# Patient Record
Sex: MALE | Race: BLACK OR AFRICAN AMERICAN | NOT HISPANIC OR LATINO | URBAN - METROPOLITAN AREA
[De-identification: names, ages, dates, MRNs, and addresses within clinical notes are randomized per-mention and may not be internally consistent; named-entity substitution may affect disease eponyms.]

---

## 2018-09-06 ENCOUNTER — INPATIENT (INPATIENT)
Facility: HOSPITAL | Age: 83
LOS: 4 days | Discharge: NURSING, REHAB OR LONG-TERM CARE | End: 2018-09-11
Attending: HOSPITALIST | Admitting: HOSPITALIST

## 2018-09-06 VITALS
RESPIRATION RATE: 198 BRPM | OXYGEN SATURATION: 100 % | HEIGHT: 69 IN | WEIGHT: 225.09 LBS | HEART RATE: 55 BPM | SYSTOLIC BLOOD PRESSURE: 122 MMHG | DIASTOLIC BLOOD PRESSURE: 69 MMHG | TEMPERATURE: 96 F

## 2018-09-06 DIAGNOSIS — Z98.890 OTHER SPECIFIED POSTPROCEDURAL STATES: Chronic | ICD-10-CM

## 2018-09-06 LAB
ALBUMIN SERPL ELPH-MCNC: 4.2 G/DL — SIGNIFICANT CHANGE UP (ref 3.5–5.2)
ALP SERPL-CCNC: 189 U/L — HIGH (ref 30–115)
ALT FLD-CCNC: 10 U/L — SIGNIFICANT CHANGE UP (ref 0–41)
ANION GAP SERPL CALC-SCNC: 17 MMOL/L — HIGH (ref 7–14)
APPEARANCE UR: CLEAR — SIGNIFICANT CHANGE UP
AST SERPL-CCNC: 42 U/L — HIGH (ref 0–41)
BACTERIA # UR AUTO: ABNORMAL
BASE EXCESS BLDV CALC-SCNC: 4 MMOL/L — HIGH (ref -2–2)
BASOPHILS # BLD AUTO: 0.02 K/UL — SIGNIFICANT CHANGE UP (ref 0–0.2)
BASOPHILS NFR BLD AUTO: 0.2 % — SIGNIFICANT CHANGE UP (ref 0–1)
BILIRUB SERPL-MCNC: 0.5 MG/DL — SIGNIFICANT CHANGE UP (ref 0.2–1.2)
BILIRUB UR-MCNC: NEGATIVE — SIGNIFICANT CHANGE UP
BUN SERPL-MCNC: 24 MG/DL — HIGH (ref 10–20)
CA-I SERPL-SCNC: 1.25 MMOL/L — SIGNIFICANT CHANGE UP (ref 1.12–1.3)
CALCIUM SERPL-MCNC: 9.4 MG/DL — SIGNIFICANT CHANGE UP (ref 8.5–10.1)
CHLORIDE SERPL-SCNC: 102 MMOL/L — SIGNIFICANT CHANGE UP (ref 98–110)
CO2 SERPL-SCNC: 29 MMOL/L — SIGNIFICANT CHANGE UP (ref 17–32)
COLOR SPEC: YELLOW — SIGNIFICANT CHANGE UP
COMMENT - URINE: SIGNIFICANT CHANGE UP
CREAT SERPL-MCNC: 1.9 MG/DL — HIGH (ref 0.7–1.5)
DIFF PNL FLD: NEGATIVE — SIGNIFICANT CHANGE UP
EOSINOPHIL # BLD AUTO: 0.07 K/UL — SIGNIFICANT CHANGE UP (ref 0–0.7)
EOSINOPHIL NFR BLD AUTO: 0.9 % — SIGNIFICANT CHANGE UP (ref 0–8)
EPI CELLS # UR: ABNORMAL /HPF
GAS PNL BLDV: 146 MMOL/L — HIGH (ref 136–145)
GAS PNL BLDV: SIGNIFICANT CHANGE UP
GLUCOSE SERPL-MCNC: 125 MG/DL — HIGH (ref 70–99)
GLUCOSE UR QL: NEGATIVE MG/DL — SIGNIFICANT CHANGE UP
GRAN CASTS # UR COMP ASSIST: ABNORMAL /LPF
HCO3 BLDV-SCNC: 31 MMOL/L — HIGH (ref 22–29)
HCT VFR BLD CALC: 33.7 % — LOW (ref 42–52)
HCT VFR BLDA CALC: 38.4 % — SIGNIFICANT CHANGE UP (ref 34–44)
HGB BLD CALC-MCNC: 12.5 G/DL — LOW (ref 14–18)
HGB BLD-MCNC: 11.1 G/DL — LOW (ref 14–18)
HOROWITZ INDEX BLDV+IHG-RTO: 21 — SIGNIFICANT CHANGE UP
HYALINE CASTS # UR AUTO: ABNORMAL /LPF
IMM GRANULOCYTES NFR BLD AUTO: 0.2 % — SIGNIFICANT CHANGE UP (ref 0.1–0.3)
KETONES UR-MCNC: NEGATIVE — SIGNIFICANT CHANGE UP
LACTATE BLDV-MCNC: 1.6 MMOL/L — SIGNIFICANT CHANGE UP (ref 0.5–1.6)
LACTATE SERPL-SCNC: 1.9 MMOL/L — SIGNIFICANT CHANGE UP (ref 0.5–2.2)
LEUKOCYTE ESTERASE UR-ACNC: NEGATIVE — SIGNIFICANT CHANGE UP
LYMPHOCYTES # BLD AUTO: 1.64 K/UL — SIGNIFICANT CHANGE UP (ref 1.2–3.4)
LYMPHOCYTES # BLD AUTO: 20.4 % — LOW (ref 20.5–51.1)
MAGNESIUM SERPL-MCNC: 2.6 MG/DL — HIGH (ref 1.8–2.4)
MCHC RBC-ENTMCNC: 30.4 PG — SIGNIFICANT CHANGE UP (ref 27–31)
MCHC RBC-ENTMCNC: 32.9 G/DL — SIGNIFICANT CHANGE UP (ref 32–37)
MCV RBC AUTO: 92.3 FL — SIGNIFICANT CHANGE UP (ref 80–94)
MONOCYTES # BLD AUTO: 0.76 K/UL — HIGH (ref 0.1–0.6)
MONOCYTES NFR BLD AUTO: 9.5 % — HIGH (ref 1.7–9.3)
NEUTROPHILS # BLD AUTO: 5.51 K/UL — SIGNIFICANT CHANGE UP (ref 1.4–6.5)
NEUTROPHILS NFR BLD AUTO: 68.8 % — SIGNIFICANT CHANGE UP (ref 42.2–75.2)
NITRITE UR-MCNC: NEGATIVE — SIGNIFICANT CHANGE UP
NRBC # BLD: 0 /100 WBCS — SIGNIFICANT CHANGE UP (ref 0–0)
PCO2 BLDV: 54 MMHG — HIGH (ref 41–51)
PH BLDV: 7.36 — SIGNIFICANT CHANGE UP (ref 7.26–7.43)
PH UR: 5.5 — SIGNIFICANT CHANGE UP (ref 5–8)
PLATELET # BLD AUTO: 261 K/UL — SIGNIFICANT CHANGE UP (ref 130–400)
PO2 BLDV: 27 MMHG — SIGNIFICANT CHANGE UP (ref 20–40)
POTASSIUM BLDV-SCNC: 3.7 MMOL/L — SIGNIFICANT CHANGE UP (ref 3.3–5.6)
POTASSIUM SERPL-MCNC: 6.4 MMOL/L — CRITICAL HIGH (ref 3.5–5)
POTASSIUM SERPL-SCNC: 6.4 MMOL/L — CRITICAL HIGH (ref 3.5–5)
PROT SERPL-MCNC: 7.8 G/DL — SIGNIFICANT CHANGE UP (ref 6–8)
PROT UR-MCNC: 100 MG/DL
RBC # BLD: 3.65 M/UL — LOW (ref 4.7–6.1)
RBC # FLD: 11.9 % — SIGNIFICANT CHANGE UP (ref 11.5–14.5)
RBC CASTS # UR COMP ASSIST: SIGNIFICANT CHANGE UP /HPF
SAO2 % BLDV: 42 % — SIGNIFICANT CHANGE UP
SODIUM SERPL-SCNC: 148 MMOL/L — HIGH (ref 135–146)
SP GR SPEC: 1.02 — SIGNIFICANT CHANGE UP (ref 1.01–1.03)
TROPONIN T SERPL-MCNC: <0.01 NG/ML — SIGNIFICANT CHANGE UP
UROBILINOGEN FLD QL: 0.2 MG/DL — SIGNIFICANT CHANGE UP (ref 0.2–0.2)
WBC # BLD: 8.02 K/UL — SIGNIFICANT CHANGE UP (ref 4.8–10.8)
WBC # FLD AUTO: 8.02 K/UL — SIGNIFICANT CHANGE UP (ref 4.8–10.8)
WBC UR QL: SIGNIFICANT CHANGE UP /HPF

## 2018-09-06 NOTE — ED ADULT TRIAGE NOTE - CHIEF COMPLAINT QUOTE
bambi from [facility] [place] for lethargy and excessive drooling.  fs [phone] in the field

## 2018-09-06 NOTE — ED ADULT NURSE NOTE - CHIEF COMPLAINT QUOTE
[place] [facility] [place].  fs [phone] in the field

## 2018-09-06 NOTE — ED ADULT NURSE NOTE - PMH
DM (diabetes mellitus)    HTN (hypertension) DM (diabetes mellitus)    HTN (hypertension)    Kidney disease Dementia    DM (diabetes mellitus)    HTN (hypertension)    Kidney disease

## 2018-09-06 NOTE — ED ADULT NURSE NOTE - NSIMPLEMENTINTERV_GEN_ALL_ED
Implemented All Fall with Harm Risk Interventions:  [place] to call system. Call bell, personal items and telephone within reach. Instruct patient to call for assistance. Room bathroom lighting operational. Non-slip footwear when patient is off stretcher. Physically safe environment: no spills, clutter or unnecessary equipment. Stretcher in lowest position, wheels locked, appropriate side rails in place. Provide visual cue, wrist band, yellow gown, etc. Monitor gait and stability. Monitor for mental status changes and reorient to person, place, and time. Review medications for side effects contributing to fall risk. Reinforce activity limits and safety measures with patient and family. Provide visual clues: red socks.

## 2018-09-06 NOTE — ED ADULT NURSE REASSESSMENT NOTE - NS ED NURSE REASSESS COMMENT FT1
IV infiltrated, PA Demarco aware, pt is with difficult IV access, PA used Ultrasound to insert IV line.

## 2018-09-07 DIAGNOSIS — R41.82 ALTERED MENTAL STATUS, UNSPECIFIED: ICD-10-CM

## 2018-09-07 DIAGNOSIS — I10 ESSENTIAL (PRIMARY) HYPERTENSION: ICD-10-CM

## 2018-09-07 DIAGNOSIS — F03.90 UNSPECIFIED DEMENTIA WITHOUT BEHAVIORAL DISTURBANCE: ICD-10-CM

## 2018-09-07 LAB
ANION GAP SERPL CALC-SCNC: 12 MMOL/L — SIGNIFICANT CHANGE UP (ref 7–14)
BUN SERPL-MCNC: 21 MG/DL — HIGH (ref 10–20)
CALCIUM SERPL-MCNC: 9.4 MG/DL — SIGNIFICANT CHANGE UP (ref 8.5–10.1)
CHLORIDE SERPL-SCNC: 105 MMOL/L — SIGNIFICANT CHANGE UP (ref 98–110)
CO2 SERPL-SCNC: 29 MMOL/L — SIGNIFICANT CHANGE UP (ref 17–32)
CREAT SERPL-MCNC: 1.9 MG/DL — HIGH (ref 0.7–1.5)
GLUCOSE BLDC GLUCOMTR-MCNC: 105 MG/DL — HIGH (ref 70–99)
GLUCOSE BLDC GLUCOMTR-MCNC: 141 MG/DL — HIGH (ref 70–99)
GLUCOSE BLDC GLUCOMTR-MCNC: 151 MG/DL — HIGH (ref 70–99)
GLUCOSE BLDC GLUCOMTR-MCNC: 98 MG/DL — SIGNIFICANT CHANGE UP (ref 70–99)
GLUCOSE SERPL-MCNC: 117 MG/DL — HIGH (ref 70–99)
HCT VFR BLD CALC: 34.6 % — LOW (ref 42–52)
HGB BLD-MCNC: 10.9 G/DL — LOW (ref 14–18)
MCHC RBC-ENTMCNC: 29 PG — SIGNIFICANT CHANGE UP (ref 27–31)
MCHC RBC-ENTMCNC: 31.5 G/DL — LOW (ref 32–37)
MCV RBC AUTO: 92 FL — SIGNIFICANT CHANGE UP (ref 80–94)
NRBC # BLD: 0 /100 WBCS — SIGNIFICANT CHANGE UP (ref 0–0)
PLATELET # BLD AUTO: 101 K/UL — LOW (ref 130–400)
POTASSIUM SERPL-MCNC: 4 MMOL/L — SIGNIFICANT CHANGE UP (ref 3.5–5)
POTASSIUM SERPL-SCNC: 4 MMOL/L — SIGNIFICANT CHANGE UP (ref 3.5–5)
RBC # BLD: 3.76 M/UL — LOW (ref 4.7–6.1)
RBC # FLD: 11.9 % — SIGNIFICANT CHANGE UP (ref 11.5–14.5)
SODIUM SERPL-SCNC: 146 MMOL/L — SIGNIFICANT CHANGE UP (ref 135–146)
WBC # BLD: 7.45 K/UL — SIGNIFICANT CHANGE UP (ref 4.8–10.8)
WBC # FLD AUTO: 7.45 K/UL — SIGNIFICANT CHANGE UP (ref 4.8–10.8)

## 2018-09-07 RX ORDER — FUROSEMIDE 40 MG
40 TABLET ORAL DAILY
Qty: 0 | Refills: 0 | Status: DISCONTINUED | OUTPATIENT
Start: 2018-09-07 | End: 2018-09-07

## 2018-09-07 RX ORDER — ASPIRIN/CALCIUM CARB/MAGNESIUM 324 MG
81 TABLET ORAL DAILY
Qty: 0 | Refills: 0 | Status: DISCONTINUED | OUTPATIENT
Start: 2018-09-07 | End: 2018-09-11

## 2018-09-07 RX ORDER — FERROUS SULFATE 325(65) MG
0 TABLET ORAL
Qty: 0 | Refills: 0 | COMMUNITY

## 2018-09-07 RX ORDER — TAMSULOSIN HYDROCHLORIDE 0.4 MG/1
0.4 CAPSULE ORAL AT BEDTIME
Qty: 0 | Refills: 0 | Status: DISCONTINUED | OUTPATIENT
Start: 2018-09-07 | End: 2018-09-11

## 2018-09-07 RX ORDER — SODIUM CHLORIDE 9 MG/ML
1000 INJECTION INTRAMUSCULAR; INTRAVENOUS; SUBCUTANEOUS
Qty: 0 | Refills: 0 | Status: DISCONTINUED | OUTPATIENT
Start: 2018-09-07 | End: 2018-09-07

## 2018-09-07 RX ORDER — INFLUENZA VIRUS VACCINE 15; 15; 15; 15 UG/.5ML; UG/.5ML; UG/.5ML; UG/.5ML
0.5 SUSPENSION INTRAMUSCULAR ONCE
Qty: 0 | Refills: 0 | Status: DISCONTINUED | OUTPATIENT
Start: 2018-09-07 | End: 2018-09-07

## 2018-09-07 RX ORDER — SODIUM CHLORIDE 9 MG/ML
1000 INJECTION INTRAMUSCULAR; INTRAVENOUS; SUBCUTANEOUS
Qty: 0 | Refills: 0 | Status: DISCONTINUED | OUTPATIENT
Start: 2018-09-07 | End: 2018-09-08

## 2018-09-07 RX ORDER — AMLODIPINE BESYLATE 2.5 MG/1
5 TABLET ORAL DAILY
Qty: 0 | Refills: 0 | Status: DISCONTINUED | OUTPATIENT
Start: 2018-09-07 | End: 2018-09-11

## 2018-09-07 RX ORDER — DONEPEZIL HYDROCHLORIDE 10 MG/1
1 TABLET, FILM COATED ORAL
Qty: 0 | Refills: 0 | COMMUNITY

## 2018-09-07 RX ORDER — MEMANTINE HYDROCHLORIDE 10 MG/1
10 TABLET ORAL
Qty: 0 | Refills: 0 | Status: DISCONTINUED | OUTPATIENT
Start: 2018-09-07 | End: 2018-09-11

## 2018-09-07 RX ORDER — LOSARTAN POTASSIUM 100 MG/1
50 TABLET, FILM COATED ORAL DAILY
Qty: 0 | Refills: 0 | Status: DISCONTINUED | OUTPATIENT
Start: 2018-09-07 | End: 2018-09-07

## 2018-09-07 RX ORDER — DONEPEZIL HYDROCHLORIDE 10 MG/1
10 TABLET, FILM COATED ORAL AT BEDTIME
Qty: 0 | Refills: 0 | Status: DISCONTINUED | OUTPATIENT
Start: 2018-09-07 | End: 2018-09-11

## 2018-09-07 RX ORDER — TAMSULOSIN HYDROCHLORIDE 0.4 MG/1
1 CAPSULE ORAL
Qty: 0 | Refills: 0 | COMMUNITY

## 2018-09-07 RX ORDER — MEMANTINE HYDROCHLORIDE 10 MG/1
1 TABLET ORAL
Qty: 0 | Refills: 0 | COMMUNITY

## 2018-09-07 RX ORDER — FERROUS SULFATE 325(65) MG
325 TABLET ORAL DAILY
Qty: 0 | Refills: 0 | Status: DISCONTINUED | OUTPATIENT
Start: 2018-09-07 | End: 2018-09-11

## 2018-09-07 RX ORDER — ASPIRIN/CALCIUM CARB/MAGNESIUM 324 MG
1 TABLET ORAL
Qty: 0 | Refills: 0 | COMMUNITY

## 2018-09-07 RX ADMIN — MEMANTINE HYDROCHLORIDE 10 MILLIGRAM(S): 10 TABLET ORAL at 18:00

## 2018-09-07 RX ADMIN — SODIUM CHLORIDE 75 MILLILITER(S): 9 INJECTION INTRAMUSCULAR; INTRAVENOUS; SUBCUTANEOUS at 17:25

## 2018-09-07 RX ADMIN — Medication 325 MILLIGRAM(S): at 11:08

## 2018-09-07 RX ADMIN — LOSARTAN POTASSIUM 50 MILLIGRAM(S): 100 TABLET, FILM COATED ORAL at 06:22

## 2018-09-07 RX ADMIN — Medication 81 MILLIGRAM(S): at 11:07

## 2018-09-07 RX ADMIN — Medication 40 MILLIGRAM(S): at 06:23

## 2018-09-07 RX ADMIN — MEMANTINE HYDROCHLORIDE 10 MILLIGRAM(S): 10 TABLET ORAL at 06:22

## 2018-09-07 RX ADMIN — DONEPEZIL HYDROCHLORIDE 10 MILLIGRAM(S): 10 TABLET, FILM COATED ORAL at 22:43

## 2018-09-07 NOTE — ED PROVIDER NOTE - OBJECTIVE STATEMENT
84 yo M with PMHx of dementia, DM, and HTN presents to the ED BIB family for evaluation of altered mental status. According to family pt is not as responsive as usual and has had increased drooling from his mouth. Usually pt is able to communicate well and walk to bathroom alone but since this morning they have been unable to communicate with him and he has not been having trouble walking. Family took pt to [facility] [place] who advised he go to ED for evaluation. Pt unable to provide hx 2/2 dementia/change in mental status?

## 2018-09-07 NOTE — ED PROVIDER NOTE - ATTENDING CONTRIBUTION TO CARE
84 yo M PMHx noted [place] via [place] [facility] [place]/o increased sleepiness, weakness over the last few days.  Granddaughter states that this has happened to him once in the past.  PMD advised to d/c one of his medications yesterday.  On exam pt in NAD sleepy, follows commands, OP clear, + drooling, Lungs CAT B/L no wrr, abd soft nt nd, decreased strength LE b/l, good

## 2018-09-07 NOTE — SWALLOW BEDSIDE ASSESSMENT ADULT - SWALLOW EVAL: RECOMMENDED FEEDING/EATING TECHNIQUES
position upright (90 degrees)/allow for swallow between intakes/maintain upright posture during/after eating for 30 mins/oral hygiene/alternate food with liquid

## 2018-09-07 NOTE — SWALLOW BEDSIDE ASSESSMENT ADULT - SWALLOW EVAL: DIAGNOSIS
cognitive skills impact PO intake per family patient is unable to tolerate chewables and tolerated puree at home.

## 2018-09-07 NOTE — ED PROVIDER NOTE - PHYSICAL EXAMINATION
VITAL SIGNS: I have reviewed nursing notes and confirm.  CONSTITUTIONAL: Elderly male laying on stretcher in NAD.   SKIN: Skin exam is warm and dry, no acute rash.  HEAD: Normocephalic; atraumatic.  EYES: Conjunctiva and sclera clear.  ENT: No nasal discharge; airway clear.   NECK: Supple; non tender.  CARD: S1, S2 normal; no murmurs, gallops, or rubs. Regular rate and rhythm.  RESP: No wheezes, rales or rhonchi. Speaking in full sentences.   ABD: Normal bowel sounds; soft; non-distended; non-tender; No rebound or guarding.   EXT: No clubbing, cyanosis or edema.  NEURO: Alert, oriented to self. Follows simple commands. Strength 5/5 B/L UE. Strength 4/5 B/L LE. Unable to walk. Sensation intact.

## 2018-09-07 NOTE — H&P ADULT - NSHPLABSRESULTS_GEN_ALL_CORE
11.1   8.02  )-----------( 261      ( 06 Sep 2018 22:40 )             33.7         146  |  105  |  21<H>  ----------------------------<  117<H>  4.0   |  29  |  1.9<H>    Ca    9.4      07 Sep 2018 00:45  Mg     2.6         TPro  7.8  /  Alb  4.2  /  TBili  0.5  /  DBili  x   /  AST  42<H>  /  ALT  10  /  AlkPhos  189<H>            Urinalysis Basic - ( 06 Sep 2018 22:39 )    Color: Yellow / Appearance: Clear / S.020 / pH: x  Gluc: x / Ketone: Negative  / Bili: Negative / Urobili: 0.2 mg/dL   Blood: x / Protein: 100 mg/dL / Nitrite: Negative   Leuk Esterase: Negative / RBC: 1-2 /HPF / WBC 1-2 /HPF   Sq Epi: x / Non Sq Epi: Few /HPF / Bacteria: Moderate        Lactate Trend   @ 18:03 Lactate:1.9     CARDIAC MARKERS ( 06 Sep 2018 18:03 )  x     / <0.01 ng/mL / x     / x     / x          CAPILLARY BLOOD GLUCOSE      POCT Blood Glucose.: 143 mg/dL (06 Sep 2018 17:50)

## 2018-09-07 NOTE — CONSULT NOTE ADULT - SUBJECTIVE AND OBJECTIVE BOX
Neurology Consultation note    Name   84 yo M with PMHx of dementia, DM, and HTN presents to the ED BIB family for evaluation of altered mental status. According to family pt is not as responsive as usual and has had increased drooling from his mouth. Usually pt is able to communicate well and walk to bathroom alone but since this morning they have been unable to communicate with him and he has not been having trouble walking.   NO LOC. PATIENT SEEMED TO BE STARING AT HIS WIFE BUT NOT VERBALIZING.    NOT RESPONSIVE AS USUAL. NO CONVULSIVE ACTIVITY.  THIS HAS BEEN GOING ON SINCE THE WEEKEND AS PER DAUGHTER  NO FEVER OR OTHER CONSTITUTIONAL SIGNS/SYMPTOMS    PATIENT HAD RECENT VISIT TO CARDIO AT [facility]. WAS TOLD HE HAD A COMPROMISED VALVE BUT FAMILY WAS NOT GIVEN AN Y MORE HX    SOCIAL HX: PATIENT WAS A FORMER BOXER  FAMILY HX: NON CONTRIBUTORY        Vital Signs Last 24 Hrs  T(C): 35.6 (07 Sep 2018 06:22), Max: 35.6 (07 Sep 2018 01:06)  T(F): 96 (07 Sep 2018 06:22), Max: 96 (07 Sep 2018 01:06)  HR: 52 (07 Sep 2018 10:16) (52 - 74)  BP: 121/66 (07 Sep 2018 10:16) (120/66 - 187/79)  BP(mean): --  RR: 16 (07 Sep 2018 06:22) (16 - 198)  SpO2: 100% (07 Sep 2018 01:06) (100% - 100%)    Neurological Exam:   LETHARGIC, ABLE TO OPEN EYES, FOLLOWS ALL SIMPLE COMMANDS  DECREASE SPEECH OUTPUT  MOVES ALL 4 EXTREMITIES EQUALLY  NON GROSS FOCALITY ON CR NN EXAM.  BUT PATIENT BASELINE BLIND OD. OD EXODEVIATED AT BASELINE  DEPRESSED DTRS X 4    Medications  amLODIPine   Tablet 5 milliGRAM(s) Oral daily  aspirin enteric coated 81 milliGRAM(s) Oral daily  donepezil 10 milliGRAM(s) Oral at bedtime  ferrous    sulfate 325 milliGRAM(s) Oral daily  memantine 10 milliGRAM(s) Oral two times a day  sodium chloride 0.9%. 1000 milliLiter(s) IV Continuous <Continuous>  tamsulosin 0.4 milliGRAM(s) Oral at bedtime      Lab  09-07    146  |  105  |  21<H>  ----------------------------<  117<H>  4.0   |  29  |  1.9<H>    Ca    9.4      07 Sep 2018 00:45  Mg     2.6     09-06    TPro  7.8  /  Alb  4.2  /  TBili  0.5  /  DBili  x   /  AST  42<H>  /  ALT  10  /  AlkPhos  189<H>  09-06                          10.9   7.45  )-----------( 101      ( 07 Sep 2018 08:42 )             34.6     LIVER FUNCTIONS - ( 06 Sep 2018 18:03 )  Alb: 4.2 g/dL / Pro: 7.8 g/dL / ALK PHOS: 189 U/L / ALT: 10 U/L / AST: 42 U/L / GGT: x             2.6        Radiology  CT Head No Cont:   EXAM:  CT BRAIN            PROCEDURE DATE:  09/06/2018            INTERPRETATION:  Clinical History / Reason for exam: Altered mental   status.    Technique: Multiple contiguous axial CT images of the head were obtained    from the base of the skull to the vertex without administration of   intravenous contrast. Coronal and sagittal reformats were obtained.    Comparison: None available.    Findings:     The ventricles, basal cisterns and sulcal pattern are prominent   consistent with parenchymal volume loss.    There are patchy and confluent areas of hypoattenuation in the   periventricular and subcortical white matter compatible with chronic   microvascular ischemic changes.    Grey-white differentiation is preserved.    There is no acutemass effect, midline shift or intracranial hemorrhage.      The calvarium is intact.    There is near complete opacification of the bilateral mastoid sinuses.   The paranasal sinuses are unremarkable.    Impression:     No CT evidence for acute intracranial pathology.    Moderate chronic microvascular ischemic changes.    Near complete opacification of the bilateral mastoid sinuses. Rule out   mastoiditis.        Assessment:  84 YO MAN WITH AFOREMENTIONED HX P/W AMS SINCE THE WEEKEND. WIFE REPORTS STARING SPELLS BUT NO LOC  PATIENT WITH LOWER SZ THRESHOLD GIVEN HX OF DEMENTIA  MAY ALSO BE PROGRESSION OF DEMENTIA BUT LESS LIKELY GIVEN ACUITY    Plan:  WILL START WITH REEG  NO AEDS FOR NOW  CONT W/U FOR MEDICAL CAUSES OF DELIRIUM/AMS  CHECK B12, FOLATE, THYROID PANEL AND AMMONIA   CONT ARICEPT AND NAMENDA  PLEASE CALL WITH ANY QUESTIONS

## 2018-09-07 NOTE — SWALLOW BEDSIDE ASSESSMENT ADULT - ASR SWALLOW ASPIRATION MONITOR
pneumonia/gurgly voice/upper respiratory infection/change of breathing pattern/cough/oral hygiene/position upright (90Y)/fever/throat clearing

## 2018-09-07 NOTE — H&P ADULT - ASSESSMENT
Patient is a 83y old  Male who presents with a chief complaint of                                                                                                                                                                                                                                                                                      HEALTH ISSUES - PROBLEM Dx:altered mental statushcvd/dementiadm

## 2018-09-07 NOTE — H&P ADULT - NSHPREVIEWOFSYSTEMS_GEN_ALL_CORE
REVIEW OF SYSTEMS:      CONSTITUTIONAL:     fever. significant wt loss  --n/a  EYES/ENT:       NECK:       RESPIRATORY:        copd n/a  CARDIOVASCULAR:        htn ++  GASTROINTESTINAL:     Dm   ++  GENITOURINARY:        NEUROLOGICAL:         Dementia ++  SKIN:

## 2018-09-08 DIAGNOSIS — N17.9 ACUTE KIDNEY FAILURE, UNSPECIFIED: ICD-10-CM

## 2018-09-08 DIAGNOSIS — E11.9 TYPE 2 DIABETES MELLITUS WITHOUT COMPLICATIONS: ICD-10-CM

## 2018-09-08 DIAGNOSIS — D69.6 THROMBOCYTOPENIA, UNSPECIFIED: ICD-10-CM

## 2018-09-08 LAB
ALBUMIN SERPL ELPH-MCNC: 3.3 G/DL — LOW (ref 3.5–5.2)
ALP SERPL-CCNC: 167 U/L — HIGH (ref 30–115)
ALT FLD-CCNC: 6 U/L — SIGNIFICANT CHANGE UP (ref 0–41)
AMMONIA BLD-MCNC: 19 UMOL/L — SIGNIFICANT CHANGE UP (ref 11–55)
ANION GAP SERPL CALC-SCNC: 9 MMOL/L — SIGNIFICANT CHANGE UP (ref 7–14)
AST SERPL-CCNC: 17 U/L — SIGNIFICANT CHANGE UP (ref 0–41)
BILIRUB SERPL-MCNC: 0.4 MG/DL — SIGNIFICANT CHANGE UP (ref 0.2–1.2)
BUN SERPL-MCNC: 15 MG/DL — SIGNIFICANT CHANGE UP (ref 10–20)
CALCIUM SERPL-MCNC: 8.9 MG/DL — SIGNIFICANT CHANGE UP (ref 8.5–10.1)
CHLORIDE SERPL-SCNC: 107 MMOL/L — SIGNIFICANT CHANGE UP (ref 98–110)
CO2 SERPL-SCNC: 29 MMOL/L — SIGNIFICANT CHANGE UP (ref 17–32)
CREAT SERPL-MCNC: 1.2 MG/DL — SIGNIFICANT CHANGE UP (ref 0.7–1.5)
CULTURE RESULTS: SIGNIFICANT CHANGE UP
GLUCOSE BLDC GLUCOMTR-MCNC: 101 MG/DL — HIGH (ref 70–99)
GLUCOSE BLDC GLUCOMTR-MCNC: 121 MG/DL — HIGH (ref 70–99)
GLUCOSE BLDC GLUCOMTR-MCNC: 128 MG/DL — HIGH (ref 70–99)
GLUCOSE BLDC GLUCOMTR-MCNC: 161 MG/DL — HIGH (ref 70–99)
GLUCOSE SERPL-MCNC: 138 MG/DL — HIGH (ref 70–99)
POTASSIUM SERPL-MCNC: 4.1 MMOL/L — SIGNIFICANT CHANGE UP (ref 3.5–5)
POTASSIUM SERPL-SCNC: 4.1 MMOL/L — SIGNIFICANT CHANGE UP (ref 3.5–5)
PROT SERPL-MCNC: 6.1 G/DL — SIGNIFICANT CHANGE UP (ref 6–8)
SODIUM SERPL-SCNC: 145 MMOL/L — SIGNIFICANT CHANGE UP (ref 135–146)
SPECIMEN SOURCE: SIGNIFICANT CHANGE UP

## 2018-09-08 RX ORDER — TEMAZEPAM 15 MG/1
15 CAPSULE ORAL AT BEDTIME
Qty: 0 | Refills: 0 | Status: DISCONTINUED | OUTPATIENT
Start: 2018-09-08 | End: 2018-09-11

## 2018-09-08 RX ORDER — TEMAZEPAM 15 MG/1
15 CAPSULE ORAL ONCE
Qty: 0 | Refills: 0 | Status: DISCONTINUED | OUTPATIENT
Start: 2018-09-08 | End: 2018-09-08

## 2018-09-08 RX ORDER — SODIUM CHLORIDE 9 MG/ML
1000 INJECTION INTRAMUSCULAR; INTRAVENOUS; SUBCUTANEOUS
Qty: 0 | Refills: 0 | Status: DISCONTINUED | OUTPATIENT
Start: 2018-09-08 | End: 2018-09-09

## 2018-09-08 RX ADMIN — DONEPEZIL HYDROCHLORIDE 10 MILLIGRAM(S): 10 TABLET, FILM COATED ORAL at 21:43

## 2018-09-08 RX ADMIN — MEMANTINE HYDROCHLORIDE 10 MILLIGRAM(S): 10 TABLET ORAL at 17:33

## 2018-09-08 RX ADMIN — Medication 81 MILLIGRAM(S): at 11:20

## 2018-09-08 RX ADMIN — SODIUM CHLORIDE 60 MILLILITER(S): 9 INJECTION INTRAMUSCULAR; INTRAVENOUS; SUBCUTANEOUS at 17:32

## 2018-09-08 RX ADMIN — TEMAZEPAM 15 MILLIGRAM(S): 15 CAPSULE ORAL at 23:07

## 2018-09-08 RX ADMIN — SODIUM CHLORIDE 75 MILLILITER(S): 9 INJECTION INTRAMUSCULAR; INTRAVENOUS; SUBCUTANEOUS at 09:58

## 2018-09-08 RX ADMIN — MEMANTINE HYDROCHLORIDE 10 MILLIGRAM(S): 10 TABLET ORAL at 05:32

## 2018-09-08 RX ADMIN — AMLODIPINE BESYLATE 5 MILLIGRAM(S): 2.5 TABLET ORAL at 05:32

## 2018-09-08 RX ADMIN — Medication 325 MILLIGRAM(S): at 11:20

## 2018-09-08 RX ADMIN — TAMSULOSIN HYDROCHLORIDE 0.4 MILLIGRAM(S): 0.4 CAPSULE ORAL at 21:43

## 2018-09-08 NOTE — PROGRESS NOTE ADULT - PROBLEM SELECTOR PLAN 1
CT head negative   No evidence of infectious encephalopathy  EEG normal   Suspect this due to worsening dementia   f/u b12, folate, syphilis screen, TSH

## 2018-09-08 NOTE — PROGRESS NOTE ADULT - ASSESSMENT
84 yo M with PMHx of dementia, DM, and HTN presents to the ED BIB family for evaluation of altered mental status. According to family pt is not as responsive as usual and has had increased drooling from his mouth. Usually pt is able to communicate well and walk to bathroom alone but since the morning of admission they have been unable to communicate with him and he has not been having trouble walking. [place] [facility] [place] go to ED for evaluation. Pt unable to provide hx 2/2 dementia.     Today, patient is alert and oriented x1. He is communicating in complete sentences.

## 2018-09-08 NOTE — PROGRESS NOTE ADULT - SUBJECTIVE AND OBJECTIVE BOX
Pt seen and examined at bedside. Calm, pleasant, AOx1. Denies any complaints.     VITAL SIGNS (Last 24 hrs):  T(C): 35.6 (09-08-18 @ 13:52), Max: 35.8 (09-07-18 @ 21:43)  HR: 55 (09-08-18 @ 13:52) (50 - 56)  BP: 137/63 (09-08-18 @ 13:52) (126/61 - 137/63)  RR: 16 (09-08-18 @ 13:52) (16 - 18)  SpO2: --  Wt(kg): --  Daily     Daily     I&O's Summary    07 Sep 2018 07:01  -  08 Sep 2018 07:00  --------------------------------------------------------  IN: 1000 mL / OUT: 0 mL / NET: 1000 mL    08 Sep 2018 07:01  -  08 Sep 2018 16:01  --------------------------------------------------------  IN: 1000 mL / OUT: 0 mL / NET: 1000 mL      PHYSICAL EXAM:  GENERAL: NAD, elderly   HEAD:  Atraumatic, Normocephalic  EYES: EOMI, PERRLA, conjunctiva and sclera clear  NECK: Supple, No JVD  CHEST/LUNG: Clear to auscultation bilaterally; No wheeze  HEART: Regular rate and rhythm; No murmurs, rubs, or gallops  ABDOMEN: Soft, Nontender, Nondistended; Bowel sounds present  EXTREMITIES:  2+ Peripheral Pulses, No clubbing, cyanosis, or edema  PSYCH: AAOx1  NEUROLOGY: uncooperative   SKIN: No rashes or lesions    Labs Reviewed  Spoke to patient in regards to abnormal labs.    CBC Full  -  ( 07 Sep 2018 08:42 )  WBC Count : 7.45 K/uL  Hemoglobin : 10.9 g/dL  Hematocrit : 34.6 %  Platelet Count - Automated : 101 K/uL  Mean Cell Volume : 92.0 fL  Mean Cell Hemoglobin : 29.0 pg  Mean Cell Hemoglobin Concentration : 31.5 g/dL  Auto Neutrophil # : x  Auto Lymphocyte # : x  Auto Monocyte # : x  Auto Eosinophil # : x  Auto Basophil # : x  Auto Neutrophil % : x  Auto Lymphocyte % : x  Auto Monocyte % : x  Auto Eosinophil % : x  Auto Basophil % : x    BMP:    09-08 @ 11:24    Blood Urea Nitrogen - 15  Calcium - 8.9  Carbond Dioxide - 29  Chloride - 107  Creatinine - 1.2  Glucose - 138  Potassium - 4.1  Sodium - 145      Hemoglobin A1c -     Urine Culture:  09-06 @ 22:42 Urine culture: --    Culture Results:   Normal Urogenital tianna present  Method Type: --  Organism: --  Organism Identification: --  Specimen Source: .Urine Clean Catch (Midstream)         MEDICATIONS  (STANDING):  amLODIPine   Tablet 5 milliGRAM(s) Oral daily  aspirin enteric coated 81 milliGRAM(s) Oral daily  donepezil 10 milliGRAM(s) Oral at bedtime  ferrous    sulfate 325 milliGRAM(s) Oral daily  memantine 10 milliGRAM(s) Oral two times a day  sodium chloride 0.9%. 1000 milliLiter(s) (75 mL/Hr) IV Continuous <Continuous>  tamsulosin 0.4 milliGRAM(s) Oral at bedtime    MEDICATIONS  (PRN):

## 2018-09-09 LAB
ANION GAP SERPL CALC-SCNC: 16 MMOL/L — HIGH (ref 7–14)
APPEARANCE UR: CLEAR — SIGNIFICANT CHANGE UP
BACTERIA # UR AUTO: ABNORMAL
BASOPHILS # BLD AUTO: 0.03 K/UL — SIGNIFICANT CHANGE UP (ref 0–0.2)
BASOPHILS NFR BLD AUTO: 0.4 % — SIGNIFICANT CHANGE UP (ref 0–1)
BILIRUB UR-MCNC: NEGATIVE — SIGNIFICANT CHANGE UP
BUN SERPL-MCNC: 10 MG/DL — SIGNIFICANT CHANGE UP (ref 10–20)
CALCIUM SERPL-MCNC: 9.7 MG/DL — SIGNIFICANT CHANGE UP (ref 8.5–10.1)
CHLORIDE SERPL-SCNC: 106 MMOL/L — SIGNIFICANT CHANGE UP (ref 98–110)
CO2 SERPL-SCNC: 25 MMOL/L — SIGNIFICANT CHANGE UP (ref 17–32)
COLOR SPEC: YELLOW — SIGNIFICANT CHANGE UP
CREAT SERPL-MCNC: 1.2 MG/DL — SIGNIFICANT CHANGE UP (ref 0.7–1.5)
DIFF PNL FLD: ABNORMAL
EOSINOPHIL # BLD AUTO: 0.14 K/UL — SIGNIFICANT CHANGE UP (ref 0–0.7)
EOSINOPHIL NFR BLD AUTO: 1.9 % — SIGNIFICANT CHANGE UP (ref 0–8)
FOLATE SERPL-MCNC: 17.6 NG/ML — SIGNIFICANT CHANGE UP
GLUCOSE BLDC GLUCOMTR-MCNC: 132 MG/DL — HIGH (ref 70–99)
GLUCOSE BLDC GLUCOMTR-MCNC: 147 MG/DL — HIGH (ref 70–99)
GLUCOSE BLDC GLUCOMTR-MCNC: 97 MG/DL — SIGNIFICANT CHANGE UP (ref 70–99)
GLUCOSE SERPL-MCNC: 103 MG/DL — HIGH (ref 70–99)
GLUCOSE UR QL: NEGATIVE MG/DL — SIGNIFICANT CHANGE UP
HCT VFR BLD CALC: 37.5 % — LOW (ref 42–52)
HGB BLD-MCNC: 11.7 G/DL — LOW (ref 14–18)
IMM GRANULOCYTES NFR BLD AUTO: 0.3 % — SIGNIFICANT CHANGE UP (ref 0.1–0.3)
KETONES UR-MCNC: NEGATIVE — SIGNIFICANT CHANGE UP
LEUKOCYTE ESTERASE UR-ACNC: NEGATIVE — SIGNIFICANT CHANGE UP
LYMPHOCYTES # BLD AUTO: 2.09 K/UL — SIGNIFICANT CHANGE UP (ref 1.2–3.4)
LYMPHOCYTES # BLD AUTO: 29 % — SIGNIFICANT CHANGE UP (ref 20.5–51.1)
MCHC RBC-ENTMCNC: 28.2 PG — SIGNIFICANT CHANGE UP (ref 27–31)
MCHC RBC-ENTMCNC: 31.2 G/DL — LOW (ref 32–37)
MCV RBC AUTO: 90.4 FL — SIGNIFICANT CHANGE UP (ref 80–94)
MONOCYTES # BLD AUTO: 0.51 K/UL — SIGNIFICANT CHANGE UP (ref 0.1–0.6)
MONOCYTES NFR BLD AUTO: 7.1 % — SIGNIFICANT CHANGE UP (ref 1.7–9.3)
NEUTROPHILS # BLD AUTO: 4.41 K/UL — SIGNIFICANT CHANGE UP (ref 1.4–6.5)
NEUTROPHILS NFR BLD AUTO: 61.3 % — SIGNIFICANT CHANGE UP (ref 42.2–75.2)
NITRITE UR-MCNC: NEGATIVE — SIGNIFICANT CHANGE UP
NRBC # BLD: 0 /100 WBCS — SIGNIFICANT CHANGE UP (ref 0–0)
PH UR: 7 — SIGNIFICANT CHANGE UP (ref 5–8)
PLATELET # BLD AUTO: 261 K/UL — SIGNIFICANT CHANGE UP (ref 130–400)
POTASSIUM SERPL-MCNC: 4.1 MMOL/L — SIGNIFICANT CHANGE UP (ref 3.5–5)
POTASSIUM SERPL-SCNC: 4.1 MMOL/L — SIGNIFICANT CHANGE UP (ref 3.5–5)
PROT UR-MCNC: 30 MG/DL
RBC # BLD: 4.15 M/UL — LOW (ref 4.7–6.1)
RBC # FLD: 11.6 % — SIGNIFICANT CHANGE UP (ref 11.5–14.5)
RBC CASTS # UR COMP ASSIST: SIGNIFICANT CHANGE UP /HPF
SODIUM SERPL-SCNC: 147 MMOL/L — HIGH (ref 135–146)
SP GR SPEC: 1.01 — SIGNIFICANT CHANGE UP (ref 1.01–1.03)
T PALLIDUM AB TITR SER: NEGATIVE — SIGNIFICANT CHANGE UP
TSH SERPL-MCNC: 1.21 UIU/ML — SIGNIFICANT CHANGE UP (ref 0.27–4.2)
UROBILINOGEN FLD QL: 1 MG/DL (ref 0.2–0.2)
VIT B12 SERPL-MCNC: 864 PG/ML — SIGNIFICANT CHANGE UP (ref 232–1245)
WBC # BLD: 7.2 K/UL — SIGNIFICANT CHANGE UP (ref 4.8–10.8)
WBC # FLD AUTO: 7.2 K/UL — SIGNIFICANT CHANGE UP (ref 4.8–10.8)
WBC UR QL: SIGNIFICANT CHANGE UP /HPF

## 2018-09-09 RX ORDER — SODIUM CHLORIDE 9 MG/ML
1000 INJECTION, SOLUTION INTRAVENOUS
Qty: 0 | Refills: 0 | Status: DISCONTINUED | OUTPATIENT
Start: 2018-09-09 | End: 2018-09-11

## 2018-09-09 RX ORDER — SODIUM CHLORIDE 9 MG/ML
1000 INJECTION INTRAMUSCULAR; INTRAVENOUS; SUBCUTANEOUS
Qty: 0 | Refills: 0 | Status: DISCONTINUED | OUTPATIENT
Start: 2018-09-09 | End: 2018-09-09

## 2018-09-09 RX ADMIN — SODIUM CHLORIDE 80 MILLILITER(S): 9 INJECTION, SOLUTION INTRAVENOUS at 14:46

## 2018-09-09 RX ADMIN — TAMSULOSIN HYDROCHLORIDE 0.4 MILLIGRAM(S): 0.4 CAPSULE ORAL at 21:23

## 2018-09-09 RX ADMIN — MEMANTINE HYDROCHLORIDE 10 MILLIGRAM(S): 10 TABLET ORAL at 17:06

## 2018-09-09 RX ADMIN — DONEPEZIL HYDROCHLORIDE 10 MILLIGRAM(S): 10 TABLET, FILM COATED ORAL at 21:23

## 2018-09-09 RX ADMIN — MEMANTINE HYDROCHLORIDE 10 MILLIGRAM(S): 10 TABLET ORAL at 05:15

## 2018-09-09 RX ADMIN — AMLODIPINE BESYLATE 5 MILLIGRAM(S): 2.5 TABLET ORAL at 05:15

## 2018-09-09 RX ADMIN — SODIUM CHLORIDE 60 MILLILITER(S): 9 INJECTION INTRAMUSCULAR; INTRAVENOUS; SUBCUTANEOUS at 05:15

## 2018-09-09 RX ADMIN — Medication 81 MILLIGRAM(S): at 14:18

## 2018-09-09 RX ADMIN — Medication 325 MILLIGRAM(S): at 14:18

## 2018-09-09 NOTE — PROGRESS NOTE ADULT - PROBLEM SELECTOR PLAN 1
CT head negative   No evidence of infectious encephalopathy  EEG normal   Suspect this due to NISHANT   b12, folate, syphilis screen, TSH negative

## 2018-09-09 NOTE — PROGRESS NOTE ADULT - SUBJECTIVE AND OBJECTIVE BOX
Pt seen and examined at bedside. Calm, pleasant, AOx1. Denies any complaints.     VITAL SIGNS (Last 24 hrs):  T(C): 36.6 (09-09-18 @ 05:16), Max: 36.6 (09-09-18 @ 05:16)  HR: 62 (09-09-18 @ 05:16) (51 - 62)  BP: 132/80 (09-09-18 @ 05:16) (123/75 - 132/80)  RR: 16 (09-09-18 @ 05:16) (16 - 16)  SpO2: --  Wt(kg): --  Daily     Daily     I&O's Summary    08 Sep 2018 07:01  -  09 Sep 2018 07:00  --------------------------------------------------------  IN: 1360 mL / OUT: 0 mL / NET: 1360 mL        PHYSICAL EXAM:  GENERAL: NAD, elderly   HEAD:  Atraumatic, Normocephalic  EYES: EOMI, PERRLA, conjunctiva and sclera clear  NECK: Supple, No JVD  CHEST/LUNG: Clear to auscultation bilaterally; No wheeze  HEART: Regular rate and rhythm; No murmurs, rubs, or gallops  ABDOMEN: Soft, Nontender, Nondistended; Bowel sounds present  EXTREMITIES:  2+ Peripheral Pulses, No clubbing, cyanosis, or edema  PSYCH: AAOx1  NEUROLOGY: uncooperative   SKIN: No rashes or lesions    Labs Reviewed       CBC Full  -  ( 09 Sep 2018 06:49 )  WBC Count : 7.20 K/uL  Hemoglobin : 11.7 g/dL  Hematocrit : 37.5 %  Platelet Count - Automated : 261 K/uL  Mean Cell Volume : 90.4 fL  Mean Cell Hemoglobin : 28.2 pg  Mean Cell Hemoglobin Concentration : 31.2 g/dL  Auto Neutrophil # : 4.41 K/uL  Auto Lymphocyte # : 2.09 K/uL  Auto Monocyte # : 0.51 K/uL  Auto Eosinophil # : 0.14 K/uL  Auto Basophil # : 0.03 K/uL  Auto Neutrophil % : 61.3 %  Auto Lymphocyte % : 29.0 %  Auto Monocyte % : 7.1 %  Auto Eosinophil % : 1.9 %  Auto Basophil % : 0.4 %    BMP:    09-09 @ 06:49    Blood Urea Nitrogen - 10  Calcium - 9.7  Carbond Dioxide - 25  Chloride - 106  Creatinine - 1.2  Glucose - 103  Potassium - 4.1  Sodium - 147      Hemoglobin A1c -     Urine Culture:  09-06 @ 22:42 Urine culture: --    Culture Results:   Normal Urogenital tianna present  Method Type: --  Organism: --  Organism Identification: --  Specimen Source: .Urine Clean Catch (Midstream)         MEDICATIONS  (STANDING):  amLODIPine   Tablet 5 milliGRAM(s) Oral daily  aspirin enteric coated 81 milliGRAM(s) Oral daily  dextrose 5% + sodium chloride 0.45%. 1000 milliLiter(s) (80 mL/Hr) IV Continuous <Continuous>  donepezil 10 milliGRAM(s) Oral at bedtime  ferrous    sulfate 325 milliGRAM(s) Oral daily  memantine 10 milliGRAM(s) Oral two times a day  tamsulosin 0.4 milliGRAM(s) Oral at bedtime    MEDICATIONS  (PRN):  temazepam 15 milliGRAM(s) Oral at bedtime PRN Insomnia

## 2018-09-09 NOTE — PROGRESS NOTE ADULT - ASSESSMENT
82 yo M with PMHx of dementia, DM, and HTN presents to the ED BIB family for evaluation of altered mental status. According to family pt is not as responsive as usual and has had increased drooling from his mouth. Usually pt is able to communicate well and walk to bathroom alone but since the morning of admission they have been unable to communicate with him and he has not been having trouble walking. Family took pt to [facility] [place] who advised he go to ED for evaluation. Pt unable to provide hx 2/2 dementia.

## 2018-09-10 LAB
ANION GAP SERPL CALC-SCNC: 11 MMOL/L — SIGNIFICANT CHANGE UP (ref 7–14)
BUN SERPL-MCNC: 9 MG/DL — LOW (ref 10–20)
CALCIUM SERPL-MCNC: 9.3 MG/DL — SIGNIFICANT CHANGE UP (ref 8.5–10.1)
CHLORIDE SERPL-SCNC: 108 MMOL/L — SIGNIFICANT CHANGE UP (ref 98–110)
CO2 SERPL-SCNC: 25 MMOL/L — SIGNIFICANT CHANGE UP (ref 17–32)
CREAT SERPL-MCNC: 1.2 MG/DL — SIGNIFICANT CHANGE UP (ref 0.7–1.5)
GLUCOSE BLDC GLUCOMTR-MCNC: 134 MG/DL — HIGH (ref 70–99)
GLUCOSE BLDC GLUCOMTR-MCNC: 134 MG/DL — HIGH (ref 70–99)
GLUCOSE BLDC GLUCOMTR-MCNC: 149 MG/DL — HIGH (ref 70–99)
GLUCOSE BLDC GLUCOMTR-MCNC: 168 MG/DL — HIGH (ref 70–99)
GLUCOSE SERPL-MCNC: 138 MG/DL — HIGH (ref 70–99)
POTASSIUM SERPL-MCNC: 3.9 MMOL/L — SIGNIFICANT CHANGE UP (ref 3.5–5)
POTASSIUM SERPL-SCNC: 3.9 MMOL/L — SIGNIFICANT CHANGE UP (ref 3.5–5)
SODIUM SERPL-SCNC: 144 MMOL/L — SIGNIFICANT CHANGE UP (ref 135–146)

## 2018-09-10 RX ADMIN — Medication 81 MILLIGRAM(S): at 12:08

## 2018-09-10 RX ADMIN — SODIUM CHLORIDE 80 MILLILITER(S): 9 INJECTION, SOLUTION INTRAVENOUS at 10:36

## 2018-09-10 RX ADMIN — TAMSULOSIN HYDROCHLORIDE 0.4 MILLIGRAM(S): 0.4 CAPSULE ORAL at 21:36

## 2018-09-10 RX ADMIN — DONEPEZIL HYDROCHLORIDE 10 MILLIGRAM(S): 10 TABLET, FILM COATED ORAL at 21:36

## 2018-09-10 RX ADMIN — MEMANTINE HYDROCHLORIDE 10 MILLIGRAM(S): 10 TABLET ORAL at 05:34

## 2018-09-10 RX ADMIN — Medication 325 MILLIGRAM(S): at 12:08

## 2018-09-10 RX ADMIN — MEMANTINE HYDROCHLORIDE 10 MILLIGRAM(S): 10 TABLET ORAL at 17:31

## 2018-09-10 RX ADMIN — AMLODIPINE BESYLATE 5 MILLIGRAM(S): 2.5 TABLET ORAL at 05:34

## 2018-09-10 NOTE — PROGRESS NOTE ADULT - ASSESSMENT
82 yo M with PMHx of dementia, DM, and HTN presents to the ED BIB family for evaluation of altered mental status. According to family pt is not as responsive as usual and has had increased drooling from his mouth. Usually pt is able to communicate well and walk to bathroom alone but since the morning of admission they have been unable to communicate with him and he has not been having trouble walking. [place] [facility] [place] go [place] ED for evaluation. Pt unable to provide hx 2/2 dementia.

## 2018-09-10 NOTE — PHYSICAL THERAPY INITIAL EVALUATION ADULT - TRANSFER SAFETY CONCERNS NOTED: SIT/STAND, REHAB EVAL
He is taking lisinopril 40 mg daily, metoprolol XL 25 mg daily and amlodipine 5 mg daily. He denies side effects from taking lisinopril, amlodipine and metoprolol. His blood pressure is stable with current regimens.   losing balance/decreased sequencing ability/decreased weight-shifting ability

## 2018-09-10 NOTE — PHYSICAL THERAPY INITIAL EVALUATION ADULT - GAIT DEVIATIONS NOTED, PT EVAL
decreased weight-shifting ability/decreased step length/decreased slade/stooped posture, dec heel strike/pushoff

## 2018-09-10 NOTE — PHYSICAL THERAPY INITIAL EVALUATION ADULT - GENERAL OBSERVATIONS, REHAB EVAL
10:03- 10:28 Chart reviewed. Pt encountered semireclined in bed, may be seen by Physical Therapist as confirmed with Nurse. Patient denied pain and would like to get up now; +IV RUE

## 2018-09-10 NOTE — PHYSICAL THERAPY INITIAL EVALUATION ADULT - IMPAIRMENTS CONTRIBUTING TO GAIT DEVIATIONS, PT EVAL
narrow base of support/decreased endurance/impaired postural control/impaired balance/decreased strength

## 2018-09-10 NOTE — PROGRESS NOTE ADULT - SUBJECTIVE AND OBJECTIVE BOX
Pt seen and examined at bedside. No events overnight. Pending placement.     VITAL SIGNS (Last 24 hrs):  T(C): 35.6 (09-10-18 @ 15:03), Max: 35.7 (09-09-18 @ 22:09)  HR: 57 (09-10-18 @ 15:03) (57 - 74)  BP: 131/57 (09-10-18 @ 15:03) (117/69 - 138/63)  RR: 16 (09-10-18 @ 15:03) (16 - 16)  SpO2: --  Wt(kg): --  Daily     Daily     I&O's Summary    09 Sep 2018 07:01  -  10 Sep 2018 07:00  --------------------------------------------------------  IN: 240 mL / OUT: 0 mL / NET: 240 mL    10 Sep 2018 07:01  -  10 Sep 2018 16:05  --------------------------------------------------------  IN: 1000 mL / OUT: 0 mL / NET: 1000 mL        PHYSICAL EXAM:  GENERAL: NAD, well-developed  HEAD:  Atraumatic, Normocephalic  EYES: EOMI, PERRLA, conjunctiva and sclera clear  NECK: Supple, No JVD  CHEST/LUNG: Clear to auscultation bilaterally; No wheeze  HEART: Regular rate and rhythm; No murmurs, rubs, or gallops  ABDOMEN: Soft, Nontender, Nondistended; Bowel sounds present  EXTREMITIES:  2+ Peripheral Pulses, No clubbing, cyanosis, or edema  PSYCH: AAOx1  NEUROLOGY: non-focal  SKIN: No rashes or lesions    Labs Reviewed  Spoke to patient in regards to abnormal labs.    CBC Full  -  ( 09 Sep 2018 06:49 )  WBC Count : 7.20 K/uL  Hemoglobin : 11.7 g/dL  Hematocrit : 37.5 %  Platelet Count - Automated : 261 K/uL  Mean Cell Volume : 90.4 fL  Mean Cell Hemoglobin : 28.2 pg  Mean Cell Hemoglobin Concentration : 31.2 g/dL  Auto Neutrophil # : 4.41 K/uL  Auto Lymphocyte # : 2.09 K/uL  Auto Monocyte # : 0.51 K/uL  Auto Eosinophil # : 0.14 K/uL  Auto Basophil # : 0.03 K/uL  Auto Neutrophil % : 61.3 %  Auto Lymphocyte % : 29.0 %  Auto Monocyte % : 7.1 %  Auto Eosinophil % : 1.9 %  Auto Basophil % : 0.4 %    BMP:    09-10 @ 07:55    Blood Urea Nitrogen - 9  Calcium - 9.3  Carbond Dioxide - 25  Chloride - 108  Creatinine - 1.2  Glucose - 138  Potassium - 3.9  Sodium - 144      Hemoglobin A1c -     Urine Culture:  09-06 @ 22:42 Urine culture: --    Culture Results:   Normal Urogenital tianan present  Method Type: --  Organism: --  Organism Identification: --  Specimen Source: .Urine Clean Catch (Midstream)      MEDICATIONS  (STANDING):  amLODIPine   Tablet 5 milliGRAM(s) Oral daily  aspirin enteric coated 81 milliGRAM(s) Oral daily  dextrose 5% + sodium chloride 0.45%. 1000 milliLiter(s) (80 mL/Hr) IV Continuous <Continuous>  donepezil 10 milliGRAM(s) Oral at bedtime  ferrous    sulfate 325 milliGRAM(s) Oral daily  memantine 10 milliGRAM(s) Oral two times a day  tamsulosin 0.4 milliGRAM(s) Oral at bedtime    MEDICATIONS  (PRN):  temazepam 15 milliGRAM(s) Oral at bedtime PRN Insomnia

## 2018-09-10 NOTE — PHYSICAL THERAPY INITIAL EVALUATION ADULT - IMPAIRED TRANSFERS: SIT/STAND, REHAB EVAL
narrow base of support/impaired postural control/decreased strength/impaired balance/decreased endurance

## 2018-09-10 NOTE — PROGRESS NOTE ADULT - REASON FOR ADMISSION
unresponsiveness, unable to ambulate since past 24 hrs
un responsiveness,unable to ambulate   since past 24 hrs
un responsiveness,unable to ambulate   since past 24 hrs

## 2018-09-11 ENCOUNTER — TRANSCRIPTION ENCOUNTER (OUTPATIENT)
Age: 83
End: 2018-09-11

## 2018-09-11 VITALS
TEMPERATURE: 98 F | HEART RATE: 93 BPM | SYSTOLIC BLOOD PRESSURE: 125 MMHG | DIASTOLIC BLOOD PRESSURE: 56 MMHG | RESPIRATION RATE: 16 BRPM

## 2018-09-11 LAB
GLUCOSE BLDC GLUCOMTR-MCNC: 140 MG/DL — HIGH (ref 70–99)
GLUCOSE BLDC GLUCOMTR-MCNC: 141 MG/DL — HIGH (ref 70–99)
GLUCOSE BLDC GLUCOMTR-MCNC: 144 MG/DL — HIGH (ref 70–99)

## 2018-09-11 RX ORDER — LOSARTAN POTASSIUM 100 MG/1
1 TABLET, FILM COATED ORAL
Qty: 0 | Refills: 0 | COMMUNITY

## 2018-09-11 RX ORDER — GLIMEPIRIDE 1 MG
1 TABLET ORAL
Qty: 0 | Refills: 0 | COMMUNITY

## 2018-09-11 RX ORDER — AMLODIPINE BESYLATE 2.5 MG/1
1 TABLET ORAL
Qty: 0 | Refills: 0 | COMMUNITY
Start: 2018-09-11

## 2018-09-11 RX ORDER — FUROSEMIDE 40 MG
1 TABLET ORAL
Qty: 0 | Refills: 0 | COMMUNITY

## 2018-09-11 RX ADMIN — MEMANTINE HYDROCHLORIDE 10 MILLIGRAM(S): 10 TABLET ORAL at 05:17

## 2018-09-11 RX ADMIN — SODIUM CHLORIDE 80 MILLILITER(S): 9 INJECTION, SOLUTION INTRAVENOUS at 05:17

## 2018-09-11 RX ADMIN — Medication 325 MILLIGRAM(S): at 11:15

## 2018-09-11 RX ADMIN — Medication 81 MILLIGRAM(S): at 11:15

## 2018-09-11 RX ADMIN — AMLODIPINE BESYLATE 5 MILLIGRAM(S): 2.5 TABLET ORAL at 05:17

## 2018-09-11 NOTE — DISCHARGE NOTE ADULT - PATIENT PORTAL LINK FT
You can access the [facility] Patient Portal, offered by [facility], by registering with the following website: [email or web address]

## 2018-09-11 NOTE — DISCHARGE NOTE ADULT - SECONDARY DIAGNOSIS.
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

## 2018-09-11 NOTE — DISCHARGE NOTE ADULT - MEDICATION SUMMARY - MEDICATIONS TO STOP TAKING
I will STOP taking the medications listed below when I get home from the hospital:    glimepiride 1 mg oral tablet  -- 1 tab(s) by mouth once a day    Lasix 40 mg oral tablet  -- 1 tab(s) by mouth once a day    losartan 50 mg oral tablet  -- 1 tab(s) by mouth once a day    Augmentin 875 mg-125 mg oral tablet  -- 1 tab(s) by mouth every 12 hours

## 2018-09-11 NOTE — DISCHARGE NOTE ADULT - PLAN OF CARE
adequate hydration hold lasix and losartan  continue amlodipine  assist with feeding to prevent dehydration discontinue glipizide   can use metformin if needed monitor feeds, assist with feeding

## 2018-09-11 NOTE — DISCHARGE NOTE ADULT - CARE PLAN
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

## 2018-09-11 NOTE — DISCHARGE NOTE ADULT - MEDICATION SUMMARY - MEDICATIONS TO TAKE
I will START or STAY ON the medications listed below when I get home from the hospital:    Aspir 81 oral delayed release tablet  -- 1 tab(s) by mouth once a day  -- Indication: For cva prophylaxis     Flomax 0.4 mg oral capsule  -- 1 cap(s) by mouth once a day  -- Indication: For BPH    pravastatin 40 mg oral tablet  -- 1 tab(s) by mouth once a day  -- Indication: For HLD    amLODIPine 5 mg oral tablet  -- 1 tab(s) by mouth once a day  -- Indication: For HTN (hypertension)    donepezil 10 mg oral tablet  -- 1 tab(s) by mouth once a day (at bedtime)  -- Indication: For Dementia    ferrous sulfate 324 mg (65 mg elemental iron) oral tablet  -- orally once a day  -- Indication: For Anemia    memantine 10 mg oral tablet  -- 1 tab(s) by mouth 2 times a day  -- Indication: For Dementia

## 2018-09-11 NOTE — DISCHARGE NOTE ADULT - HOSPITAL COURSE
Elderly patient with hx of dementia and hypertension presented with hypoactive delirium. Patient had acute kidney injury likely due to dehydration and medications. Patient was on furosemide and losartan at home for hypertension which could have contributed to the kidney dysfunction. He has advanced dementia with dysphagia and requires monitoring and assistance with feeding. He also requires a pureed diet with thin liquids. To prevent recurrence of dehydration it is of paramount importance to feed him with assistance. Please monitor closely for aspiration.

## 2018-09-14 ENCOUNTER — INPATIENT (INPATIENT)
Facility: HOSPITAL | Age: 83
LOS: 6 days | Discharge: NURSING, REHAB OR LONG-TERM CARE | End: 2018-09-21
Attending: INTERNAL MEDICINE | Admitting: INTERNAL MEDICINE
Payer: MEDICARE

## 2018-09-14 VITALS
HEART RATE: 106 BPM | RESPIRATION RATE: 18 BRPM | TEMPERATURE: 101 F | OXYGEN SATURATION: 93 % | SYSTOLIC BLOOD PRESSURE: 102 MMHG | DIASTOLIC BLOOD PRESSURE: 53 MMHG | WEIGHT: 210.1 LBS

## 2018-09-14 DIAGNOSIS — Z98.890 OTHER SPECIFIED POSTPROCEDURAL STATES: Chronic | ICD-10-CM

## 2018-09-14 LAB
BASOPHILS # BLD AUTO: 0.02 K/UL — SIGNIFICANT CHANGE UP (ref 0–0.2)
BASOPHILS NFR BLD AUTO: 0.2 % — SIGNIFICANT CHANGE UP (ref 0–1)
EOSINOPHIL # BLD AUTO: 0.01 K/UL — SIGNIFICANT CHANGE UP (ref 0–0.7)
EOSINOPHIL NFR BLD AUTO: 0.1 % — SIGNIFICANT CHANGE UP (ref 0–8)
HCT VFR BLD CALC: 31.6 % — LOW (ref 42–52)
HGB BLD-MCNC: 10.5 G/DL — LOW (ref 14–18)
IMM GRANULOCYTES NFR BLD AUTO: 0.3 % — SIGNIFICANT CHANGE UP (ref 0.1–0.3)
LYMPHOCYTES # BLD AUTO: 0.72 K/UL — LOW (ref 1.2–3.4)
LYMPHOCYTES # BLD AUTO: 6.3 % — LOW (ref 20.5–51.1)
MCHC RBC-ENTMCNC: 28.6 PG — SIGNIFICANT CHANGE UP (ref 27–31)
MCHC RBC-ENTMCNC: 33.2 G/DL — SIGNIFICANT CHANGE UP (ref 32–37)
MCV RBC AUTO: 86.1 FL — SIGNIFICANT CHANGE UP (ref 80–94)
MONOCYTES # BLD AUTO: 0.93 K/UL — HIGH (ref 0.1–0.6)
MONOCYTES NFR BLD AUTO: 8.2 % — SIGNIFICANT CHANGE UP (ref 1.7–9.3)
NEUTROPHILS # BLD AUTO: 9.68 K/UL — HIGH (ref 1.4–6.5)
NEUTROPHILS NFR BLD AUTO: 84.9 % — HIGH (ref 42.2–75.2)
NRBC # BLD: 0 /100 WBCS — SIGNIFICANT CHANGE UP (ref 0–0)
PLATELET # BLD AUTO: 210 K/UL — SIGNIFICANT CHANGE UP (ref 130–400)
RBC # BLD: 3.67 M/UL — LOW (ref 4.7–6.1)
RBC # FLD: 11.9 % — SIGNIFICANT CHANGE UP (ref 11.5–14.5)
WBC # BLD: 11.39 K/UL — HIGH (ref 4.8–10.8)
WBC # FLD AUTO: 11.39 K/UL — HIGH (ref 4.8–10.8)

## 2018-09-14 RX ORDER — SODIUM CHLORIDE 9 MG/ML
2900 INJECTION, SOLUTION INTRAVENOUS ONCE
Qty: 0 | Refills: 0 | Status: COMPLETED | OUTPATIENT
Start: 2018-09-14 | End: 2018-09-14

## 2018-09-14 RX ADMIN — SODIUM CHLORIDE 2900 MILLILITER(S): 9 INJECTION, SOLUTION INTRAVENOUS at 23:34

## 2018-09-14 NOTE — ED PROVIDER NOTE - PHYSICAL EXAMINATION
CONSTITUTIONAL: Well-developed; well-nourished; in no acute distress.   SKIN: warm, dry  HEAD: Normocephalic; atraumatic.  CARD: S1, S2 normal; no murmurs, gallops, or rubs. Regular rate and rhythm.   RESP: No wheezes, rales or rhonchi.  ABD: Mild abdominal distention, no masses  EXT: Normal ROM.  No clubbing, cyanosis or edema.   NEURO: Dementia, able to vocate needs,  PSYCH: Cooperative, appropriate.

## 2018-09-14 NOTE — ED PROVIDER NOTE - NS ED ROS FT
General: + fever, +chills  Eyes:  No visual changes, eye pain or discharge.  ENMT:  No hearing changes, pain, no sore throat or runny nose, no difficulty swallowing  Cardiac:  No chest pain, SOB or edema. No chest pain with exertion.  Respiratory:  No cough or respiratory distress. No hemoptysis. No history of asthma or RAD.  GI:  No nausea, vomiting, diarrhea or abdominal pain.  : +urinary retention, +dysuria  MS:  No myalgia, muscle weakness, joint pain or back pain.  Neuro:  No headache or weakness.  No LOC.  Skin:  No skin rash.   Endocrine: No history of thyroid disease or diabetes.

## 2018-09-14 NOTE — ED ADULT TRIAGE NOTE - CHIEF COMPLAINT QUOTE
anuria hx of uti pt BIBA from [facility] w/ symptoms of anuria. pt has hx of uti, sepsis criteria met. as per EMS pt disoriented at baseline

## 2018-09-14 NOTE — ED PROVIDER NOTE - MEDICAL DECISION MAKING DETAILS
83 male here for urinary retention found to be in renal failure will get labs IV, IVF, ABX and admission after de leon placement for bladder decompression in ED.

## 2018-09-14 NOTE — ED PROVIDER NOTE - OBJECTIVE STATEMENT
83 y.o. M with PMH of BPH, CAD, HTN, Dementia, HLP BIBEMS from [facility] for urinary symptoms. He stated he woke up this morning with pain on urination. Family states that he had trouble urinating for a few months. Today he is unable to urinate, Nursing home sent him in for fever.

## 2018-09-14 NOTE — ED PROVIDER NOTE - ATTENDING CONTRIBUTION TO CARE
I personally evaluated the patient. I reviewed the Resident’s or Physician Assistant’s note (as assigned above), and agree with the findings and plan except as documented in my note.     83 male here for evaluation of urinary issues. Limited historian due to existing cognitive decline. Daughter here to assist HPI. States he urinated yesterday.     PE: male in no distress. ABD: soft, suprapubic tenderness to palpation no rebound. SKIN: warm dry. CV: pulses intact. CHEST: CTA bilateral. NEURO: cognitively impaired no focal deficits    Impression: renal failure    Plan: IV labs imaging supportive care IVF ABX reevaluation and admission.

## 2018-09-15 LAB
ALBUMIN SERPL ELPH-MCNC: 3.4 G/DL — LOW (ref 3.5–5.2)
ALP SERPL-CCNC: 171 U/L — HIGH (ref 30–115)
ALT FLD-CCNC: 11 U/L — SIGNIFICANT CHANGE UP (ref 0–41)
ANION GAP SERPL CALC-SCNC: 18 MMOL/L — HIGH (ref 7–14)
ANION GAP SERPL CALC-SCNC: 24 MMOL/L — HIGH (ref 7–14)
APPEARANCE UR: ABNORMAL
APTT BLD: 34.2 SEC — SIGNIFICANT CHANGE UP (ref 27–39.2)
AST SERPL-CCNC: 25 U/L — SIGNIFICANT CHANGE UP (ref 0–41)
BACTERIA # UR AUTO: SIGNIFICANT CHANGE UP /HPF
BASOPHILS # BLD AUTO: 0.01 K/UL — SIGNIFICANT CHANGE UP (ref 0–0.2)
BASOPHILS NFR BLD AUTO: 0.1 % — SIGNIFICANT CHANGE UP (ref 0–1)
BILIRUB SERPL-MCNC: 0.3 MG/DL — SIGNIFICANT CHANGE UP (ref 0.2–1.2)
BILIRUB UR-MCNC: NEGATIVE — SIGNIFICANT CHANGE UP
BUN SERPL-MCNC: 46 MG/DL — HIGH (ref 10–20)
BUN SERPL-MCNC: 46 MG/DL — HIGH (ref 10–20)
CALCIUM SERPL-MCNC: 8.4 MG/DL — LOW (ref 8.5–10.1)
CALCIUM SERPL-MCNC: 8.4 MG/DL — LOW (ref 8.5–10.1)
CHLORIDE SERPL-SCNC: 95 MMOL/L — LOW (ref 98–110)
CHLORIDE SERPL-SCNC: 99 MMOL/L — SIGNIFICANT CHANGE UP (ref 98–110)
CO2 SERPL-SCNC: 15 MMOL/L — LOW (ref 17–32)
CO2 SERPL-SCNC: 19 MMOL/L — SIGNIFICANT CHANGE UP (ref 17–32)
COLOR SPEC: SIGNIFICANT CHANGE UP
CREAT SERPL-MCNC: 5.3 MG/DL — CRITICAL HIGH (ref 0.7–1.5)
CREAT SERPL-MCNC: 6.5 MG/DL — CRITICAL HIGH (ref 0.7–1.5)
DIFF PNL FLD: ABNORMAL
EOSINOPHIL # BLD AUTO: 0.02 K/UL — SIGNIFICANT CHANGE UP (ref 0–0.7)
EOSINOPHIL NFR BLD AUTO: 0.2 % — SIGNIFICANT CHANGE UP (ref 0–8)
EPI CELLS # UR: ABNORMAL /HPF
GLUCOSE SERPL-MCNC: 163 MG/DL — HIGH (ref 70–99)
GLUCOSE SERPL-MCNC: 252 MG/DL — HIGH (ref 70–99)
GLUCOSE UR QL: NEGATIVE MG/DL — SIGNIFICANT CHANGE UP
HCT VFR BLD CALC: 28.4 % — LOW (ref 42–52)
HGB BLD-MCNC: 9.3 G/DL — LOW (ref 14–18)
IMM GRANULOCYTES NFR BLD AUTO: 0.3 % — SIGNIFICANT CHANGE UP (ref 0.1–0.3)
INR BLD: 1.19 RATIO — SIGNIFICANT CHANGE UP (ref 0.65–1.3)
KETONES UR-MCNC: NEGATIVE — SIGNIFICANT CHANGE UP
LACTATE SERPL-SCNC: 1.8 MMOL/L — SIGNIFICANT CHANGE UP (ref 0.5–2.2)
LEUKOCYTE ESTERASE UR-ACNC: NEGATIVE — SIGNIFICANT CHANGE UP
LYMPHOCYTES # BLD AUTO: 1.38 K/UL — SIGNIFICANT CHANGE UP (ref 1.2–3.4)
LYMPHOCYTES # BLD AUTO: 11.4 % — LOW (ref 20.5–51.1)
MCHC RBC-ENTMCNC: 28.3 PG — SIGNIFICANT CHANGE UP (ref 27–31)
MCHC RBC-ENTMCNC: 32.7 G/DL — SIGNIFICANT CHANGE UP (ref 32–37)
MCV RBC AUTO: 86.3 FL — SIGNIFICANT CHANGE UP (ref 80–94)
MONOCYTES # BLD AUTO: 1.16 K/UL — HIGH (ref 0.1–0.6)
MONOCYTES NFR BLD AUTO: 9.6 % — HIGH (ref 1.7–9.3)
NEUTROPHILS # BLD AUTO: 9.53 K/UL — HIGH (ref 1.4–6.5)
NEUTROPHILS NFR BLD AUTO: 78.4 % — HIGH (ref 42.2–75.2)
NITRITE UR-MCNC: NEGATIVE — SIGNIFICANT CHANGE UP
NRBC # BLD: 0 /100 WBCS — SIGNIFICANT CHANGE UP (ref 0–0)
PH UR: 5.5 — SIGNIFICANT CHANGE UP (ref 5–8)
PLATELET # BLD AUTO: 226 K/UL — SIGNIFICANT CHANGE UP (ref 130–400)
POTASSIUM SERPL-MCNC: 4.8 MMOL/L — SIGNIFICANT CHANGE UP (ref 3.5–5)
POTASSIUM SERPL-MCNC: 5.4 MMOL/L — HIGH (ref 3.5–5)
POTASSIUM SERPL-SCNC: 4.8 MMOL/L — SIGNIFICANT CHANGE UP (ref 3.5–5)
POTASSIUM SERPL-SCNC: 5.4 MMOL/L — HIGH (ref 3.5–5)
PROT SERPL-MCNC: 7.2 G/DL — SIGNIFICANT CHANGE UP (ref 6–8)
PROT UR-MCNC: ABNORMAL MG/DL
PROTHROM AB SERPL-ACNC: 12.8 SEC — SIGNIFICANT CHANGE UP (ref 9.95–12.87)
RBC # BLD: 3.29 M/UL — LOW (ref 4.7–6.1)
RBC # FLD: 11.9 % — SIGNIFICANT CHANGE UP (ref 11.5–14.5)
RBC CASTS # UR COMP ASSIST: >50 /HPF
SODIUM SERPL-SCNC: 134 MMOL/L — LOW (ref 135–146)
SODIUM SERPL-SCNC: 136 MMOL/L — SIGNIFICANT CHANGE UP (ref 135–146)
SP GR SPEC: 1.01 — SIGNIFICANT CHANGE UP (ref 1.01–1.03)
UROBILINOGEN FLD QL: 0.2 MG/DL — SIGNIFICANT CHANGE UP (ref 0.2–0.2)
WBC # BLD: 12.14 K/UL — HIGH (ref 4.8–10.8)
WBC # FLD AUTO: 12.14 K/UL — HIGH (ref 4.8–10.8)

## 2018-09-15 PROCEDURE — 99222 1ST HOSP IP/OBS MODERATE 55: CPT

## 2018-09-15 RX ORDER — VANCOMYCIN HCL 1 G
750 VIAL (EA) INTRAVENOUS EVERY 24 HOURS
Qty: 0 | Refills: 0 | Status: DISCONTINUED | OUTPATIENT
Start: 2018-09-15 | End: 2018-09-15

## 2018-09-15 RX ORDER — INFLUENZA VIRUS VACCINE 15; 15; 15; 15 UG/.5ML; UG/.5ML; UG/.5ML; UG/.5ML
0.5 SUSPENSION INTRAMUSCULAR ONCE
Qty: 0 | Refills: 0 | Status: COMPLETED | OUTPATIENT
Start: 2018-09-15 | End: 2018-09-18

## 2018-09-15 RX ORDER — TAMSULOSIN HYDROCHLORIDE 0.4 MG/1
0.4 CAPSULE ORAL AT BEDTIME
Qty: 0 | Refills: 0 | Status: DISCONTINUED | OUTPATIENT
Start: 2018-09-15 | End: 2018-09-21

## 2018-09-15 RX ORDER — MEMANTINE HYDROCHLORIDE 10 MG/1
10 TABLET ORAL
Qty: 0 | Refills: 0 | Status: DISCONTINUED | OUTPATIENT
Start: 2018-09-15 | End: 2018-09-21

## 2018-09-15 RX ORDER — SODIUM CHLORIDE 9 MG/ML
1000 INJECTION INTRAMUSCULAR; INTRAVENOUS; SUBCUTANEOUS
Qty: 0 | Refills: 0 | Status: DISCONTINUED | OUTPATIENT
Start: 2018-09-15 | End: 2018-09-17

## 2018-09-15 RX ORDER — MEROPENEM 1 G/30ML
500 INJECTION INTRAVENOUS EVERY 24 HOURS
Qty: 0 | Refills: 0 | Status: DISCONTINUED | OUTPATIENT
Start: 2018-09-15 | End: 2018-09-18

## 2018-09-15 RX ORDER — FERROUS SULFATE 325(65) MG
325 TABLET ORAL DAILY
Qty: 0 | Refills: 0 | Status: DISCONTINUED | OUTPATIENT
Start: 2018-09-15 | End: 2018-09-18

## 2018-09-15 RX ORDER — ASPIRIN/CALCIUM CARB/MAGNESIUM 324 MG
81 TABLET ORAL DAILY
Qty: 0 | Refills: 0 | Status: DISCONTINUED | OUTPATIENT
Start: 2018-09-15 | End: 2018-09-21

## 2018-09-15 RX ORDER — ACETAMINOPHEN 500 MG
650 TABLET ORAL EVERY 6 HOURS
Qty: 0 | Refills: 0 | Status: DISCONTINUED | OUTPATIENT
Start: 2018-09-15 | End: 2018-09-21

## 2018-09-15 RX ORDER — DONEPEZIL HYDROCHLORIDE 10 MG/1
10 TABLET, FILM COATED ORAL AT BEDTIME
Qty: 0 | Refills: 0 | Status: DISCONTINUED | OUTPATIENT
Start: 2018-09-15 | End: 2018-09-21

## 2018-09-15 RX ORDER — HEPARIN SODIUM 5000 [USP'U]/ML
5000 INJECTION INTRAVENOUS; SUBCUTANEOUS EVERY 8 HOURS
Qty: 0 | Refills: 0 | Status: DISCONTINUED | OUTPATIENT
Start: 2018-09-15 | End: 2018-09-21

## 2018-09-15 RX ORDER — CEFTRIAXONE 500 MG/1
1 INJECTION, POWDER, FOR SOLUTION INTRAMUSCULAR; INTRAVENOUS ONCE
Qty: 0 | Refills: 0 | Status: COMPLETED | OUTPATIENT
Start: 2018-09-15 | End: 2018-09-15

## 2018-09-15 RX ORDER — AMLODIPINE BESYLATE 2.5 MG/1
5 TABLET ORAL DAILY
Qty: 0 | Refills: 0 | Status: DISCONTINUED | OUTPATIENT
Start: 2018-09-15 | End: 2018-09-21

## 2018-09-15 RX ORDER — ATORVASTATIN CALCIUM 80 MG/1
40 TABLET, FILM COATED ORAL AT BEDTIME
Qty: 0 | Refills: 0 | Status: DISCONTINUED | OUTPATIENT
Start: 2018-09-15 | End: 2018-09-21

## 2018-09-15 RX ADMIN — HEPARIN SODIUM 5000 UNIT(S): 5000 INJECTION INTRAVENOUS; SUBCUTANEOUS at 21:09

## 2018-09-15 RX ADMIN — MEMANTINE HYDROCHLORIDE 10 MILLIGRAM(S): 10 TABLET ORAL at 05:07

## 2018-09-15 RX ADMIN — CEFTRIAXONE 100 GRAM(S): 500 INJECTION, POWDER, FOR SOLUTION INTRAMUSCULAR; INTRAVENOUS at 00:36

## 2018-09-15 RX ADMIN — Medication 650 MILLIGRAM(S): at 03:58

## 2018-09-15 RX ADMIN — MEMANTINE HYDROCHLORIDE 10 MILLIGRAM(S): 10 TABLET ORAL at 17:14

## 2018-09-15 RX ADMIN — HEPARIN SODIUM 5000 UNIT(S): 5000 INJECTION INTRAVENOUS; SUBCUTANEOUS at 12:58

## 2018-09-15 RX ADMIN — Medication 650 MILLIGRAM(S): at 04:50

## 2018-09-15 RX ADMIN — MEROPENEM 100 MILLIGRAM(S): 1 INJECTION INTRAVENOUS at 05:06

## 2018-09-15 RX ADMIN — TAMSULOSIN HYDROCHLORIDE 0.4 MILLIGRAM(S): 0.4 CAPSULE ORAL at 21:09

## 2018-09-15 RX ADMIN — ATORVASTATIN CALCIUM 40 MILLIGRAM(S): 80 TABLET, FILM COATED ORAL at 21:08

## 2018-09-15 RX ADMIN — AMLODIPINE BESYLATE 5 MILLIGRAM(S): 2.5 TABLET ORAL at 05:07

## 2018-09-15 RX ADMIN — Medication 250 MILLIGRAM(S): at 05:33

## 2018-09-15 RX ADMIN — Medication 81 MILLIGRAM(S): at 11:06

## 2018-09-15 RX ADMIN — HEPARIN SODIUM 5000 UNIT(S): 5000 INJECTION INTRAVENOUS; SUBCUTANEOUS at 05:07

## 2018-09-15 RX ADMIN — SODIUM CHLORIDE 75 MILLILITER(S): 9 INJECTION INTRAMUSCULAR; INTRAVENOUS; SUBCUTANEOUS at 10:07

## 2018-09-15 RX ADMIN — Medication 325 MILLIGRAM(S): at 11:06

## 2018-09-15 RX ADMIN — DONEPEZIL HYDROCHLORIDE 10 MILLIGRAM(S): 10 TABLET, FILM COATED ORAL at 21:08

## 2018-09-15 NOTE — H&P ADULT - NSHPLABSRESULTS_GEN_ALL_CORE
10.5   11.39 )-----------( 210      ( 14 Sep 2018 23:30 )             31.6           134<L>  |  95<L>  |  46<H>  ----------------------------<  252<H>  5.4<H>   |  15<L>  |  6.5<HH>    Ca    8.4<L>      14 Sep 2018 23:30    TPro  7.2  /  Alb  3.4<L>  /  TBili  0.3  /  DBili  x   /  AST  25  /  ALT  11  /  AlkPhos  171<H>                Urinalysis Basic - ( 15 Sep 2018 00:03 )    Color: Dark Yellow / Appearance: Cloudy / S.015 / pH: x  Gluc: x / Ketone: Negative  / Bili: Negative / Urobili: 0.2 mg/dL   Blood: x / Protein: Trace mg/dL / Nitrite: Negative   Leuk Esterase: Negative / RBC: >50 /HPF / WBC x   Sq Epi: x / Non Sq Epi: Occasional /HPF / Bacteria: occ /HPF        PT/INR - ( 14 Sep 2018 23:30 )   PT: 12.80 sec;   INR: 1.19 ratio         PTT - ( 14 Sep 2018 23:30 )  PTT:34.2 sec    Lactate Trend   @ 23:30 Lactate:1.8             CAPILLARY BLOOD GLUCOSE            Culture Results:   Normal Urogenital tianna present ( @ 22:42)                CTH: no acute changes

## 2018-09-15 NOTE — H&P ADULT - ASSESSMENT
# NISHANT on CKD:  - anuria 2/2 obstruction relieved by coude cath insertion  - trend CMP for K and Creat  - Keep I=O, no hydration needed except if O>i  - f/u with urology for BPH and obstructive uropathy.  - f/u with renal for NISHANT on CKD    # Dementia :  - patient is calm , baseline has negative delirium with mild advanced dementia  - environmental control and meds as needed    # DM 2:  - FS and insulin if FS >180 	    # HTN:  - controlled, off ACEI and lasix    # DVT PPX HSQ  Ambulate OOBTC  Diet Renal DASH TLC  DNR/DNI (MOLST in chart)  CHG 4% bath qd and PRN # NISHANT on CKD:  - anuria 2/2 obstruction relieved by coude cath insertion  - trend CMP for K and Creat  - Keep I=O, no hydration needed except if O>i  - f/u with urology for BPH and obstructive uropathy.  - f/u with renal for NISHANT on CKD  - consider changing donepezil to another class of meds to avoid adverse events    # Dementia :  - patient is calm , baseline has negative delirium with mild advanced dementia  - environmental control and meds as needed    # DM 2:  - FS and insulin if FS >180 	    # HTN:  - controlled on CCB, off ACEI and lasix    # DVT PPX HSQ  Ambulate OOBTC  Diet Renal DASH TLC  DNR/DNI (MOLST in chart)  CHG 4% bath qd and PRN

## 2018-09-15 NOTE — ED ADULT NURSE NOTE - CHIEF COMPLAINT QUOTE
pt BIBA from [facility] w/ symptoms of anuria. pt has hx of uti, sepsis criteria met. as per EMS pt disoriented at baseline

## 2018-09-15 NOTE — ED ADULT NURSE NOTE - NSIMPLEMENTINTERV_GEN_ALL_ED
Implemented All Fall with Harm Risk Interventions:  [place] to call system. Call bell, personal items and telephone within reach. Instruct patient to call for assistance. Room bathroom lighting operational. Non-slip footwear when patient is off stretcher. Physically safe environment: no spills, clutter or unnecessary equipment. Stretcher in lowest position, wheels locked, appropriate side rails in place. Provide visual cue, wrist band, yellow gown, etc. Monitor gait and stability. Monitor for mental status changes and reorient to person, place, and time. Review medications for side effects contributing to fall risk. Reinforce activity limits and safety measures with patient and family. Provide visual clues: red socks.

## 2018-09-15 NOTE — H&P ADULT - HISTORY OF PRESENT ILLNESS
anemia, dementia, DLD, HTN, BPH, DM2 presented for fever and lower abdominal pain for 1 day duration.  Patient is a resident at [facility], was discharged on 9/11 from anemia, dementia, DLD, HTN, BPH, DM2 presented for fever and lower abdominal pain for 1 day duration.  Patient is a resident at [facility], was discharged on 9/11 from our hospital after being treated for NISHANT 2/2 dehydration and medications. He was sent in for fever Tmax 100.5, with suprapubic pain and dysuria. Patient is a poor historian and hx was provided partly by NH chart. anemia, dementia, DLD, HTN, CKD, BPH, DM2 presented for fever and lower abdominal pain for 1 day duration.  Patient is a resident at [facility], was discharged on 9/11 from our hospital after being treated for NISHANT 2/2 dehydration and medications. He was sent in for fever Tmax 100.5, with suprapubic pain and dysuria. Patient is a poor historian and hx was provided partly by NH chart. Stat labs and a CXR were obtained in NH which showed a rise in Cr from 1.4 on 9/11 to 5.5 and he had no urine output, and CXR showed L lung infiltrate vs effusion so was BIBEMS for evaluation. In ED coude catheter was inserted and he had 1700 cc output of maroon urine. Patient felt instant relief of abdominal pain. He had no hypotension, syncope, back pain, chest pain, palpitations.    Tried to contact Daughter many times could not get through.

## 2018-09-15 NOTE — CONSULT NOTE ADULT - SUBJECTIVE AND OBJECTIVE BOX
Patient is a 83y old  Male who presents with a chief complaint of Fever and oliguria (15 Sep 2018 10:03)      HPI:  anemia, dementia, DLD, HTN, CKD, BPH, DM2 presented for fever and lower abdominal pain for 1 day duration.  Patient is a resident at [facility], was discharged on  from our hospital after being treated for NISHANT 2/2 dehydration and medications. He was sent in for fever Tmax 100.5, with suprapubic pain and dysuria. Patient is a poor historian and hx was provided partly by NH chart. Stat labs and a CXR were obtained in NH which showed a rise in Cr from 1.4 on  to 5.5 and he had no urine output, and CXR showed L lung infiltrate vs effusion so was BIBEMS for evaluation. In ED coude catheter was inserted and he had 1700 cc output of maroon urine. Patient felt instant relief of abdominal pain. He had no hypotension, syncope, back pain, chest pain, palpitations.    Coude cathter was placed by medicine last night and drained 1700cc of urine. Pt with elevated Cr to 6.5, has hx of CKD II with baseline Cr 1.9. Urine draining well now slightly blood tinged, likely due to decompressive hematuria and should clear up soon.       PAST MEDICAL & SURGICAL HISTORY:  Dementia  Kidney disease  DM (diabetes mellitus)  HTN (hypertension)  H/O knee surgery: right knee          MEDICATIONS  (STANDING):  amLODIPine   Tablet 5 milliGRAM(s) Oral daily  aspirin enteric coated 81 milliGRAM(s) Oral daily  atorvastatin 40 milliGRAM(s) Oral at bedtime  donepezil 10 milliGRAM(s) Oral at bedtime  ferrous    sulfate 325 milliGRAM(s) Oral daily  heparin  Injectable 5000 Unit(s) SubCutaneous every 8 hours  influenza   Vaccine 0.5 milliLiter(s) IntraMuscular once  memantine 10 milliGRAM(s) Oral two times a day  meropenem  IVPB 500 milliGRAM(s) IV Intermittent every 24 hours  sodium chloride 0.9%. 1000 milliLiter(s) (75 mL/Hr) IV Continuous <Continuous>  tamsulosin 0.4 milliGRAM(s) Oral at bedtime    MEDICATIONS  (PRN):  acetaminophen  Suppository .. 650 milliGRAM(s) Rectal every 6 hours PRN Temp greater or equal to 38.5C (101.3F)      Allergies    No Known Allergies    Intolerances        SOCIAL HISTORY: No illicit drug use    FAMILY HISTORY:  No pertinent family history in first degree relatives      Vital Signs Last 24 Hrs  T(C): 36.4 (15 Sep 2018 06:57), Max: 38.5 (15 Sep 2018 04:00)  T(F): 97.6 (15 Sep 2018 06:57), Max: 101.3 (15 Sep 2018 04:00)  HR: 101 (15 Sep 2018 06:57) (101 - 114)  BP: 116/71 (15 Sep 2018 03:42) (102/53 - 116/71)  BP(mean): --  RR: 20 (15 Sep 2018 03:42) (18 - 20)  SpO2: 96% (15 Sep 2018 03:42) (93% - 96%)    PHYSICAL EXAM:    Constitutional: NAD, well-developed, awake/alert  HEENT: EOMI  Neck: no pain  Back: No CVA tenderness b/l  Respiratory: No accessory respiratory muscle use  Abd: Soft, NT/ND, bladder non palpable, no suprapubic tenderness  : + de leon cath draining clear slightly blood tinged urine.      I&O's Summary    14 Sep 2018 07:01  -  15 Sep 2018 07:00  --------------------------------------------------------  IN: 450 mL / OUT: 2700 mL / NET: -2250 mL    15 Sep 2018 07:  -  15 Sep 2018 11:10  --------------------------------------------------------  IN: 100 mL / OUT: 0 mL / NET: 100 mL        LABS:                        10.5   11.39 )-----------( 210      ( 14 Sep 2018 23:30 )             31.6     09-14    134<L>  |  95<L>  |  46<H>  ----------------------------<  252<H>  5.4<H>   |  15<L>  |  6.5<HH>    Ca    8.4<L>      14 Sep 2018 23:30    TPro  7.2  /  Alb  3.4<L>  /  TBili  0.3  /  DBili  x   /  AST  25  /  ALT  11  /  AlkPhos  171<H>  09-14    PT/INR - ( 14 Sep 2018 23:30 )   PT: 12.80 sec;   INR: 1.19 ratio         PTT - ( 14 Sep 2018 23:30 )  PTT:34.2 sec  Urinalysis Basic - ( 15 Sep 2018 00:03 )    Color: Dark Yellow / Appearance: Cloudy / S.015 / pH: x  Gluc: x / Ketone: Negative  / Bili: Negative / Urobili: 0.2 mg/dL   Blood: x / Protein: Trace mg/dL / Nitrite: Negative   Leuk Esterase: Negative / RBC: >50 /HPF / WBC x   Sq Epi: x / Non Sq Epi: Occasional /HPF / Bacteria: occ /HPF      Urine Culture:         RADIOLOGY & ADDITIONAL STUDIES:

## 2018-09-15 NOTE — H&P ADULT - NSHPREVIEWOFSYSTEMS_GEN_ALL_CORE
REVIEW OF SYSTEMS    CONSTITUTIONAL: No weakness, fevers or chills  EYES/ENT: No visual changes;  No vertigo or throat pain   NECK: No pain or stiffness. No cervical midline tenderness.  RESPIRATORY: No difficulty breathing, cough, wheezing.  CARDIOVASCULAR: No chest pain or palpitations  GASTROINTESTINAL: No abdominal or epigastric pain. No nausea, vomiting, or hematemesis. No diarrhea or constipation. No melena, BRBPR, or hematochezia.  GENITOURINARY: No dysuria, frequency or hematuria.  NEUROLOGICAL: No headache. No numbness or weakness.

## 2018-09-15 NOTE — CONSULT NOTE ADULT - SUBJECTIVE AND OBJECTIVE BOX
AUBRE[facility]  INITIAL CONSULT NOTE  --------------------------------------------------------------------------------  HPI:        PAST HISTORY  --------------------------------------------------------------------------------  PAST MEDICAL & SURGICAL HISTORY:  Dementia  Kidney disease  DM (diabetes mellitus)  HTN (hypertension)  H/O knee surgery: right knee    FAMILY HISTORY:  No pertinent family history in first degree relatives    PAST SOCIAL HISTORY:    ALLERGIES & MEDICATIONS  --------------------------------------------------------------------------------  Allergies    No Known Allergies        Standing Inpatient Medications  amLODIPine   Tablet 5 milliGRAM(s) Oral daily  aspirin enteric coated 81 milliGRAM(s) Oral daily  atorvastatin 40 milliGRAM(s) Oral at bedtime  donepezil 10 milliGRAM(s) Oral at bedtime  ferrous    sulfate 325 milliGRAM(s) Oral daily  heparin  Injectable 5000 Unit(s) SubCutaneous every 8 hours  influenza   Vaccine 0.5 milliLiter(s) IntraMuscular once  memantine 10 milliGRAM(s) Oral two times a day  meropenem  IVPB 500 milliGRAM(s) IV Intermittent every 24 hours  sodium chloride 0.9%. 1000 milliLiter(s) IV Continuous <Continuous>  tamsulosin 0.4 milliGRAM(s) Oral at bedtime        VITALS/PHYSICAL EXAM  --------------------------------------------------------------------------------  T(C): 36.4 (09-15-18 @ 06:57), Max: 38.5 (09-15-18 @ 04:00)  HR: 101 (09-15-18 @ 06:57) (101 - 114)  BP: 116/71 (09-15-18 @ 03:42) (102/53 - 116/71)  RR: 20 (09-15-18 @ 03:42) (18 - 20)  SpO2: 96% (09-15-18 @ 03:42) (93% - 96%)  Wt(kg): --  Height (cm): 177.8 (09-15-18 @ 04:00)  Weight (kg): 96.2 (09-15-18 @ 04:00)  BMI (kg/m2): 30.4 (09-15-18 @ 04:00)  BSA (m2): 2.14 (09-15-18 @ 04:00)      09-14-18 @ 07:01  -  09-15-18 @ 07:00  --------------------------------------------------------  IN: 450 mL / OUT: 2700 mL / NET: -2250 mL    09-15-18 @ 07:01  -  09-15-18 @ 10:04  --------------------------------------------------------  IN: 100 mL / OUT: 0 mL / NET: 100 mL      Physical Exam:  	Gen: NAD, lethargic   	Pulm:  decrease BS B/L  	CV:  S1S2; no rub  	Abd: +distended  	: moises   	LE:  no edema  	    LABS/STUDIES  --------------------------------------------------------------------------------              10.5   11.39 >-----------<  210      [09-14-18 @ 23:30]              31.6     134  |  95  |  46  ----------------------------<  252      [09-14-18 @ 23:30]  5.4   |  15  |  6.5        Ca     8.4     [09-14-18 @ 23:30]    TPro  7.2  /  Alb  3.4  /  TBili  0.3  /  DBili  x   /  AST  25  /  ALT  11  /  AlkPhos  171  [09-14-18 @ 23:30]    PT/INR: PT 12.80, INR 1.19       [09-14-18 @ 23:30]  PTT: 34.2       [09-14-18 @ 23:30]      Creatinine Trend:  SCr 6.5 [09-14 @ 23:30]  SCr 1.2 [09-10 @ 07:55]  SCr 1.2 [09-09 @ 06:49]  SCr 1.2 [09-08 @ 11:24]  SCr 1.9 [09-07 @ 00:45]    Urinalysis - [09-15-18 @ 00:03]      Color Dark Yellow / Appearance Cloudy / SG 1.015 / pH 5.5      Gluc Negative / Ketone Negative  / Bili Negative / Urobili 0.2       Blood Large / Protein Trace / Leuk Est Negative / Nitrite Negative      RBC >50 / WBC  / Hyaline  / Gran  / Sq Epi  / Non Sq Epi Occasional / Bacteria occ      TSH 1.21      [09-08-18 @ 11:24]      Syphilis Screen (Treponema Pallidum Ab) Negative      [09-08-18 @ 11:24] 83 y old male with pmh as below, NH presenting to the hospital with   fever and lower abdominal pain for 1 day duration.   In ED coude catheter was inserted and he had 1700 cc output of maroon urine.   history from the chart  patient is a poor historian          PAST HISTORY  --------------------------------------------------------------------------------  PAST MEDICAL & SURGICAL HISTORY:  Dementia  Kidney disease  DM (diabetes mellitus)  HTN (hypertension)  H/O knee surgery: right knee    FAMILY HISTORY:  No pertinent family history in first degree relatives    PAST SOCIAL HISTORY:    ALLERGIES & MEDICATIONS  --------------------------------------------------------------------------------  Allergies    No Known Allergies        Standing Inpatient Medications  amLODIPine   Tablet 5 milliGRAM(s) Oral daily  aspirin enteric coated 81 milliGRAM(s) Oral daily  atorvastatin 40 milliGRAM(s) Oral at bedtime  donepezil 10 milliGRAM(s) Oral at bedtime  ferrous    sulfate 325 milliGRAM(s) Oral daily  heparin  Injectable 5000 Unit(s) SubCutaneous every 8 hours  influenza   Vaccine 0.5 milliLiter(s) IntraMuscular once  memantine 10 milliGRAM(s) Oral two times a day  meropenem  IVPB 500 milliGRAM(s) IV Intermittent every 24 hours  sodium chloride 0.9%. 1000 milliLiter(s) IV Continuous <Continuous>  tamsulosin 0.4 milliGRAM(s) Oral at bedtime        VITALS/PHYSICAL EXAM  --------------------------------------------------------------------------------  T(C): 36.4 (09-15-18 @ 06:57), Max: 38.5 (09-15-18 @ 04:00)  HR: 101 (09-15-18 @ 06:57) (101 - 114)  BP: 116/71 (09-15-18 @ 03:42) (102/53 - 116/71)  RR: 20 (09-15-18 @ 03:42) (18 - 20)  SpO2: 96% (09-15-18 @ 03:42) (93% - 96%)  Wt(kg): --  Height (cm): 177.8 (09-15-18 @ 04:00)  Weight (kg): 96.2 (09-15-18 @ 04:00)  BMI (kg/m2): 30.4 (09-15-18 @ 04:00)  BSA (m2): 2.14 (09-15-18 @ 04:00)      09-14-18 @ 07:01  -  09-15-18 @ 07:00  --------------------------------------------------------  IN: 450 mL / OUT: 2700 mL / NET: -2250 mL    09-15-18 @ 07:01  -  09-15-18 @ 10:04  --------------------------------------------------------  IN: 100 mL / OUT: 0 mL / NET: 100 mL      Physical Exam:  	Gen: NAD, lethargic   	Pulm:  decrease BS B/L  	CV:  S1S2; no rub  	Abd: +distended  	: moises   	LE:  no edema  	    LABS/STUDIES  --------------------------------------------------------------------------------              10.5   11.39 >-----------<  210      [09-14-18 @ 23:30]              31.6     134  |  95  |  46  ----------------------------<  252      [09-14-18 @ 23:30]  5.4   |  15  |  6.5        Ca     8.4     [09-14-18 @ 23:30]    TPro  7.2  /  Alb  3.4  /  TBili  0.3  /  DBili  x   /  AST  25  /  ALT  11  /  AlkPhos  171  [09-14-18 @ 23:30]    PT/INR: PT 12.80, INR 1.19       [09-14-18 @ 23:30]  PTT: 34.2       [09-14-18 @ 23:30]      Creatinine Trend:  SCr 6.5 [09-14 @ 23:30]  SCr 1.2 [09-10 @ 07:55]  SCr 1.2 [09-09 @ 06:49]  SCr 1.2 [09-08 @ 11:24]  SCr 1.9 [09-07 @ 00:45]    Urinalysis - [09-15-18 @ 00:03]      Color Dark Yellow / Appearance Cloudy / SG 1.015 / pH 5.5      Gluc Negative / Ketone Negative  / Bili Negative / Urobili 0.2       Blood Large / Protein Trace / Leuk Est Negative / Nitrite Negative      RBC >50 / WBC  / Hyaline  / Gran  / Sq Epi  / Non Sq Epi Occasional / Bacteria occ      TSH 1.21      [09-08-18 @ 11:24]      Syphilis Screen (Treponema Pallidum Ab) Negative      [09-08-18 @ 11:24]

## 2018-09-15 NOTE — CONSULT NOTE ADULT - SUBJECTIVE AND OBJECTIVE BOX
YAZAN WALKER 83yMalePatient is a 83y old  Male who presents with a chief complaint of Fever and oliguria (15 Sep 2018 08:33)      Patient has history of:  No Known Allergies      Adult/Nursing Home residence    RENAL FAILURE;UTI (URINARY TRACT INFECTION)  ^POSSIBLE UTI  Yes  No pertinent family history in first degree relatives  MEWS Score  Dementia  Kidney disease  DM (diabetes mellitus)  HTN (hypertension)  Renal failure  H/O knee surgery  POSSIBLE UTI  3  UTI (urinary tract infection)        Patient treated with:  meropenem  IVPB 500 milliGRAM(s) IV Intermittent every 24 hours        PHYSICAL EXAM  T(F): 97.6 (09-15-18 @ 06:57), Max: 101.3 (09-15-18 @ 04:00)  HR: 101 (09-15-18 @ 06:57) (101 - 114)  BP: 116/71 (09-15-18 @ 03:42) (102/53 - 116/71)  RR: 20 (09-15-18 @ 03:42) (18 - 20)  SpO2: 96% (09-15-18 @ 03:42) (93% - 96%)  Daily Height in cm: 177.8 (15 Sep 2018 04:00)    Daily   HEENT: normal, no nuchal rigidity  Cor: RSR Nl S1 S2  Lungs: clear  Decreased breath sounds at bases    Abdomen: Nontender, Nl BS,     Ext: No clubbing,cyanosis or edema    LAB & RADIOLOGIC RESULTS:                        10.5   11.39 )-----------( 210      ( 14 Sep 2018 23:30 )             31.6             134<L>  |  95<L>  |  46<H>  ----------------------------<  252<H>  5.4<H>   |  15<L>  |  6.5<HH>      TPro  7.2  /  Alb  3.4<L>  /  TBili  0.3  /  DBili  x   /  AST  25  /  ALT  11  /  AlkPhos  171<H>        Sodium, Serum: 134 mmol/L (18 @ 23:30)    Hyponatremia                  Creatinine, Serum: 6.5 mg/dL (18 @ 23:30)  eGFR if Non African American: 7 mL/min/1.73M2 (18 @ 23:30)  eGFR if : 8 mL/min/1.73M2 (18 @ 23:30)    Acute Kidney Injury        Urinalysis Basic - ( 15 Sep 2018 00:03 )    Color: Dark Yellow / Appearance: Cloudy / S.015 / pH: x  Gluc: x / Ketone: Negative  / Bili: Negative / Urobili: 0.2 mg/dL   Blood: x / Protein: Trace mg/dL / Nitrite: Negative   Leuk Esterase: Negative / RBC: >50 /HPF / WBC x   Sq Epi: x / Non Sq Epi: Occasional /HPF / Bacteria: occ /HPF      PT/INR - ( 14 Sep 2018 23:30 )   PT: 12.80 sec;   INR: 1.19 ratio         PTT - ( 14 Sep 2018 23:30 )  PTT:34.2 sec

## 2018-09-15 NOTE — PROGRESS NOTE ADULT - SUBJECTIVE AND OBJECTIVE BOX
YAZAN WALKER  83y  Male      Patient is a 83y old  Male who presents with a chief complaint of increased temperature (15 Sep 2018 04:27)    obliguria,NISHANT on ckd#3    REVIEW OF SYSTEMS:  CONSTITUTIONAL: No fever, weight loss, or fatigue  EYES: No eye pain, visual disturbances, or discharge  ENMT:  No difficulty hearing, tinnitus, vertigo; No sinus or throat pain  NECK: No pain or stiffness  BREASTS: No pain, masses, or nipple discharge  RESPIRATORY: No cough, wheezing, chills or hemoptysis; No shortness of breath  CARDIOVASCULAR: No chest pain, palpitations, dizziness, or leg swelling  GASTROINTESTINAL: No abdominal or epigastric pain. No nausea, vomiting, or hematemesis; No diarrhea or constipation. No melena or hematochezia.  GENITOURINARY: No dysuria, frequency, hematuria, or incontinence  NEUROLOGICAL: No headaches, memory loss, loss of strength, numbness, or tremors  SKIN: No itching, burning, rashes, or lesions   LYMPH NODES: No enlarged glands  ENDOCRINE: No heat or cold intolerance; No hair loss  MUSCULOSKELETAL: No joint pain or swelling; No muscle, back, or extremity pain  PSYCHIATRIC: No depression, anxiety, mood swings, or difficulty sleeping  HEME/LYMPH: No easy bruising, or bleeding gums  ALLERY AND IMMUNOLOGIC: No hives or eczema  FAMILY HISTORY:  No pertinent family history in first degree relatives    T(C): 36.4 (09-15-18 @ 06:57), Max: 38.5 (09-15-18 @ 04:00)  HR: 101 (09-15-18 @ 06:57) (101 - 114)  BP: 116/71 (09-15-18 @ 03:42) (102/53 - 116/71)  RR: 20 (09-15-18 @ 03:42) (18 - 20)  SpO2: 96% (09-15-18 @ 03:42) (93% - 96%)  Wt(kg): --Vital Signs Last 24 Hrs  T(C): 36.4 (15 Sep 2018 06:57), Max: 38.5 (15 Sep 2018 04:00)  T(F): 97.6 (15 Sep 2018 06:57), Max: 101.3 (15 Sep 2018 04:00)  HR: 101 (15 Sep 2018 06:57) (101 - 114)  BP: 116/71 (15 Sep 2018 03:42) (102/53 - 116/71)  BP(mean): --  RR: 20 (15 Sep 2018 03:42) (18 - 20)  SpO2: 96% (15 Sep 2018 03:42) (93% - 96%)  No Known Allergies  urine out put 2700ml overnight    PHYSICAL EXAM:  GENERAL: NAD,   HEAD:  Atraumatic, Normocephalic  EYES: EOMI, PERRLA, conjunctiva and sclera clear  ENMT: No tonsillar erythema, exudates, or enlargement;   NECK: Supple, No JVD, Normal thyroid  NERVOUS SYSTEM:  Alert & Oriented X3,   CHEST/LUNG: Clear to percussion bilaterally; No rales, rhonchi, wheezing, or rubs  HEART: Regular rate and rhythm; No murmurs, rubs, or gallops  ABDOMEN: Soft, Nontender, Nondistended; Bowel sounds present  EXTREMITIES:  2+ Peripheral Pulses, No clubbing, cyanosis, or edema  LYMPH: No lymphadenopathy noted  SKIN: No rashes or lesions      LABS:  09-14    134<L>  |  95<L>  |  46<H>  ----------------------------<  252<H>  5.4<H>   |  15<L>  |  6.5<HH>    Ca    8.4<L>      14 Sep 2018 23:30    TPro  7.2  /  Alb  3.4<L>  /  TBili  0.3  /  DBili  x   /  AST  25  /  ALT  11  /  AlkPhos  171<H>  09-14                          10.5   11.39 )-----------( 210      ( 14 Sep 2018 23:30 )             31.6         RADIOLOGY & ADDITIONAL TESTS:    MEDICATION:  acetaminophen  Suppository .. 650 milliGRAM(s) Rectal every 6 hours PRN  amLODIPine   Tablet 5 milliGRAM(s) Oral daily  aspirin enteric coated 81 milliGRAM(s) Oral daily  atorvastatin 40 milliGRAM(s) Oral at bedtime  donepezil 10 milliGRAM(s) Oral at bedtime  ferrous    sulfate 325 milliGRAM(s) Oral daily  heparin  Injectable 5000 Unit(s) SubCutaneous every 8 hours  influenza   Vaccine 0.5 milliLiter(s) IntraMuscular once  memantine 10 milliGRAM(s) Oral two times a day  meropenem  IVPB 500 milliGRAM(s) IV Intermittent every 24 hours  tamsulosin 0.4 milliGRAM(s) Oral at bedtime      HEALTH ISSUES - PROBLEM Dx:    NISHANT on ckd#3,base line 1.8, s/p iv fluids overnight 2700 ml urine out put,renal consult,renal sonogram ,on de leon  Fever ,sepsis probably UTI,on meropenam,s/p vancomycin, id consult  HTN on amlodipine  Hyperkelemia repeat lab  dementia on doepezil,namenda

## 2018-09-15 NOTE — H&P ADULT - NSHPPHYSICALEXAM_GEN_ALL_CORE
PHYSICAL EXAM:    T(C): 38.2 (09-14-18 @ 23:38), Max: 38.3 (09-14-18 @ 23:11)  HR: 106 (09-14-18 @ 23:11) (106 - 106)  BP: 102/53 (09-14-18 @ 23:11) (102/53 - 102/53)  RR: 18 (09-14-18 @ 23:11) (18 - 18)  SpO2: 93% (09-14-18 @ 23:11) (93% - 93%)    Constitutional: NAD, sleeping comfortably in bed  HEENT: Neck supple, No oral lesions, no throat redness or swelling  Respiratory: Breath sounds  CTA b/l, no accessory muscle use  Cardiovascular: regular rate and rhythm, normal S1 S2, no murmurs  Gastrointestinal: soft non-tender no hepatosplenomegaly, normal BS  Genitourinary: no lesions, no discharge  Extremities: positive peripheral pulses no extremity edema  Neurological: AAO2 no focal motor deficit  Skin: no lesions or wounds  Musculoskeletal: no joint swelling or tenderness PHYSICAL EXAM:    T(C): 38.2 (09-14-18 @ 23:38), Max: 38.3 (09-14-18 @ 23:11)  HR: 106 (09-14-18 @ 23:11) (106 - 106)  BP: 102/53 (09-14-18 @ 23:11) (102/53 - 102/53)  RR: 18 (09-14-18 @ 23:11) (18 - 18)  SpO2: 93% (09-14-18 @ 23:11) (93% - 93%)    Constitutional: NAD, sleeping comfortably in bed  HEENT: Neck supple, No oral lesions, no throat redness or swelling  Respiratory: Breath sounds  CTA b/l, no accessory muscle use  Cardiovascular: regular rate and rhythm, normal S1 S2, no murmurs  Gastrointestinal: soft non-tender no hepatosplenomegaly, normal BS,   Genitourinary: no lesions, no discharge, + de leon cath in meatus no discharge around it, dark maroon urine in bag  Extremities: positive peripheral pulses no extremity edema  Neurological: AAO1 no focal motor deficit  Skin: no lesions or wounds  Musculoskeletal: no joint swelling or tenderness

## 2018-09-16 PROBLEM — I10 ESSENTIAL (PRIMARY) HYPERTENSION: Chronic | Status: ACTIVE | Noted: 2018-09-06

## 2018-09-16 PROBLEM — E11.9 TYPE 2 DIABETES MELLITUS WITHOUT COMPLICATIONS: Chronic | Status: ACTIVE | Noted: 2018-09-06

## 2018-09-16 PROBLEM — F03.90 UNSPECIFIED DEMENTIA WITHOUT BEHAVIORAL DISTURBANCE: Chronic | Status: ACTIVE | Noted: 2018-09-06

## 2018-09-16 PROBLEM — N28.9 DISORDER OF KIDNEY AND URETER, UNSPECIFIED: Chronic | Status: ACTIVE | Noted: 2018-09-06

## 2018-09-16 LAB
ANION GAP SERPL CALC-SCNC: 16 MMOL/L — HIGH (ref 7–14)
BASOPHILS # BLD AUTO: 0.02 K/UL — SIGNIFICANT CHANGE UP (ref 0–0.2)
BASOPHILS NFR BLD AUTO: 0.2 % — SIGNIFICANT CHANGE UP (ref 0–1)
BUN SERPL-MCNC: 45 MG/DL — HIGH (ref 10–20)
CALCIUM SERPL-MCNC: 8.5 MG/DL — SIGNIFICANT CHANGE UP (ref 8.5–10.1)
CHLORIDE SERPL-SCNC: 103 MMOL/L — SIGNIFICANT CHANGE UP (ref 98–110)
CO2 SERPL-SCNC: 21 MMOL/L — SIGNIFICANT CHANGE UP (ref 17–32)
CREAT SERPL-MCNC: 3.9 MG/DL — HIGH (ref 0.7–1.5)
CULTURE RESULTS: NO GROWTH — SIGNIFICANT CHANGE UP
EOSINOPHIL # BLD AUTO: 0.08 K/UL — SIGNIFICANT CHANGE UP (ref 0–0.7)
EOSINOPHIL NFR BLD AUTO: 0.9 % — SIGNIFICANT CHANGE UP (ref 0–8)
GLUCOSE SERPL-MCNC: 133 MG/DL — HIGH (ref 70–99)
HCT VFR BLD CALC: 28.6 % — LOW (ref 42–52)
HGB BLD-MCNC: 9.3 G/DL — LOW (ref 14–18)
IMM GRANULOCYTES NFR BLD AUTO: 0.2 % — SIGNIFICANT CHANGE UP (ref 0.1–0.3)
LYMPHOCYTES # BLD AUTO: 1.38 K/UL — SIGNIFICANT CHANGE UP (ref 1.2–3.4)
LYMPHOCYTES # BLD AUTO: 15.4 % — LOW (ref 20.5–51.1)
MCHC RBC-ENTMCNC: 28.2 PG — SIGNIFICANT CHANGE UP (ref 27–31)
MCHC RBC-ENTMCNC: 32.5 G/DL — SIGNIFICANT CHANGE UP (ref 32–37)
MCV RBC AUTO: 86.7 FL — SIGNIFICANT CHANGE UP (ref 80–94)
MONOCYTES # BLD AUTO: 0.88 K/UL — HIGH (ref 0.1–0.6)
MONOCYTES NFR BLD AUTO: 9.8 % — HIGH (ref 1.7–9.3)
NEUTROPHILS # BLD AUTO: 6.6 K/UL — HIGH (ref 1.4–6.5)
NEUTROPHILS NFR BLD AUTO: 73.5 % — SIGNIFICANT CHANGE UP (ref 42.2–75.2)
NRBC # BLD: 0 /100 WBCS — SIGNIFICANT CHANGE UP (ref 0–0)
PLATELET # BLD AUTO: 242 K/UL — SIGNIFICANT CHANGE UP (ref 130–400)
POTASSIUM SERPL-MCNC: 5.3 MMOL/L — HIGH (ref 3.5–5)
POTASSIUM SERPL-SCNC: 5.3 MMOL/L — HIGH (ref 3.5–5)
RBC # BLD: 3.3 M/UL — LOW (ref 4.7–6.1)
RBC # FLD: 11.9 % — SIGNIFICANT CHANGE UP (ref 11.5–14.5)
SODIUM SERPL-SCNC: 140 MMOL/L — SIGNIFICANT CHANGE UP (ref 135–146)
SPECIMEN SOURCE: SIGNIFICANT CHANGE UP
WBC # BLD: 8.98 K/UL — SIGNIFICANT CHANGE UP (ref 4.8–10.8)
WBC # FLD AUTO: 8.98 K/UL — SIGNIFICANT CHANGE UP (ref 4.8–10.8)

## 2018-09-16 RX ADMIN — MEROPENEM 100 MILLIGRAM(S): 1 INJECTION INTRAVENOUS at 05:28

## 2018-09-16 RX ADMIN — SODIUM CHLORIDE 75 MILLILITER(S): 9 INJECTION INTRAMUSCULAR; INTRAVENOUS; SUBCUTANEOUS at 05:29

## 2018-09-16 RX ADMIN — AMLODIPINE BESYLATE 5 MILLIGRAM(S): 2.5 TABLET ORAL at 05:28

## 2018-09-16 RX ADMIN — Medication 325 MILLIGRAM(S): at 11:15

## 2018-09-16 RX ADMIN — MEMANTINE HYDROCHLORIDE 10 MILLIGRAM(S): 10 TABLET ORAL at 05:28

## 2018-09-16 RX ADMIN — MEMANTINE HYDROCHLORIDE 10 MILLIGRAM(S): 10 TABLET ORAL at 17:00

## 2018-09-16 RX ADMIN — HEPARIN SODIUM 5000 UNIT(S): 5000 INJECTION INTRAVENOUS; SUBCUTANEOUS at 13:01

## 2018-09-16 RX ADMIN — HEPARIN SODIUM 5000 UNIT(S): 5000 INJECTION INTRAVENOUS; SUBCUTANEOUS at 21:17

## 2018-09-16 RX ADMIN — DONEPEZIL HYDROCHLORIDE 10 MILLIGRAM(S): 10 TABLET, FILM COATED ORAL at 21:16

## 2018-09-16 RX ADMIN — TAMSULOSIN HYDROCHLORIDE 0.4 MILLIGRAM(S): 0.4 CAPSULE ORAL at 21:16

## 2018-09-16 RX ADMIN — Medication 81 MILLIGRAM(S): at 11:15

## 2018-09-16 RX ADMIN — ATORVASTATIN CALCIUM 40 MILLIGRAM(S): 80 TABLET, FILM COATED ORAL at 21:16

## 2018-09-16 RX ADMIN — HEPARIN SODIUM 5000 UNIT(S): 5000 INJECTION INTRAVENOUS; SUBCUTANEOUS at 05:28

## 2018-09-16 NOTE — PROGRESS NOTE ADULT - SUBJECTIVE AND OBJECTIVE BOX
[facility] FOLLOW UP NOTE  --------------------------------------------------------------------------------  Chief Complaint:    24 hour events/subjective:        PAST HISTORY  --------------------------------------------------------------------------------  No significant changes to PMH, PSH, FHx, SHx, unless otherwise noted    ALLERGIES & MEDICATIONS  --------------------------------------------------------------------------------  Allergies    No Known Allergies    Intolerances      Standing Inpatient Medications  amLODIPine   Tablet 5 milliGRAM(s) Oral daily  aspirin enteric coated 81 milliGRAM(s) Oral daily  atorvastatin 40 milliGRAM(s) Oral at bedtime  donepezil 10 milliGRAM(s) Oral at bedtime  ferrous    sulfate 325 milliGRAM(s) Oral daily  heparin  Injectable 5000 Unit(s) SubCutaneous every 8 hours  influenza   Vaccine 0.5 milliLiter(s) IntraMuscular once  memantine 10 milliGRAM(s) Oral two times a day  meropenem  IVPB 500 milliGRAM(s) IV Intermittent every 24 hours  sodium chloride 0.9%. 1000 milliLiter(s) IV Continuous <Continuous>  tamsulosin 0.4 milliGRAM(s) Oral at bedtime    PRN Inpatient Medications  acetaminophen  Suppository .. 650 milliGRAM(s) Rectal every 6 hours PRN      REVIEW OF SYSTEMS  --------------------------------------------------------------------------------  Gen: No weight changes, fatigue, fevers/chills, weakness  Skin: No rashes  Head/Eyes/Ears/Mouth: No headache; Normal hearing; Normal vision w/o blurriness; No sinus pain/discomfort, sore throat  Respiratory: No dyspnea, cough, wheezing, hemoptysis  CV: No chest pain, PND, orthopnea  GI: No abdominal pain, diarrhea, constipation, nausea, vomiting, melena, hematochezia  : No increased frequency, dysuria, hematuria, nocturia  MSK: No joint pain/swelling; no back pain; no edema  Neuro: No dizziness/lightheadedness, weakness, seizures, numbness, tingling  Heme: No easy bruising or bleeding  Endo: No heat/cold intolerance  Psych: No significant nervousness, anxiety, stress, depression    All other systems were reviewed and are negative, except as noted.    VITALS/PHYSICAL EXAM  --------------------------------------------------------------------------------  T(C): 37.1 (09-16-18 @ 13:51), Max: 37.4 (09-15-18 @ 20:59)  HR: 84 (09-16-18 @ 13:51) (84 - 95)  BP: 108/58 (09-16-18 @ 13:51) (108/58 - 120/64)  RR: 18 (09-16-18 @ 13:51) (18 - 18)  SpO2: --  Wt(kg): --  Height (cm): 177.8 (09-15-18 @ 04:00)  Weight (kg): 96.2 (09-15-18 @ 04:00)  BMI (kg/m2): 30.4 (09-15-18 @ 04:00)  BSA (m2): 2.14 (09-15-18 @ 04:00)      09-15-18 @ 07:01  -  09-16-18 @ 07:00  --------------------------------------------------------  IN: 725 mL / OUT: 1850 mL / NET: -1125 mL    09-16-18 @ 07:01  -  09-16-18 @ 14:30  --------------------------------------------------------  IN: 440 mL / OUT: 750 mL / NET: -310 mL      Physical Exam:  	Gen: NAD, well-appearing  	HEENT: PERRL, supple neck, clear oropharynx  	Pulm: CTA B/L  	CV: RRR, S1S2; no rub  	Back: No spinal or CVA tenderness; no sacral edema  	Abd: +BS, soft, nontender/nondistended  	: No suprapubic tenderness  	UE: Warm, FROM, no clubbing, intact strength; no edema; no asterixis  	LE: Warm, FROM, no clubbing, intact strength; no edema  	Neuro: No focal deficits, intact gait  	Psych: Normal affect and mood  	Skin: Warm, without rashes  	Vascular access:    LABS/STUDIES  --------------------------------------------------------------------------------              9.3    8.98  >-----------<  242      [09-16-18 @ 07:31]              28.6     140  |  103  |  45  ----------------------------<  133      [09-16-18 @ 07:31]  5.3   |  21  |  3.9        Ca     8.5     [09-16-18 @ 07:31]    TPro  7.2  /  Alb  3.4  /  TBili  0.3  /  DBili  x   /  AST  25  /  ALT  11  /  AlkPhos  171  [09-14-18 @ 23:30]    PT/INR: PT 12.80, INR 1.19       [09-14-18 @ 23:30]  PTT: 34.2       [09-14-18 @ 23:30]      Creatinine Trend:  SCr 3.9 [09-16 @ 07:31]  SCr 5.3 [09-15 @ 11:12]  SCr 6.5 [09-14 @ 23:30]  SCr 1.2 [09-10 @ 07:55]  SCr 1.2 [09-09 @ 06:49]    Urinalysis - [09-15-18 @ 00:03]      Color Dark Yellow / Appearance Cloudy / SG 1.015 / pH 5.5      Gluc Negative / Ketone Negative  / Bili Negative / Urobili 0.2       Blood Large / Protein Trace / Leuk Est Negative / Nitrite Negative      RBC >50 / WBC  / Hyaline  / Gran  / Sq Epi  / Non Sq Epi Occasional / Bacteria occ      TSH 1.21      [09-08-18 @ 11:24]      Syphilis Screen (Treponema Pallidum Ab) Negative      [09-08-18 @ 11:24]

## 2018-09-16 NOTE — PROGRESS NOTE ADULT - ASSESSMENT
Renal function stabilizing, Patient went for repeat sonogram today. Potassium 53 , continue to monitor .. overall status looks poor, no indication for dialysis at this time. labs and meds reviewed.

## 2018-09-17 LAB
ANION GAP SERPL CALC-SCNC: 14 MMOL/L — SIGNIFICANT CHANGE UP (ref 7–14)
BASOPHILS # BLD AUTO: 0.03 K/UL — SIGNIFICANT CHANGE UP (ref 0–0.2)
BASOPHILS NFR BLD AUTO: 0.3 % — SIGNIFICANT CHANGE UP (ref 0–1)
BUN SERPL-MCNC: 41 MG/DL — HIGH (ref 10–20)
CALCIUM SERPL-MCNC: 8.9 MG/DL — SIGNIFICANT CHANGE UP (ref 8.5–10.1)
CHLORIDE SERPL-SCNC: 106 MMOL/L — SIGNIFICANT CHANGE UP (ref 98–110)
CO2 SERPL-SCNC: 23 MMOL/L — SIGNIFICANT CHANGE UP (ref 17–32)
CREAT SERPL-MCNC: 2.9 MG/DL — HIGH (ref 0.7–1.5)
EOSINOPHIL # BLD AUTO: 0.07 K/UL — SIGNIFICANT CHANGE UP (ref 0–0.7)
EOSINOPHIL NFR BLD AUTO: 0.8 % — SIGNIFICANT CHANGE UP (ref 0–8)
GLUCOSE BLDC GLUCOMTR-MCNC: 146 MG/DL — HIGH (ref 70–99)
GLUCOSE BLDC GLUCOMTR-MCNC: 161 MG/DL — HIGH (ref 70–99)
GLUCOSE BLDC GLUCOMTR-MCNC: 184 MG/DL — HIGH (ref 70–99)
GLUCOSE SERPL-MCNC: 133 MG/DL — HIGH (ref 70–99)
HCT VFR BLD CALC: 29.1 % — LOW (ref 42–52)
HGB BLD-MCNC: 9.4 G/DL — LOW (ref 14–18)
IMM GRANULOCYTES NFR BLD AUTO: 0.3 % — SIGNIFICANT CHANGE UP (ref 0.1–0.3)
IRON SATN MFR SERPL: 32 % — SIGNIFICANT CHANGE UP (ref 15–50)
IRON SATN MFR SERPL: 46 UG/DL — SIGNIFICANT CHANGE UP (ref 35–150)
LYMPHOCYTES # BLD AUTO: 1.43 K/UL — SIGNIFICANT CHANGE UP (ref 1.2–3.4)
LYMPHOCYTES # BLD AUTO: 16.3 % — LOW (ref 20.5–51.1)
MCHC RBC-ENTMCNC: 28.2 PG — SIGNIFICANT CHANGE UP (ref 27–31)
MCHC RBC-ENTMCNC: 32.3 G/DL — SIGNIFICANT CHANGE UP (ref 32–37)
MCV RBC AUTO: 87.4 FL — SIGNIFICANT CHANGE UP (ref 80–94)
MONOCYTES # BLD AUTO: 0.98 K/UL — HIGH (ref 0.1–0.6)
MONOCYTES NFR BLD AUTO: 11.2 % — HIGH (ref 1.7–9.3)
NEUTROPHILS # BLD AUTO: 6.21 K/UL — SIGNIFICANT CHANGE UP (ref 1.4–6.5)
NEUTROPHILS NFR BLD AUTO: 71.1 % — SIGNIFICANT CHANGE UP (ref 42.2–75.2)
NRBC # BLD: 0 /100 WBCS — SIGNIFICANT CHANGE UP (ref 0–0)
PLATELET # BLD AUTO: 261 K/UL — SIGNIFICANT CHANGE UP (ref 130–400)
POTASSIUM SERPL-MCNC: 5 MMOL/L — SIGNIFICANT CHANGE UP (ref 3.5–5)
POTASSIUM SERPL-SCNC: 5 MMOL/L — SIGNIFICANT CHANGE UP (ref 3.5–5)
RBC # BLD: 3.33 M/UL — LOW (ref 4.7–6.1)
RBC # FLD: 11.7 % — SIGNIFICANT CHANGE UP (ref 11.5–14.5)
SODIUM SERPL-SCNC: 143 MMOL/L — SIGNIFICANT CHANGE UP (ref 135–146)
TIBC SERPL-MCNC: 146 UG/DL — LOW (ref 220–430)
TRANSFERRIN SERPL-MCNC: 121 MG/DL — LOW (ref 200–360)
UIBC SERPL-MCNC: 100 UG/DL — LOW (ref 110–370)
WBC # BLD: 8.75 K/UL — SIGNIFICANT CHANGE UP (ref 4.8–10.8)
WBC # FLD AUTO: 8.75 K/UL — SIGNIFICANT CHANGE UP (ref 4.8–10.8)

## 2018-09-17 RX ADMIN — HEPARIN SODIUM 5000 UNIT(S): 5000 INJECTION INTRAVENOUS; SUBCUTANEOUS at 21:09

## 2018-09-17 RX ADMIN — Medication 81 MILLIGRAM(S): at 12:19

## 2018-09-17 RX ADMIN — HEPARIN SODIUM 5000 UNIT(S): 5000 INJECTION INTRAVENOUS; SUBCUTANEOUS at 05:17

## 2018-09-17 RX ADMIN — MEMANTINE HYDROCHLORIDE 10 MILLIGRAM(S): 10 TABLET ORAL at 17:59

## 2018-09-17 RX ADMIN — ATORVASTATIN CALCIUM 40 MILLIGRAM(S): 80 TABLET, FILM COATED ORAL at 21:06

## 2018-09-17 RX ADMIN — HEPARIN SODIUM 5000 UNIT(S): 5000 INJECTION INTRAVENOUS; SUBCUTANEOUS at 13:37

## 2018-09-17 RX ADMIN — SODIUM CHLORIDE 75 MILLILITER(S): 9 INJECTION INTRAMUSCULAR; INTRAVENOUS; SUBCUTANEOUS at 00:22

## 2018-09-17 RX ADMIN — Medication 325 MILLIGRAM(S): at 12:19

## 2018-09-17 RX ADMIN — MEROPENEM 100 MILLIGRAM(S): 1 INJECTION INTRAVENOUS at 05:16

## 2018-09-17 RX ADMIN — DONEPEZIL HYDROCHLORIDE 10 MILLIGRAM(S): 10 TABLET, FILM COATED ORAL at 21:05

## 2018-09-17 RX ADMIN — TAMSULOSIN HYDROCHLORIDE 0.4 MILLIGRAM(S): 0.4 CAPSULE ORAL at 21:05

## 2018-09-17 RX ADMIN — MEMANTINE HYDROCHLORIDE 10 MILLIGRAM(S): 10 TABLET ORAL at 05:16

## 2018-09-17 RX ADMIN — AMLODIPINE BESYLATE 5 MILLIGRAM(S): 2.5 TABLET ORAL at 05:16

## 2018-09-17 NOTE — PROGRESS NOTE ADULT - ASSESSMENT
IMPRESSION:  No pyuria.  CT with BPH. No pyelo.  Hematuria.  Culture are negative to date.  See no other focus o infection besides  tract.    RECOMMENDATIONS:  Meropenem 500 mg iv q24h

## 2018-09-17 NOTE — CONSULT NOTE ADULT - SUBJECTIVE AND OBJECTIVE BOX
Patient is a 83y old  Male who presents with a chief complaint of Fever and oliguria (17 Sep 2018 12:06)    HPI:  anemia, dementia, DLD, HTN, CKD, BPH, DM2 presented for fever and lower abdominal pain for 1 day duration.  Patient is a resident at [facility], was discharged on 9/11 from our hospital after being treated for NISHANT 2/2 dehydration and medications. He was sent in for fever Tmax 100.5, with suprapubic pain and dysuria. Patient is a poor historian and hx was provided partly by NH chart. Stat labs and a CXR were obtained in NH which showed a rise in Cr from 1.4 on 9/11 to 5.5 and he had no urine output, and CXR showed L lung infiltrate vs effusion so was BIBEMS for evaluation. In ED coude catheter was inserted and he had 1700 cc output of maroon urine. Patient felt instant relief of abdominal pain. He had no hypotension, syncope, back pain, chest pain, palpitations.    Tried to contact Daughter many times could not get through. (15 Sep 2018 03:05)      PAST MEDICAL & SURGICAL HISTORY:  Dementia  Kidney disease  DM (diabetes mellitus)  HTN (hypertension)  H/O knee surgery: right knee      Hospital Course: on antibiotics for UTI- De Leon placed 1700 cc in ERx  NISHANT  TODAY'S SUBJECTIVE & REVIEW OF SYMPTOMS:     Constitutional Weakness/ poor po   Cardio WNL   Resp WNL   GI WNL  Heme WNL  Endo WNL  Skin WNL  MSK WNL  Neuro WNL  Cognitive confusion  Psych WNL      MEDICATIONS  (STANDING):  amLODIPine   Tablet 5 milliGRAM(s) Oral daily  aspirin enteric coated 81 milliGRAM(s) Oral daily  atorvastatin 40 milliGRAM(s) Oral at bedtime  donepezil 10 milliGRAM(s) Oral at bedtime  ferrous    sulfate 325 milliGRAM(s) Oral daily  heparin  Injectable 5000 Unit(s) SubCutaneous every 8 hours  influenza   Vaccine 0.5 milliLiter(s) IntraMuscular once  memantine 10 milliGRAM(s) Oral two times a day  meropenem  IVPB 500 milliGRAM(s) IV Intermittent every 24 hours  tamsulosin 0.4 milliGRAM(s) Oral at bedtime    MEDICATIONS  (PRN):  acetaminophen  Suppository .. 650 milliGRAM(s) Rectal every 6 hours PRN Temp greater or equal to 38.5C (101.3F)      FAMILY HISTORY:  No pertinent family history in first degree relatives      Allergies    No Known Allergies    Intolerances        SOCIAL HISTORY:    [    ] Etoh  [    ] Smoking  [    ] Substance abuse     Home Environment:  [    ] Home Alone  [  x  ] Lives with Family  [    ] Home Health Aid    Dwelling:  [    ] Apartment  [    ] Private House  [    ] Adult Home  [ x   ] Skilled Nursing Facility      [  x  ] Short Term  [    ] Long Term  [    ] Stairs                           [    ] Elevator     FUNCTIONAL STATUS PTA: (Check all that apply)  Ambulation: [     ]Independent    [   x ] Dependent     [    ] Non-Ambulatory  Assistive Device: [    ] SA Cane  [    ]  Q Cane  [    ] Walker  [ x   ]  Wheelchair  ADL : [    ] Independent  [ x   ]  Dependent       Vital Signs Last 24 Hrs  T(C): 36.9 (17 Sep 2018 13:22), Max: 37.1 (16 Sep 2018 21:02)  T(F): 98.5 (17 Sep 2018 13:22), Max: 98.8 (16 Sep 2018 21:02)  HR: 90 (17 Sep 2018 13:22) (88 - 99)  BP: 134/71 (17 Sep 2018 13:22) (134/71 - 175/90)  BP(mean): --  RR: 18 (17 Sep 2018 13:22) (18 - 18)  SpO2: --      PHYSICAL EXAM: Alert & Oriented X1 follows simple commands  GENERAL: NAD, well-groomed, well-developed  HEAD:  Atraumatic, Normocephalic  EYES: EOMI, PERRLA, conjunctiva and sclera clear  NECK: Supple, No JVD, Normal thyroid  CHEST/LUNG: Clear to percussion bilaterally; No rales, rhonchi, wheezing, or rubs  HEART: Regular rate and rhythm; No murmurs, rubs, or gallops  ABDOMEN: Soft, Nontender, Nondistended; Bowel sounds present +de leon  EXTREMITIES:  1+ edema BLES    NERVOUS SYSTEM:  Cranial Nerves 2-12 intact [ x   ] Abnormal  [    ]  ROM: WFL all extremities [ x   ]  Abnormal [     ]  Motor Strength: WFL all extremities  [    ]  Abnormal [ x   ]3-/5 all ext  Sensation: intact to light touch [ x ] Abnormal [    ]  Diffuse rigidity BLES- +cogwheeling L wrist    FUNCTIONAL STATUS:  Bed Mobility: [   ]  Independent [    ]  Supervision [  x  ]  Needs Assistance [  ]  N/A  Transfers: [    ]  Independent [    ]  Supervision [ x   ]  Needs Assistance [    ]  N/A    Ambulation:  [    ]  Independent [    ]  Supervision [    ]  Needs Assistance [    ]  N/A   ADL:  [    ]   Independent [    ] Requires Assistance [    ] N/A   MAX A BED LUKE DEL ROSARIO    LABS:                        9.4    8.75  )-----------( 261      ( 17 Sep 2018 06:46 )             29.1     09-17    143  |  106  |  41<H>  ----------------------------<  133<H>  5.0   |  23  |  2.9<H>    Ca    8.9      17 Sep 2018 06:46            RADIOLOGY & ADDITIONAL STUDIES:

## 2018-09-17 NOTE — CONSULT NOTE ADULT - ASSESSMENT
IMPRESSION: Rehab of Debilitation UTI Dementia ?parkinsonism    PRECAUTIONS: [  x  ] Cardiac  [    ] Respiratory  [    ] Seizures [    ] Contact Isolation  [    ] Droplet Isolation  [    ] Other    Weight Bearing Status:     RECOMMENDATION: Neuro eval ? Trial sinemet    Out of Bed to Chair     DVT/Decubiti Prophylaxis    REHAB PLAN:     [  x   ] Bedside P/T 3-5 times a week   [     ] Bedside O/T  2-3 times a week   [     ] No Rehab Therapy Indicated   [     ]  Speech Therapy   Conditioning/ROM                                 ADL  Bed Mobility                                            Conditioning/ROM  Transfers                                                  Bed Mobility  Sitting /Standing Balance                      Transfers                                        Gait Training                                            Sitting/Standing Balance  Stair Training [   ]Applicable                 Home equipment Eval                                                                     Splinting  [   ] Only      GOALS:   ADL   [    ]   Independent         Transfers  [    ] Independent            Ambulation  [     ] Independent     [ x    ] With device                            [  x  ]  CG                                               [  x  ]  CG                                                    [  x   ] CG                            [    ] Min A                                          [    ] Min A                                                [     ] Min  A          DISCHARGE PLAN:   [     ]  Good candidate for Intensive Rehabilitation/Hospital based-4A [facility]                                             Will tolerate 3hrs Intensive Rehab Daily                                       [  x    ]  Short Term Rehab in Skilled Nursing Facility ?LTC                                       [      ]  Home with Outpatient or VN services                                         [      ]  Possible Candidate for Intensive Hospital based Rehab
84 y/o male with urinary retention  - maintain cathter  - do not remove cathter for at least 2 weeks  - Decompressive hematuria is expected and should clear up on its own, irrigate prn  - start Flomax if no contraindications  - UA, UCx  - Will d/w attending
IMPRESSION  Pt from SNF with fever & oliguria (Cr 6.5)   with hyperglycemia  R/O silent cholecystitis in diabetic      Pt with hyponatremia, SIRS (T101.3, tachycardia)    On meropenem  IVPB 500 milliGRAM(s) IV Intermittent every 24 hours    U/A Nitrite: Negative / Leuk Esterase: Negative / RBC: >50 /HPF / WBC x / Bacteria: occ /HPF  No recent Cxray    SUGGESTIONs  Await CAT abdomen  CXray  Await blood cultures and urine culture  Continue meropenem for now  Repeat white blood cell count
NISHANT /HAGMA/ hyperkalemia:  # multifactorial : obstruction with prerenal  # non oliguric  # keep de leon in  # IV fluids : d5 with 3 amp of bicarbonate at 75 cc/h  # check repeat K   # check UC  # BP on the low side, s/c amlodipine  # d/c feso4  # check renal sono  # check ph level  # no acute indication for RRT  # Overall prognosis poor

## 2018-09-17 NOTE — PROGRESS NOTE ADULT - SUBJECTIVE AND OBJECTIVE BOX
SUBJECTIVE:    Patient is a 83y old Male who presents with a chief complaint of Fever and oliguria (17 Sep 2018 09:37)    Currently admitted to medicine with the primary diagnosis of Renal failure     Today is hospital day 2d. No acute events overnight. Patient denies any headache, chest pain, palpitations, dyspnea, adnominal pain, N/V/D/C. Patient will remain with [facility] for minimum of 2 weeks. Creatinine continuing to improve with resolution of urinary obstruction.     PAST MEDICAL & SURGICAL HISTORY  Dementia  Kidney disease  DM (diabetes mellitus)  HTN (hypertension)  H/O knee surgery: right knee    SOCIAL HISTORY:  Negative for smoking/alcohol/drug use.     ALLERGIES:  No Known Allergies    MEDICATIONS:  STANDING MEDICATIONS  amLODIPine   Tablet 5 milliGRAM(s) Oral daily  aspirin enteric coated 81 milliGRAM(s) Oral daily  atorvastatin 40 milliGRAM(s) Oral at bedtime  donepezil 10 milliGRAM(s) Oral at bedtime  ferrous    sulfate 325 milliGRAM(s) Oral daily  heparin  Injectable 5000 Unit(s) SubCutaneous every 8 hours  influenza   Vaccine 0.5 milliLiter(s) IntraMuscular once  memantine 10 milliGRAM(s) Oral two times a day  meropenem  IVPB 500 milliGRAM(s) IV Intermittent every 24 hours  tamsulosin 0.4 milliGRAM(s) Oral at bedtime    PRN MEDICATIONS  acetaminophen  Suppository .. 650 milliGRAM(s) Rectal every 6 hours PRN    VITALS:   T(F): 98.5  HR: 88  BP: 137/68  RR: 18  SpO2: --    LABS:                        9.4    8.75  )-----------( 261      ( 17 Sep 2018 06:46 )             29.1     09-17    143  |  106  |  41<H>  ----------------------------<  133<H>  5.0   |  23  |  2.9<H>    Ca    8.9      17 Sep 2018 06:46    Culture - Urine (collected 15 Sep 2018 00:03)  Source: .Urine Clean Catch (Midstream)  Final Report (16 Sep 2018 14:22):    No growth    Culture - Blood (collected 14 Sep 2018 23:30)  Source: .Blood Blood-Peripheral  Preliminary Report (16 Sep 2018 12:01):    No growth to date.    RADIOLOGY:  No new maging studies today     PHYSICAL EXAM:  GEN: No acute distress, lying comfortably in bed   LUNGS: Clear to auscultation bilaterally   HEART: S1/S2 present. Regular rate and rhythm   ABD: Soft, non-tender, non-distended. Bowel sounds present  EXT: Noncyanotic, nonedematous, 2+ peripheral pulses   NEURO: AAOX3    Indwelling Oates catheter 2/2 urinary obstruction

## 2018-09-17 NOTE — PROGRESS NOTE ADULT - ASSESSMENT
Timbo Jose is a 83M who was BIBA for fever, suprapubic discomfort, and dysuria.    #NISHANT on CKD, post-renal 2/2 obstructive uropathy (BPH), improving   - Cr 6.5 (9/14/18) -> 3.9 (9/16/18) -> 2.9 (9/17/18). Unclear baseline, but Cr of 1.4 on 9/11/18   - Anuria 2/2 obstruction relieved by coude cath insertion  - Renal US: mild bilateral hydronephrosis, enlarged prostate   - Monitor BMP   - I&Os (-935 over last 24 hrs)   - PSA level ordered   - Urology recs: keep catheter in for minimum of 2 weeks (from 9/14/18), decompressive hematuria is expected - irrigate PRN, Flomax 0.4mg qhs  - Nephro recs: no indication for dialysis at this time     #SIRS (fever, tachycardia), resolved   -Pt now afebrile, no leukocytosis   -UTI was suspected, pt started on meropenem 500 q24h -> urine cx negative  -Blood cx negative   -CXR (from nursing home) with left lung infiltrate   -ID on board, can switch to PO abx on d/c     #Mild Dementia  -Environmental control, donepezil 10mg PO qhs     #DM2  - FS and insulin if FS >180 	    #HTN  -Amlodipine 5mg PO daily   -Off ACE and Lasix    #DLD  -Atorvastatin 40mg PO qhs     #Normocytic Anemia  -Hgb 9.4, monitor CBC   -Ferrous sulfate 325mg PO daily     #Hyperkalemia, resolved    #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   CHG 4% bath qd and PRN

## 2018-09-17 NOTE — PROGRESS NOTE ADULT - SUBJECTIVE AND OBJECTIVE BOX
Chart reviewed, patient examined. Pertinent results reviewed.  Case discussed with HO  specialist f/u reviewed  HD#3; prior hospitalization reviewed as well    YAZAN WALKER  83y  Male      Patient is a 83y old  Male who presents with a chief complaint of Fever and oliguria (15 Sep 2018 11:07), noted to have     obstructive uropathy, with large amount of urine obtained when catheter placed; on de leon now; empiric meropenem started; fever last on 9/15    REVIEW OF SYSTEMS: pt with dementia-poor historian  CONSTITUTIONAL: No fever,   EYES: No eye pain, visual disturbances, or discharge  ENMT:  + poor hearing,   NECK: No pain or stiffness  BREASTS:nipple discharge  RESPIRATORY: No cough; No shortness of breath  CARDIOVASCULAR: No chest pain, palpitations, dizziness, or leg swelling  GASTROINTESTINAL: + abdominal pain P T Catheter-better;No diarrhea or constipation. No melena or hematochezia.  GENITOURINARY: poor output, then catheter here;  NEUROLOGICAL: dementia; just here for AMS-encephalopathy  SKIN: No itching, burning, rashes, or lesions   LYMPH NODES: No enlarged glands  ENDOCRINE: No heat or cold intolerance; No hair loss  MUSCULOSKELETAL: No joint pain or swelling; No muscle, back, or extremity pain  PSYCHIATRIC: No depression, anxiety, mood swings, or difficulty sleeping  HEME/LYMPH: No easy bruising, or bleeding gums  ALLERY AND IMMUNOLOGIC: No hives or eczema  FAMILY HISTORY:  No pertinent family history in first degree relatives  Vital Signs Last 24 Hrs  T(C): 36.9 (17 Sep 2018 04:40), Max: 37.1 (16 Sep 2018 13:51)  T(F): 98.5 (17 Sep 2018 04:40), Max: 98.8 (16 Sep 2018 13:51)  HR: 88 (17 Sep 2018 04:40) (84 - 99)  BP: 137/68 (17 Sep 2018 04:40) (108/58 - 175/90)  BP(mean): --  RR: 18 (17 Sep 2018 04:40) (18 - 18)  SpO2: --    No Known Allergies  urine out put >4000 ml-over prior 24 hrs(9/15-16); less last 24h    PHYSICAL EXAM:  GENERAL: NAD, well-groomed, well-developed; poor focus, Ox1  HEAD:  Atraumatic, Normocephalic  EYES: EOMI, PERRLA, conjunctiva and sclera clear; sev arcus senilis  ENMT: No tonsillar erythema, exudates, or enlargement;   NECK: Supple, No JVD, Normal thyroid  NERVOUS SYSTEM:  Alert & Oriented x1; gen weak; LE 2/5  CHEST/LUNG: Clear to percussion bilaterally; No rales, rhonchi, wheezing, or rubs  HEART: Regular rate and rhythm; No murmurs, rubs, or gallops  ABDOMEN: Soft, Nontender, Nondistended; Bowel sounds present  -de leon cath with blood tinged urine  EXTREMITIES:  2+ Peripheral Pulses, No clubbing, cyanosis, or edema  LYMPH: No lymphadenopathy noted  SKIN: No rashes or lesions      LABS:  09-15    136  |  99  |  46<H>  ----------------------------<  163<H>  4.8   |  19  |  5.3<HH>    Ca    8.4<L>      15 Sep 2018 11:12    TPro  7.2  /  Alb  3.4<L>  /  TBili  0.3  /  DBili  x   /  AST  25  /  ALT  11  /  AlkPhos  171<H>  09-14                          9.3    12.14 )-----------( 226      ( 15 Sep 2018 11:12 )             28.4     < from: CT Abdomen and Pelvis No Cont (09.15.18 @ 11:57) >  1. Marked BPH, with extensive median lobe hypertrophy; prostate gland   measures approximately 9.9 x 6.2 x 7.5 cm. Urinary bladder partially   distended with De Leon catheter. Diffuse urinary bladder wall thickening,   which may represent sequela of urinary outflow tract obstruction or is   secondary to underdistention.    < end of copied text >      RADIOLOGY & ADDITIONAL TESTS:    MEDICATION:  acetaminophen  Suppository .. 650 milliGRAM(s) Rectal every 6 hours PRN  amLODIPine   Tablet 5 milliGRAM(s) Oral daily  aspirin enteric coated 81 milliGRAM(s) Oral daily  atorvastatin 40 milliGRAM(s) Oral at bedtime  donepezil 10 milliGRAM(s) Oral at bedtime  ferrous    sulfate 325 milliGRAM(s) Oral daily  heparin  Injectable 5000 Unit(s) SubCutaneous every 8 hours  influenza   Vaccine 0.5 milliLiter(s) IntraMuscular once  memantine 10 milliGRAM(s) Oral two times a day  meropenem  IVPB 500 milliGRAM(s) IV Intermittent every 24 hours  sodium chloride 0.9%. 1000 milliLiter(s) IV Continuous <Continuous>  tamsulosin 0.4 milliGRAM(s) Oral at bedtime      HEALTH ISSUES - PROBLEM Dx:  NISHANT due to obstructive uropathy on de leon,flomax      specialist f/u reviewed- and renal; maintain Is=Os;   Dementia on donepezil  Uti ,on meropenem; go to q12h with improved renal F(x)  HTn on amlodipine  anemia on feso4,  rehab- reconsult to set up for return to SNF   s/p hyperkalemia now is normal

## 2018-09-17 NOTE — PROGRESS NOTE ADULT - ASSESSMENT
NISHANT /HAGMA/ hyperkalemia:  # multifactorial : obstruction with prerenal  # creatinine better non oliguric  # keep de leon in  # off IVF   # follow repeat BMP   # check UC  # d/c feso4  # renal sono checked still with mild hydro  # check ph level  # no acute indication for RRT  # keep on FLomax for now   # Overall prognosis poor NISHANT /HAGMA/ hyperkalemia:  # multifactorial : obstruction with prerenal  # creatinine better non oliguric  # keep de leon in  # off IVF , resume if poor po intake   # follow repeat BMP   # check UC  #keep on Merrem   # renal sono checked still with mild hydro  # check ph level  # no acute indication for RRT  # keep on FLomax for now   # Overall prognosis poor

## 2018-09-17 NOTE — PROGRESS NOTE ADULT - SUBJECTIVE AND OBJECTIVE BOX
Nephrology progress note    Patient is seen and examined, events over the last 24 h noted .    Allergies:  No Known Allergies    Hospital Medications:   MEDICATIONS  (STANDING):  amLODIPine   Tablet 5 milliGRAM(s) Oral daily  aspirin enteric coated 81 milliGRAM(s) Oral daily  atorvastatin 40 milliGRAM(s) Oral at bedtime  donepezil 10 milliGRAM(s) Oral at bedtime  ferrous    sulfate 325 milliGRAM(s) Oral daily  heparin  Injectable 5000 Unit(s) SubCutaneous every 8 hours  influenza   Vaccine 0.5 milliLiter(s) IntraMuscular once  memantine 10 milliGRAM(s) Oral two times a day  meropenem  IVPB 500 milliGRAM(s) IV Intermittent every 24 hours  tamsulosin 0.4 milliGRAM(s) Oral at bedtime        VITALS:  T(F): 98.5 (18 @ 04:40), Max: 98.8 (18 @ 13:51)  HR: 88 (18 @ 04:40)  BP: 137/68 (18 @ 04:40)  RR: 18 (18 @ 04:40)      09-15 @ 07:  -   @ 07:00  --------------------------------------------------------  IN: 725 mL / OUT: 1850 mL / NET: -1125 mL     @ 07:  -   @ 07:00  --------------------------------------------------------  IN: 1015 mL / OUT: 1750 mL / NET: -735 mL     @ 07:01  -   @ 12:07  --------------------------------------------------------  IN: 350 mL / OUT: 0 mL / NET: 350 mL          PHYSICAL EXAM:  Constitutional: NAD  HEENT: anicteric sclera, oropharynx clear, MMM  Neck: No JVD  Respiratory: CTAB, no wheezes, rales or rhonchi  Cardiovascular: S1, S2, RRR  Gastrointestinal: BS+, soft, NT/ND  Extremities: No cyanosis or clubbing. No peripheral edema  :  No de leon.   Skin: No rashes    LABS:      143  |  106  |  41<H>  ----------------------------<  133<H>  5.0   |  23  |  2.9<H>  Creatinine Trend: 2.9<--, 3.9<--, 5.3<--, 6.5<--, 1.2<--, 1.2<--  Ca    8.9      17 Sep 2018 06:46                            9.4    8.75  )-----------( 261      ( 17 Sep 2018 06:46 )             29.1       Urine Studies:  Urinalysis Basic - ( 15 Sep 2018 00:03 )    Color: Dark Yellow / Appearance: Cloudy / S.015 / pH:   Gluc:  / Ketone: Negative  / Bili: Negative / Urobili: 0.2 mg/dL   Blood:  / Protein: Trace mg/dL / Nitrite: Negative   Leuk Esterase: Negative / RBC: >50 /HPF / WBC    Sq Epi:  / Non Sq Epi: Occasional /HPF / Bacteria: occ /HPF        RADIOLOGY & ADDITIONAL STUDIES:  < from: US Renal (18 @ 10:21) >  IMPRESSION:  Mild bilateral hydronephrosis, correlating with CT.  Enlarged prostate with estimated volume 170 cc.  Urinary bladder nondistended with De Leon catheter.    < end of copied text > Nephrology progress note    Patient is seen and examined, events over the last 24 h noted .  No complaints   No events   still on Merrem     Allergies:  No Known Allergies    Hospital Medications:   MEDICATIONS  (STANDING):  amLODIPine   Tablet 5 milliGRAM(s) Oral daily  aspirin enteric coated 81 milliGRAM(s) Oral daily  atorvastatin 40 milliGRAM(s) Oral at bedtime  donepezil 10 milliGRAM(s) Oral at bedtime  ferrous    sulfate 325 milliGRAM(s) Oral daily  heparin  Injectable 5000 Unit(s) SubCutaneous every 8 hours  influenza   Vaccine 0.5 milliLiter(s) IntraMuscular once  memantine 10 milliGRAM(s) Oral two times a day  meropenem  IVPB 500 milliGRAM(s) IV Intermittent every 24 hours  tamsulosin 0.4 milliGRAM(s) Oral at bedtime        VITALS:  T(F): 98.5 (18 @ 04:40), Max: 98.8 (18 @ 13:51)  HR: 88 (18 @ 04:40)  BP: 137/68 (18 @ 04:40)  RR: 18 (18 @ 04:40)      09-15 @ 07:01  -   @ 07:00  --------------------------------------------------------  IN: 725 mL / OUT: 1850 mL / NET: -1125 mL     @ 07:01  -   @ 07:00  --------------------------------------------------------  IN: 1015 mL / OUT: 1750 mL / NET: -735 mL     @ 07:01  -   @ 12:07  --------------------------------------------------------  IN: 350 mL / OUT: 0 mL / NET: 350 mL          PHYSICAL EXAM:  Constitutional: lethargic   HEENT: anicteric sclera, oropharynx clear, MMM  Neck: No JVD  Respiratory: CTAB, no wheezes, rales or rhonchi  Cardiovascular: S1, S2, RRR  Gastrointestinal: BS+, soft, NT/ND  Extremities: No cyanosis or clubbing. No peripheral edema  :  No de leon.   Skin: No rashes    LABS:      143  |  106  |  41<H>  ----------------------------<  133<H>  5.0   |  23  |  2.9<H>    Creatinine Trend: 2.9<--, 3.9<--, 5.3<--, 6.5<--, 1.2<--, 1.2<--    Ca    8.9      17 Sep 2018 06:46                            9.4    8.75  )-----------( 261      ( 17 Sep 2018 06:46 )             29.1       Urine Studies:  Urinalysis Basic - ( 15 Sep 2018 00:03 )    Color: Dark Yellow / Appearance: Cloudy / S.015 / pH:   Gluc:  / Ketone: Negative  / Bili: Negative / Urobili: 0.2 mg/dL   Blood:  / Protein: Trace mg/dL / Nitrite: Negative   Leuk Esterase: Negative / RBC: >50 /HPF / WBC    Sq Epi:  / Non Sq Epi: Occasional /HPF / Bacteria: occ /HPF        RADIOLOGY & ADDITIONAL STUDIES:  < from: US Renal (18 @ 10:21) >  IMPRESSION:  Mild bilateral hydronephrosis, correlating with CT.  Enlarged prostate with estimated volume 170 cc.  Urinary bladder nondistended with De Leon catheter.    < end of copied text >

## 2018-09-17 NOTE — PROGRESS NOTE ADULT - SUBJECTIVE AND OBJECTIVE BOX
YAZAN WALKER  83y, Male      OVERNIGHT EVENTS:    no fevers, weak, no compliants.    VITALS:  T(F): 98.5, Max: 98.8 (09-16-18 @ 13:51)  HR: 88  BP: 137/68  RR: 18Vital Signs Last 24 Hrs  T(C): 36.9 (17 Sep 2018 04:40), Max: 37.1 (16 Sep 2018 13:51)  T(F): 98.5 (17 Sep 2018 04:40), Max: 98.8 (16 Sep 2018 13:51)  HR: 88 (17 Sep 2018 04:40) (84 - 99)  BP: 137/68 (17 Sep 2018 04:40) (108/58 - 175/90)  BP(mean): --  RR: 18 (17 Sep 2018 04:40) (18 - 18)  SpO2: --    TESTS & MEASUREMENTS:                        9.4    8.75  )-----------( 261      ( 17 Sep 2018 06:46 )             29.1     09-17    143  |  106  |  41<H>  ----------------------------<  133<H>  5.0   |  23  |  2.9<H>    Ca    8.9      17 Sep 2018 06:46          Culture - Urine (collected 09-15-18 @ 00:03)  Source: .Urine Clean Catch (Midstream)  Final Report (09-16-18 @ 14:22):    No growth    Culture - Blood (collected 09-14-18 @ 23:30)  Source: .Blood Blood-Peripheral  Preliminary Report (09-16-18 @ 12:01):    No growth to date.            RADIOLOGY & ADDITIONAL TESTS:    ANTIBIOTICS:  meropenem  IVPB 500 milliGRAM(s) IV Intermittent every 24 hours

## 2018-09-18 LAB
ALBUMIN SERPL ELPH-MCNC: 3.1 G/DL — LOW (ref 3.5–5.2)
ALP SERPL-CCNC: 137 U/L — HIGH (ref 30–115)
ALT FLD-CCNC: 15 U/L — SIGNIFICANT CHANGE UP (ref 0–41)
ANION GAP SERPL CALC-SCNC: 14 MMOL/L — SIGNIFICANT CHANGE UP (ref 7–14)
AST SERPL-CCNC: 32 U/L — SIGNIFICANT CHANGE UP (ref 0–41)
BASOPHILS # BLD AUTO: 0.02 K/UL — SIGNIFICANT CHANGE UP (ref 0–0.2)
BASOPHILS NFR BLD AUTO: 0.2 % — SIGNIFICANT CHANGE UP (ref 0–1)
BILIRUB SERPL-MCNC: 0.3 MG/DL — SIGNIFICANT CHANGE UP (ref 0.2–1.2)
BLD GP AB SCN SERPL QL: SIGNIFICANT CHANGE UP
BUN SERPL-MCNC: 33 MG/DL — HIGH (ref 10–20)
CALCIUM SERPL-MCNC: 9.3 MG/DL — SIGNIFICANT CHANGE UP (ref 8.5–10.1)
CHLORIDE SERPL-SCNC: 107 MMOL/L — SIGNIFICANT CHANGE UP (ref 98–110)
CO2 SERPL-SCNC: 23 MMOL/L — SIGNIFICANT CHANGE UP (ref 17–32)
CREAT SERPL-MCNC: 2.3 MG/DL — HIGH (ref 0.7–1.5)
EOSINOPHIL # BLD AUTO: 0.06 K/UL — SIGNIFICANT CHANGE UP (ref 0–0.7)
EOSINOPHIL NFR BLD AUTO: 0.7 % — SIGNIFICANT CHANGE UP (ref 0–8)
FERRITIN SERPL-MCNC: 441 NG/ML — HIGH (ref 30–400)
GLUCOSE BLDC GLUCOMTR-MCNC: 138 MG/DL — HIGH (ref 70–99)
GLUCOSE BLDC GLUCOMTR-MCNC: 153 MG/DL — HIGH (ref 70–99)
GLUCOSE BLDC GLUCOMTR-MCNC: 173 MG/DL — HIGH (ref 70–99)
GLUCOSE BLDC GLUCOMTR-MCNC: 184 MG/DL — HIGH (ref 70–99)
GLUCOSE SERPL-MCNC: 132 MG/DL — HIGH (ref 70–99)
GRAM STN FLD: SIGNIFICANT CHANGE UP
HCT VFR BLD CALC: 31.2 % — LOW (ref 42–52)
HGB BLD-MCNC: 10.1 G/DL — LOW (ref 14–18)
IMM GRANULOCYTES NFR BLD AUTO: 0.4 % — HIGH (ref 0.1–0.3)
LYMPHOCYTES # BLD AUTO: 1.39 K/UL — SIGNIFICANT CHANGE UP (ref 1.2–3.4)
LYMPHOCYTES # BLD AUTO: 15.2 % — LOW (ref 20.5–51.1)
MAGNESIUM SERPL-MCNC: 1.8 MG/DL — SIGNIFICANT CHANGE UP (ref 1.8–2.4)
MCHC RBC-ENTMCNC: 28.2 PG — SIGNIFICANT CHANGE UP (ref 27–31)
MCHC RBC-ENTMCNC: 32.4 G/DL — SIGNIFICANT CHANGE UP (ref 32–37)
MCV RBC AUTO: 87.2 FL — SIGNIFICANT CHANGE UP (ref 80–94)
MONOCYTES # BLD AUTO: 0.86 K/UL — HIGH (ref 0.1–0.6)
MONOCYTES NFR BLD AUTO: 9.4 % — HIGH (ref 1.7–9.3)
NEUTROPHILS # BLD AUTO: 6.8 K/UL — HIGH (ref 1.4–6.5)
NEUTROPHILS NFR BLD AUTO: 74.1 % — SIGNIFICANT CHANGE UP (ref 42.2–75.2)
NRBC # BLD: 0 /100 WBCS — SIGNIFICANT CHANGE UP (ref 0–0)
PLATELET # BLD AUTO: 333 K/UL — SIGNIFICANT CHANGE UP (ref 130–400)
POTASSIUM SERPL-MCNC: 4.9 MMOL/L — SIGNIFICANT CHANGE UP (ref 3.5–5)
POTASSIUM SERPL-SCNC: 4.9 MMOL/L — SIGNIFICANT CHANGE UP (ref 3.5–5)
PROT SERPL-MCNC: 6.3 G/DL — SIGNIFICANT CHANGE UP (ref 6–8)
PSA FLD-MCNC: 32.53 NG/ML — HIGH (ref 0–4)
RBC # BLD: 3.58 M/UL — LOW (ref 4.7–6.1)
RBC # FLD: 11.8 % — SIGNIFICANT CHANGE UP (ref 11.5–14.5)
SODIUM SERPL-SCNC: 144 MMOL/L — SIGNIFICANT CHANGE UP (ref 135–146)
TYPE + AB SCN PNL BLD: SIGNIFICANT CHANGE UP
WBC # BLD: 9.17 K/UL — SIGNIFICANT CHANGE UP (ref 4.8–10.8)
WBC # FLD AUTO: 9.17 K/UL — SIGNIFICANT CHANGE UP (ref 4.8–10.8)

## 2018-09-18 RX ADMIN — MEMANTINE HYDROCHLORIDE 10 MILLIGRAM(S): 10 TABLET ORAL at 05:28

## 2018-09-18 RX ADMIN — Medication 325 MILLIGRAM(S): at 11:13

## 2018-09-18 RX ADMIN — INFLUENZA VIRUS VACCINE 0.5 MILLILITER(S): 15; 15; 15; 15 SUSPENSION INTRAMUSCULAR at 13:06

## 2018-09-18 RX ADMIN — MEROPENEM 100 MILLIGRAM(S): 1 INJECTION INTRAVENOUS at 05:27

## 2018-09-18 RX ADMIN — Medication 81 MILLIGRAM(S): at 11:13

## 2018-09-18 RX ADMIN — MEMANTINE HYDROCHLORIDE 10 MILLIGRAM(S): 10 TABLET ORAL at 17:04

## 2018-09-18 RX ADMIN — HEPARIN SODIUM 5000 UNIT(S): 5000 INJECTION INTRAVENOUS; SUBCUTANEOUS at 21:53

## 2018-09-18 RX ADMIN — DONEPEZIL HYDROCHLORIDE 10 MILLIGRAM(S): 10 TABLET, FILM COATED ORAL at 21:52

## 2018-09-18 RX ADMIN — AMLODIPINE BESYLATE 5 MILLIGRAM(S): 2.5 TABLET ORAL at 05:28

## 2018-09-18 RX ADMIN — TAMSULOSIN HYDROCHLORIDE 0.4 MILLIGRAM(S): 0.4 CAPSULE ORAL at 21:51

## 2018-09-18 RX ADMIN — ATORVASTATIN CALCIUM 40 MILLIGRAM(S): 80 TABLET, FILM COATED ORAL at 21:52

## 2018-09-18 RX ADMIN — HEPARIN SODIUM 5000 UNIT(S): 5000 INJECTION INTRAVENOUS; SUBCUTANEOUS at 05:28

## 2018-09-18 RX ADMIN — HEPARIN SODIUM 5000 UNIT(S): 5000 INJECTION INTRAVENOUS; SUBCUTANEOUS at 13:04

## 2018-09-18 NOTE — PROGRESS NOTE ADULT - ASSESSMENT
NISHANT /HAGMA/ hyperkalemia:  # multifactorial : obstruction with prerenal  # creatinine improving   # non oliguric  # keep de leon in  # BP at goal   # d/c feso4 in view of elevated sat  # ct noted : please repeat MILES haro f/up   # check ph level  # ID notes appreciated, increase juan carlos to 500 q 12  #will follow

## 2018-09-18 NOTE — PROGRESS NOTE ADULT - SUBJECTIVE AND OBJECTIVE BOX
seen and examined  no distress  lying comfortable     Standing Inpatient Medications  amLODIPine   Tablet 5 milliGRAM(s) Oral daily  aspirin enteric coated 81 milliGRAM(s) Oral daily  atorvastatin 40 milliGRAM(s) Oral at bedtime  donepezil 10 milliGRAM(s) Oral at bedtime  ferrous    sulfate 325 milliGRAM(s) Oral daily  heparin  Injectable 5000 Unit(s) SubCutaneous every 8 hours  influenza   Vaccine 0.5 milliLiter(s) IntraMuscular once  memantine 10 milliGRAM(s) Oral two times a day  meropenem  IVPB 500 milliGRAM(s) IV Intermittent every 24 hours  tamsulosin 0.4 milliGRAM(s) Oral at bedtime          VITALS/PHYSICAL EXAM  --------------------------------------------------------------------------------  T(C): 36.6 (09-18-18 @ 04:33), Max: 37.1 (09-17-18 @ 20:11)  HR: 89 (09-18-18 @ 04:33) (89 - 91)  BP: 128/67 (09-18-18 @ 04:33) (128/67 - 140/66)  RR: 18 (09-18-18 @ 04:33) (16 - 18)  SpO2: --  Wt(kg): --        09-17-18 @ 07:01  -  09-18-18 @ 07:00  --------------------------------------------------------  IN: 670 mL / OUT: 1500 mL / NET: -830 mL      Physical Exam:  	Gen: NAD  	Pulm: decrease BS  B/L  	CV: S1S2; no rub  	Abd: +distended  	: de leon, hematuria   	LE:  no edema        LABS/STUDIES  --------------------------------------------------------------------------------              9.4    8.75  >-----------<  261      [09-17-18 @ 06:46]              29.1     143  |  106  |  41  ----------------------------<  133      [09-17-18 @ 06:46]  5.0   |  23  |  2.9        Ca     8.9     [09-17-18 @ 06:46]            Creatinine Trend:  SCr 2.9 [09-17 @ 06:46]  SCr 3.9 [09-16 @ 07:31]  SCr 5.3 [09-15 @ 11:12]  SCr 6.5 [09-14 @ 23:30]  SCr 1.2 [09-10 @ 07:55]    Urinalysis - [09-15-18 @ 00:03]      Color Dark Yellow / Appearance Cloudy / SG 1.015 / pH 5.5      Gluc Negative / Ketone Negative  / Bili Negative / Urobili 0.2       Blood Large / Protein Trace / Leuk Est Negative / Nitrite Negative      RBC >50 / WBC  / Hyaline  / Gran  / Sq Epi  / Non Sq Epi Occasional / Bacteria occ      Iron 46, TIBC 146, %sat 32      [09-17-18 @ 10:50]  Ferritin 441      [09-17-18 @ 10:50]  TSH 1.21      [09-08-18 @ 11:24]      Syphilis Screen (Treponema Pallidum Ab) Negative      [09-08-18 @ 11:24] seen and examined  no distress  lying comfortable     Standing Inpatient Medications  amLODIPine   Tablet 5 milliGRAM(s) Oral daily  aspirin enteric coated 81 milliGRAM(s) Oral daily  atorvastatin 40 milliGRAM(s) Oral at bedtime  donepezil 10 milliGRAM(s) Oral at bedtime  ferrous    sulfate 325 milliGRAM(s) Oral daily  heparin  Injectable 5000 Unit(s) SubCutaneous every 8 hours  influenza   Vaccine 0.5 milliLiter(s) IntraMuscular once  memantine 10 milliGRAM(s) Oral two times a day  meropenem  IVPB 500 milliGRAM(s) IV Intermittent every 24 hours  tamsulosin 0.4 milliGRAM(s) Oral at bedtime          VITALS/PHYSICAL EXAM  --------------------------------------------------------------------------------  T(C): 36.6 (09-18-18 @ 04:33), Max: 37.1 (09-17-18 @ 20:11)  HR: 89 (09-18-18 @ 04:33) (89 - 91)  BP: 128/67 (09-18-18 @ 04:33) (128/67 - 140/66)  RR: 18 (09-18-18 @ 04:33) (16 - 18)  SpO2: --  Wt(kg): --        09-17-18 @ 07:01  -  09-18-18 @ 07:00  --------------------------------------------------------  IN: 670 mL / OUT: 1500 mL / NET: -830 mL      Physical Exam:  	Gen: NAD  	Pulm: decrease BS  B/L  	CV: S1S2; no rub  	Abd: +distended  	: de leon, hematuria   	LE:  no edema        LABS/STUDIES  --------------------------------------------------------------------------------              9.4    8.75  >-----------<  261      [09-17-18 @ 06:46]              29.1     143  |  106  |  41  ----------------------------<  133      [09-17-18 @ 06:46]  5.0   |  23  |  2.9        Ca     8.9     [09-17-18 @ 06:46]            Creatinine Trend:  SCr 2.9 [09-17 @ 06:46]  SCr 3.9 [09-16 @ 07:31]  SCr 5.3 [09-15 @ 11:12]  SCr 6.5 [09-14 @ 23:30]  SCr 1.2 [09-10 @ 07:55]    Urinalysis - [09-15-18 @ 00:03]      Color Dark Yellow / Appearance Cloudy / SG 1.015 / pH 5.5      Gluc Negative / Ketone Negative  / Bili Negative / Urobili 0.2       Blood Large / Protein Trace / Leuk Est Negative / Nitrite Negative      RBC >50 / WBC  / Hyaline  / Gran  / Sq Epi  / Non Sq Epi Occasional / Bacteria occ      Iron 46, TIBC 146, %sat 32      [09-17-18 @ 10:50]  Ferritin 441      [09-17-18 @ 10:50]  TSH 1.21      [09-08-18 @ 11:24]      Syphilis Screen (Treponema Pallidum Ab) Negative      [09-08-18 @ 11:24]  < from: CT Abdomen and Pelvis No Cont (09.15.18 @ 11:57) >  1. Marked BPH, with extensive median lobe hypertrophy; prostate gland   measures approximately 9.9 x 6.2 x 7.5 cm. Urinary bladder partially   distended with De Leon catheter. Diffuse urinary bladder wall thickening,   which may represent sequela of urinary outflow tract obstruction or is   secondary to underdistention.    2. Mild bilateral renal hydroureteronephrosis, which may represent   sequela of urinary outflow tract obstruction.      < end of copied text >

## 2018-09-18 NOTE — PROGRESS NOTE ADULT - ASSESSMENT
IMPRESSION:  No pyuria.  CT with BPH. No pyelo.  Hematuria.  Culture are negative to date.  See no other focus of infection besides  tract.  Blood/ urine cultures are negative.    RECOMMENDATIONS:  D/C Meropenem .  Observe off antibiotics

## 2018-09-18 NOTE — PHYSICAL THERAPY INITIAL EVALUATION ADULT - LIVES WITH, PROFILE
pt is poor historian, obtained from chart that pt is resident at [facility].

## 2018-09-18 NOTE — PROGRESS NOTE ADULT - SUBJECTIVE AND OBJECTIVE BOX
YAZAN WALKER  83y  Male  HPI:  anemia, dementia, DLD, HTN, CKD, BPH, DM2 presented for fever and lower abdominal pain for 1 day duration.  Patient is a resident at [facility], was discharged on 9/11 from our hospital after being treated for NISHANT 2/2 dehydration and medications. He was sent in for fever Tmax 100.5, with suprapubic pain and dysuria. Patient is a poor historian and hx was provided partly by NH chart. Stat labs and a CXR were obtained in NH which showed a rise in Cr from 1.4 on 9/11 to 5.5 and he had no urine output, and CXR showed L lung infiltrate vs effusion so was BIBEMS for evaluation. In ED coude catheter was inserted and he had 1700 cc output of maroon urine. Patient felt instant relief of abdominal pain. He had no hypotension, syncope, back pain, chest pain, palpitations.    Tried to contact Daughter many times could not get through. (15 Sep 2018 03:05)    MEDICATIONS  (STANDING):  amLODIPine   Tablet 5 milliGRAM(s) Oral daily  aspirin enteric coated 81 milliGRAM(s) Oral daily  atorvastatin 40 milliGRAM(s) Oral at bedtime  donepezil 10 milliGRAM(s) Oral at bedtime  ferrous    sulfate 325 milliGRAM(s) Oral daily  heparin  Injectable 5000 Unit(s) SubCutaneous every 8 hours  influenza   Vaccine 0.5 milliLiter(s) IntraMuscular once  memantine 10 milliGRAM(s) Oral two times a day  tamsulosin 0.4 milliGRAM(s) Oral at bedtime    MEDICATIONS  (PRN):  acetaminophen  Suppository .. 650 milliGRAM(s) Rectal every 6 hours PRN Temp greater or equal to 38.5C (101.3F)    INTERVAL EVENTS: Patient seen today without distress, denies pain, de leon in place, scant hematuria. Case discussed with geri.    T(C): 36.6 (09-18-18 @ 04:33), Max: 37.1 (09-17-18 @ 20:11)  HR: 89 (09-18-18 @ 04:33) (89 - 91)  BP: 128/67 (09-18-18 @ 04:33) (128/67 - 140/66)  RR: 18 (09-18-18 @ 04:33) (16 - 18)  SpO2: --  Wt(kg): --Vital Signs Last 24 Hrs  T(C): 36.6 (18 Sep 2018 04:33), Max: 37.1 (17 Sep 2018 20:11)  T(F): 97.9 (18 Sep 2018 04:33), Max: 98.8 (17 Sep 2018 20:11)  HR: 89 (18 Sep 2018 04:33) (89 - 91)  BP: 128/67 (18 Sep 2018 04:33) (128/67 - 140/66)  BP(mean): --  RR: 18 (18 Sep 2018 04:33) (16 - 18)  SpO2: --    PHYSICAL EXAM:  GENERAL: NAD  NECK: Supple, No JVD  CHEST/LUNG: Clear? shallow resp  HEART: S1, S2, Regular rate and rhythm;   ABDOMEN: Soft, Nontender, Nondistended; Bowel sounds present  EXTREMITIES: Trace edema  SKIN: No rashes or lesions    LABS:  Labs:                        10.1   9.17  )-----------( 333      ( 18 Sep 2018 06:39 )             31.2             09-18    144  |  107  |  33<H>  ----------------------------<  132<H>  4.9   |  23  |  2.3<H>    Ca    9.3      18 Sep 2018 06:39  Mg     1.8     09-18    TPro  6.3  /  Alb  3.1<L>  /  TBili  0.3  /  DBili  x   /  AST  32  /  ALT  15  /  AlkPhos  137<H>  09-18    LIVER FUNCTIONS - ( 18 Sep 2018 06:39 )  Alb: 3.1 g/dL / Pro: 6.3 g/dL / ALK PHOS: 137 U/L / ALT: 15 U/L / AST: 32 U/L / GGT: x                         RADIOLOGY & ADDITIONAL TESTS:  < from: US Renal (09.16.18 @ 10:21) >  IMPRESSION:  Mild bilateral hydronephrosis, correlating with CT.  Enlarged prostate with estimated volume 170 cc.  Urinary bladder nondistended with De Leon catheter.      < end of copied text >

## 2018-09-18 NOTE — PROGRESS NOTE ADULT - SUBJECTIVE AND OBJECTIVE BOX
YAZAN WALKER  83y, Male      OVERNIGHT EVENTS:    no fevers, NAD .  No complaints.    VITALS:  T(F): 97.9, Max: 98.8 (09-17-18 @ 20:11)  HR: 89  BP: 128/67  RR: 18Vital Signs Last 24 Hrs  T(C): 36.6 (18 Sep 2018 04:33), Max: 37.1 (17 Sep 2018 20:11)  T(F): 97.9 (18 Sep 2018 04:33), Max: 98.8 (17 Sep 2018 20:11)  HR: 89 (18 Sep 2018 04:33) (89 - 91)  BP: 128/67 (18 Sep 2018 04:33) (128/67 - 140/66)  BP(mean): --  RR: 18 (18 Sep 2018 04:33) (16 - 18)  SpO2: --    TESTS & MEASUREMENTS:                        10.1   9.17  )-----------( 333      ( 18 Sep 2018 06:39 )             31.2     09-17    143  |  106  |  41<H>  ----------------------------<  133<H>  5.0   |  23  |  2.9<H>    Ca    8.9      17 Sep 2018 06:46          Culture - Urine (collected 09-15-18 @ 00:03)  Source: .Urine Clean Catch (Midstream)  Final Report (09-16-18 @ 14:22):    No growth    Culture - Blood (collected 09-14-18 @ 23:30)  Source: .Blood Blood-Peripheral  Preliminary Report (09-16-18 @ 12:01):    No growth to date.            RADIOLOGY & ADDITIONAL TESTS:    ANTIBIOTICS:

## 2018-09-18 NOTE — PROGRESS NOTE ADULT - SUBJECTIVE AND OBJECTIVE BOX
SUBJECTIVE:    Patient is a 83y old Male who presents with a chief complaint of Fever and oliguria (18 Sep 2018 11:26)    Currently admitted to medicine with the primary diagnosis of Renal failure     Today is hospital day 3d. No acute events overnight. Patient's Oates catheter filled with hematuria this AM - irrigation PRN (per urology recs). Patient denies any headache, chest pain, dyspnea, abdominal pain, N/V/D/C at this time. Per urology, patient can follow up outpatient for BPH and urinary obstruction. Oates catheter will remain in for minimum of 2 weeks.     PAST MEDICAL & SURGICAL HISTORY  Dementia  Kidney disease  DM (diabetes mellitus)  HTN (hypertension)  H/O knee surgery: right knee    SOCIAL HISTORY:  Negative for smoking/alcohol/drug use.     ALLERGIES:  No Known Allergies    MEDICATIONS:  STANDING MEDICATIONS  amLODIPine   Tablet 5 milliGRAM(s) Oral daily  aspirin enteric coated 81 milliGRAM(s) Oral daily  atorvastatin 40 milliGRAM(s) Oral at bedtime  donepezil 10 milliGRAM(s) Oral at bedtime  heparin  Injectable 5000 Unit(s) SubCutaneous every 8 hours  memantine 10 milliGRAM(s) Oral two times a day  tamsulosin 0.4 milliGRAM(s) Oral at bedtime    PRN MEDICATIONS  acetaminophen  Suppository .. 650 milliGRAM(s) Rectal every 6 hours PRN    VITALS:   T(F): 97.1  HR: 95  BP: 112/59  RR: 18  SpO2: --    LABS:                        10.1   9.17  )-----------( 333      ( 18 Sep 2018 06:39 )             31.2     09-18    144  |  107  |  33<H>  ----------------------------<  132<H>  4.9   |  23  |  2.3<H>    Ca    9.3      18 Sep 2018 06:39  Mg     1.8     09-18    TPro  6.3  /  Alb  3.1<L>  /  TBili  0.3  /  DBili  x   /  AST  32  /  ALT  15  /  AlkPhos  137<H>  09-18    RADIOLOGY:  No new imaging studies today     PHYSICAL EXAM:  GEN: No acute distress, lying comfortably in bed   LUNGS: Clear to auscultation bilaterally   HEART: S1/S2 present. RRR.   ABD: Soft, non-tender, non-distended. Bowel sounds present  EXT: Noncyanotic, nonedematous, 2+ peripheral pulses   NEURO: AAOX3    Indwelling Oates catheter for urinary obstruction

## 2018-09-18 NOTE — PROGRESS NOTE ADULT - ASSESSMENT
Timbo Jose is a 83M who was BIBA for fever, suprapubic discomfort, and dysuria.    NISHANT on CKD, post-renal 2/2 obstructive uropathy (BPH), improving   - Cr 6.5 (9/14/18) -> 3.9 (9/16/18) -> 2.9 (9/17/18) -> 2.3 today  - maintain de leon catheter  - Renal sono: mild bilateral hydronephrosis, enlarged prostate   - Monitor BMP   - PSA level 34  - Urology recs: keep catheter in for minimum of 2 weeks (from 9/14/18), decompressive hematuria is expected - irrigate PRN, Flomax 0.4mg qhs  - Nephro recs: no indication for dialysis at this time     SIRS (fever, tachycardia), resolved   - afebrile, no leukocytosis   - UTI was suspected, pt started on meropenem 500 q24h -> urine cx negative  - Blood cx negative   - ID f/u noted, antibiotics discontinued    Dementia  - Donepezil 10mg PO qhs     DM2  - FS and insulin if FS >180 	    HTN  - Amlodipine 5mg PO daily   - Off ACE and Lasix    DLD  - Atorvastatin 40mg PO qhs     Normocytic Anemia  - Hgb 9.4, monitor CBC   - Ferrous sulfate 325mg PO daily      Hyperkalemia, resolved    Evidence of Possible Paget's Disease on CT scan  - ALK phos     DVT PPX: Heparin SQ  Ambulate: OOBTC  Diet: Renal, DASH TLC  DNR/DNI (MOLST in chart), from [facility] , Discharge planning if remains stable

## 2018-09-18 NOTE — PHYSICAL THERAPY INITIAL EVALUATION ADULT - CRITERIA FOR SKILLED THERAPEUTIC INTERVENTIONS
rehab potential/therapy frequency/risk reduction/prevention/predicted duration of therapy intervention/functional limitations in following categories/impairments found

## 2018-09-18 NOTE — PHYSICAL THERAPY INITIAL EVALUATION ADULT - GENERAL OBSERVATIONS, REHAB EVAL
9:05-9:25. Pt encountered semifowler in bed in NAD, + de leon + borolo B LE's , agreeable to PT. Upon PT Eval, it was noticed pt has BM and needed hygenic care. Jules NANCE notified. Will f/u with PT at later time.
9:45-10:15. Pt encountered semifowler in bed in NAD, + de leon, no complaints, agreeable to PT.

## 2018-09-18 NOTE — PROGRESS NOTE ADULT - ASSESSMENT
Timbo Jose is a 83M who was BIBA for fever, suprapubic discomfort, and dysuria.    #NISHANT on CKD, post-renal 2/2 obstructive uropathy (BPH), improving   - Cr 6.5 -> 3.9 -> 2.9 -> 2.3. Unclear baseline, but Cr of 1.4 on 9/11/18   - Anuria 2/2 obstruction relieved by coude cath insertion  - Renal US: mild bilateral hydronephrosis, enlarged prostate   - Monitor BMP   - I&Os (-830 over last 24 hrs)   - PSA elevated at 32   - Urology recs: keep catheter in for minimum of 2 weeks (from 9/14/18), decompressive hematuria is expected - irrigate PRN, Flomax 0.4mg qhs, follow up outpatient for BPH/urodynamic studies   - Nephro recs: d/c iron, check pH   -Repeat Renal US ordered     #SIRS (fever, tachycardia), resolved   -Pt now afebrile, no leukocytosis   -Urine cx negative, blood cx negative, meropenem discontinued     #CT Evidence of Paget's Disease  -ALK Phos 137, asymptomatic     #Mild Dementia  -Environmental control, donepezil 10mg PO qhs, memantine 10mg BID     #DM2  - FS and insulin if FS >180 	    #HTN  -Amlodipine 5mg PO daily   -Off ACE and Lasix    #DLD  -Atorvastatin 40mg PO qhs, aspirin 81mg daily     #Normocytic Anemia  -Hgb 10.1, monitor CBC     DVT PPX: HSQ  Ambulate: OOBTC  Diet: Renal, DASH TLC  DNR/DNI (MOLST in chart), from [facility]   Dispo: STR in SNF   CHG 4% bath qd and PRN

## 2018-09-19 ENCOUNTER — TRANSCRIPTION ENCOUNTER (OUTPATIENT)
Age: 83
End: 2018-09-19

## 2018-09-19 LAB
ANION GAP SERPL CALC-SCNC: 18 MMOL/L — HIGH (ref 7–14)
ANION GAP SERPL CALC-SCNC: 18 MMOL/L — HIGH (ref 7–14)
BASOPHILS # BLD AUTO: 0.03 K/UL — SIGNIFICANT CHANGE UP (ref 0–0.2)
BASOPHILS NFR BLD AUTO: 0.3 % — SIGNIFICANT CHANGE UP (ref 0–1)
BUN SERPL-MCNC: 31 MG/DL — HIGH (ref 10–20)
BUN SERPL-MCNC: 32 MG/DL — HIGH (ref 10–20)
CALCIUM SERPL-MCNC: 9.4 MG/DL — SIGNIFICANT CHANGE UP (ref 8.5–10.1)
CALCIUM SERPL-MCNC: 9.6 MG/DL — SIGNIFICANT CHANGE UP (ref 8.5–10.1)
CHLORIDE SERPL-SCNC: 103 MMOL/L — SIGNIFICANT CHANGE UP (ref 98–110)
CHLORIDE SERPL-SCNC: 103 MMOL/L — SIGNIFICANT CHANGE UP (ref 98–110)
CO2 SERPL-SCNC: 19 MMOL/L — SIGNIFICANT CHANGE UP (ref 17–32)
CO2 SERPL-SCNC: 20 MMOL/L — SIGNIFICANT CHANGE UP (ref 17–32)
CREAT SERPL-MCNC: 1.8 MG/DL — HIGH (ref 0.7–1.5)
CREAT SERPL-MCNC: 1.9 MG/DL — HIGH (ref 0.7–1.5)
EOSINOPHIL # BLD AUTO: 0.17 K/UL — SIGNIFICANT CHANGE UP (ref 0–0.7)
EOSINOPHIL NFR BLD AUTO: 1.7 % — SIGNIFICANT CHANGE UP (ref 0–8)
GLUCOSE BLDC GLUCOMTR-MCNC: 125 MG/DL — HIGH (ref 70–99)
GLUCOSE BLDC GLUCOMTR-MCNC: 140 MG/DL — HIGH (ref 70–99)
GLUCOSE BLDC GLUCOMTR-MCNC: 174 MG/DL — HIGH (ref 70–99)
GLUCOSE SERPL-MCNC: 129 MG/DL — HIGH (ref 70–99)
GLUCOSE SERPL-MCNC: 170 MG/DL — HIGH (ref 70–99)
HCT VFR BLD CALC: 32.9 % — LOW (ref 42–52)
HGB BLD-MCNC: 10.5 G/DL — LOW (ref 14–18)
IMM GRANULOCYTES NFR BLD AUTO: 0.5 % — HIGH (ref 0.1–0.3)
LYMPHOCYTES # BLD AUTO: 1.54 K/UL — SIGNIFICANT CHANGE UP (ref 1.2–3.4)
LYMPHOCYTES # BLD AUTO: 15.5 % — LOW (ref 20.5–51.1)
MAGNESIUM SERPL-MCNC: 1.9 MG/DL — SIGNIFICANT CHANGE UP (ref 1.8–2.4)
MCHC RBC-ENTMCNC: 28.2 PG — SIGNIFICANT CHANGE UP (ref 27–31)
MCHC RBC-ENTMCNC: 31.9 G/DL — LOW (ref 32–37)
MCV RBC AUTO: 88.2 FL — SIGNIFICANT CHANGE UP (ref 80–94)
MONOCYTES # BLD AUTO: 0.82 K/UL — HIGH (ref 0.1–0.6)
MONOCYTES NFR BLD AUTO: 8.2 % — SIGNIFICANT CHANGE UP (ref 1.7–9.3)
NEUTROPHILS # BLD AUTO: 7.35 K/UL — HIGH (ref 1.4–6.5)
NEUTROPHILS NFR BLD AUTO: 73.8 % — SIGNIFICANT CHANGE UP (ref 42.2–75.2)
NRBC # BLD: 0 /100 WBCS — SIGNIFICANT CHANGE UP (ref 0–0)
PLATELET # BLD AUTO: 209 K/UL — SIGNIFICANT CHANGE UP (ref 130–400)
POTASSIUM SERPL-MCNC: 5.3 MMOL/L — HIGH (ref 3.5–5)
POTASSIUM SERPL-MCNC: 5.8 MMOL/L — HIGH (ref 3.5–5)
POTASSIUM SERPL-SCNC: 5.3 MMOL/L — HIGH (ref 3.5–5)
POTASSIUM SERPL-SCNC: 5.8 MMOL/L — HIGH (ref 3.5–5)
RBC # BLD: 3.73 M/UL — LOW (ref 4.7–6.1)
RBC # FLD: 11.9 % — SIGNIFICANT CHANGE UP (ref 11.5–14.5)
SODIUM SERPL-SCNC: 140 MMOL/L — SIGNIFICANT CHANGE UP (ref 135–146)
SODIUM SERPL-SCNC: 141 MMOL/L — SIGNIFICANT CHANGE UP (ref 135–146)
WBC # BLD: 9.96 K/UL — SIGNIFICANT CHANGE UP (ref 4.8–10.8)
WBC # FLD AUTO: 9.96 K/UL — SIGNIFICANT CHANGE UP (ref 4.8–10.8)

## 2018-09-19 RX ORDER — ATORVASTATIN CALCIUM 80 MG/1
1 TABLET, FILM COATED ORAL
Qty: 0 | Refills: 0 | COMMUNITY
Start: 2018-09-19

## 2018-09-19 RX ADMIN — HEPARIN SODIUM 5000 UNIT(S): 5000 INJECTION INTRAVENOUS; SUBCUTANEOUS at 21:17

## 2018-09-19 RX ADMIN — ATORVASTATIN CALCIUM 40 MILLIGRAM(S): 80 TABLET, FILM COATED ORAL at 21:17

## 2018-09-19 RX ADMIN — HEPARIN SODIUM 5000 UNIT(S): 5000 INJECTION INTRAVENOUS; SUBCUTANEOUS at 13:04

## 2018-09-19 RX ADMIN — HEPARIN SODIUM 5000 UNIT(S): 5000 INJECTION INTRAVENOUS; SUBCUTANEOUS at 06:24

## 2018-09-19 RX ADMIN — AMLODIPINE BESYLATE 5 MILLIGRAM(S): 2.5 TABLET ORAL at 06:23

## 2018-09-19 RX ADMIN — TAMSULOSIN HYDROCHLORIDE 0.4 MILLIGRAM(S): 0.4 CAPSULE ORAL at 21:17

## 2018-09-19 RX ADMIN — MEMANTINE HYDROCHLORIDE 10 MILLIGRAM(S): 10 TABLET ORAL at 06:23

## 2018-09-19 RX ADMIN — DONEPEZIL HYDROCHLORIDE 10 MILLIGRAM(S): 10 TABLET, FILM COATED ORAL at 21:17

## 2018-09-19 RX ADMIN — Medication 81 MILLIGRAM(S): at 11:53

## 2018-09-19 RX ADMIN — MEMANTINE HYDROCHLORIDE 10 MILLIGRAM(S): 10 TABLET ORAL at 17:43

## 2018-09-19 NOTE — DISCHARGE NOTE ADULT - HOSPITAL COURSE
Timbo Jose is an 83 year old male with PMH of dementia, CKD, DM and HTN who presented to [facility] from [facility] with a fever of 100.5F, suprapubic discomfort and dysuria. In the ED, a catheter was inserted with 1700cc output of maroon colored urine. UA and urine culture were negative. Creatinine on admission was 6.5 (NISHANT on CKD) and the admitting diagnosis was post-renal NISHANT secondary to obstructive uropathy from an enlarged prostate. Renal US revealed mild bilateral hydronephrosis. CT abdomen/pelvis revealed an enlarged prostate 9.9x6.2x7.5 cm. Patient was seen and evaluated by nephrology and urology. NISHANT improved with catheter inserted and relief of urinary obstruction. Per urology recommendations, the catheter must remain in place for a minimum of 2 weeks (inserted on 9/14/18). The patient will remain on Flomax and follow up outpatient for urodynamic studies.     PSA was elevated at 32, raising suspicion for prostatic malignancy. A nuclear medicine bone scan was ordered.    Patient is being discharged back to [facility] and will follow up outpatient with his urologist and primary care doctor. Timbo Jose is an 83 year old male with PMH of dementia, CKD, DM and HTN who presented to [facility] from [facility] with a fever of 100.5F, suprapubic discomfort and dysuria. In the ED, a catheter was inserted with 1700cc output of maroon colored urine. UA and urine culture were negative. Creatinine on admission was 6.5 (NISHANT on CKD) and the admitting diagnosis was post-renal NISHANT secondary to obstructive uropathy from an enlarged prostate. Renal US revealed mild bilateral hydronephrosis. CT abdomen/pelvis revealed an enlarged prostate 9.9x6.2x7.5 cm. Patient was seen and evaluated by nephrology and urology. NISHANT improved with catheter inserted and relief of urinary obstruction. Per urology recommendations, the catheter must remain in place for a minimum of 2 weeks (inserted on 9/14/18). The patient will remain on Flomax and follow up outpatient for urodynamic studies.     PSA was elevated at 32, raising suspicion for prostatic malignancy. A nuclear medicine bone scan was ordered but negative for evidence of metastatic disease.     Patient is being discharged back to [facility] and will follow up outpatient with his urologist and primary care doctor.

## 2018-09-19 NOTE — DISCHARGE NOTE ADULT - PLAN OF CARE
Manage and prevent worsening of renal function You were admitted to the hospital with kidney injury secondary to urinary obstruction, likely due to your enlarged prostate. The obstruction was relieved with insertion of a catheter to drain the urine. Your kidney function has been improving. The urologist recommends keeping the catheter in for a minimum of 2 weeks and taking Flomax. You should follow up with the urologist as an outpatient. Prevent urinary obstruction and kidney damage The obstruction to your urinary outflow was likely due to your enlarged prostate, which was seen on a CT scan of your abdomen and pelvis. Your PSA level was very elevated at 32, which could represent prostate cancer. A bone scan was recommended and performed. You were admitted to the hospital with kidney injury secondary to urinary obstruction, likely due to your enlarged prostate. The obstruction was relieved with insertion of a catheter to drain the urine. Your kidney function has been improving. The urologist recommends keeping the catheter in for a minimum of 2 weeks (inserted on 9/15/18) and taking Flomax. Please follow up with the urologist as an outpatient. You were admitted to the hospital with kidney injury secondary to urinary obstruction, likely due to your enlarged prostate. The obstruction was relieved with insertion of a catheter to drain the urine. Your kidney function has been improving. The urologist recommends keeping the catheter in for a minimum of 2 weeks (inserted on 9/14/18) and taking Flomax. Please follow up with the urologist as an outpatient. The obstruction to your urinary outflow was likely due to your enlarged prostate, which was seen on a CT scan of your abdomen and pelvis. Your PSA level was very elevated at 32. A bone scan was recommended and performed. Please follow up with your urologist outpatient in 1 week to discuss if further imaging studies or labs are recommended.

## 2018-09-19 NOTE — PROGRESS NOTE ADULT - SUBJECTIVE AND OBJECTIVE BOX
SUBJECTIVE:    Patient is a 83y old Male who presents with a chief complaint of Fever and oliguria (19 Sep 2018 10:45)    Currently admitted to medicine with the primary diagnosis of Renal failure     Today is hospital day 4d. No acute events overnight. He denies any headache, chest pain, palpitations, dyspnea, abdominal pain, N/V/D/C this AM. He wants to return to [facility].     PAST MEDICAL & SURGICAL HISTORY  Dementia  Kidney disease  DM (diabetes mellitus)  HTN (hypertension)  H/O knee surgery: right knee    SOCIAL HISTORY:  Negative for smoking/alcohol/drug use.     ALLERGIES:  No Known Allergies    MEDICATIONS:  STANDING MEDICATIONS  amLODIPine   Tablet 5 milliGRAM(s) Oral daily  aspirin enteric coated 81 milliGRAM(s) Oral daily  atorvastatin 40 milliGRAM(s) Oral at bedtime  donepezil 10 milliGRAM(s) Oral at bedtime  heparin  Injectable 5000 Unit(s) SubCutaneous every 8 hours  memantine 10 milliGRAM(s) Oral two times a day  tamsulosin 0.4 milliGRAM(s) Oral at bedtime    PRN MEDICATIONS  acetaminophen  Suppository .. 650 milliGRAM(s) Rectal every 6 hours PRN    VITALS:   T(F): 97.5  HR: 86  BP: 144/70  RR: 18  SpO2: --    LABS:                        10.1   9.17  )-----------( 333      ( 18 Sep 2018 06:39 )             31.2     09-19    141  |  103  |  32<H>  ----------------------------<  129<H>  5.3<H>   |  20  |  1.9<H>    Ca    9.6      19 Sep 2018 08:06  Mg     1.9     09-19    TPro  6.3  /  Alb  3.1<L>  /  TBili  0.3  /  DBili  x   /  AST  32  /  ALT  15  /  AlkPhos  137<H>  09-18    RADIOLOGY:  Renal US performed, results pending  Nuclear Bone Scan ordered     PHYSICAL EXAM:  GEN: No acute distress, lying comfortably in bed   LUNGS: Clear to auscultation bilaterally   HEART: S1/S2 present. RRR.   ABD: Soft, non-tender, non-distended. Bowel sounds present  EXT: Noncyanotic, nonedematous 2+ peripheral pulses   NEURO: AAOX3, baseline dementia     Indwelling Oates catheter for urinary retention

## 2018-09-19 NOTE — DISCHARGE NOTE ADULT - CARE PLAN
Principal Discharge DX:	NISHANT (acute kidney injury)  Goal:	Manage and prevent worsening of renal function  Assessment and plan of treatment:	You were admitted to the hospital with kidney injury secondary to urinary obstruction, likely due to your enlarged prostate. The obstruction was relieved with insertion of a catheter to drain the urine. Your kidney function has been improving. The urologist recommends keeping the catheter in for a minimum of 2 weeks and taking Flomax. You should follow up with the urologist as an outpatient.  Secondary Diagnosis:	Benign prostatic hyperplasia with incomplete bladder emptying  Goal:	Prevent urinary obstruction and kidney damage  Assessment and plan of treatment:	The obstruction to your urinary outflow was likely due to your enlarged prostate, which was seen on a CT scan of your abdomen and pelvis. Your PSA level was very elevated at 32, which could represent prostate cancer. A bone scan was recommended and performed. Principal Discharge DX:	NISHANT (acute kidney injury)  Goal:	Manage and prevent worsening of renal function  Assessment and plan of treatment:	You were admitted to the hospital with kidney injury secondary to urinary obstruction, likely due to your enlarged prostate. The obstruction was relieved with insertion of a catheter to drain the urine. Your kidney function has been improving. The urologist recommends keeping the catheter in for a minimum of 2 weeks (inserted on 9/15/18) and taking Flomax. Please follow up with the urologist as an outpatient.  Secondary Diagnosis:	Benign prostatic hyperplasia with incomplete bladder emptying  Goal:	Prevent urinary obstruction and kidney damage  Assessment and plan of treatment:	The obstruction to your urinary outflow was likely due to your enlarged prostate, which was seen on a CT scan of your abdomen and pelvis. Your PSA level was very elevated at 32, which could represent prostate cancer. A bone scan was recommended and performed. Principal Discharge DX:	NISHANT (acute kidney injury)  Goal:	Manage and prevent worsening of renal function  Assessment and plan of treatment:	You were admitted to the hospital with kidney injury secondary to urinary obstruction, likely due to your enlarged prostate. The obstruction was relieved with insertion of a catheter to drain the urine. Your kidney function has been improving. The urologist recommends keeping the catheter in for a minimum of 2 weeks (inserted on 9/14/18) and taking Flomax. Please follow up with the urologist as an outpatient.  Secondary Diagnosis:	Benign prostatic hyperplasia with incomplete bladder emptying  Goal:	Prevent urinary obstruction and kidney damage  Assessment and plan of treatment:	The obstruction to your urinary outflow was likely due to your enlarged prostate, which was seen on a CT scan of your abdomen and pelvis. Your PSA level was very elevated at 32. A bone scan was recommended and performed. Please follow up with your urologist outpatient in 1 week to discuss if further imaging studies or labs are recommended.

## 2018-09-19 NOTE — DISCHARGE NOTE ADULT - OTHER SIGNIFICANT FINDINGS
EXAM:  CT ABDOMEN AND PELVIS          PROCEDURE DATE:  09/15/2018      INTERPRETATION:  CLINICAL STATEMENT: Hydronephrosis. Urinary retention.      TECHNIQUE: Contiguous axial CT images were obtained from the lower chest   to the pubic symphysis without intravenous contrast.  Oral contrast was   not administered.  Reformatted images in the coronal and sagittal planes   were acquired.    COMPARISON CT: None.    OTHER STUDIES USED FOR CORRELATION: None.     FINDINGS:    LOWER CHEST: Small bilateral pleural effusions with overlying compressive   atelectasis..    HEPATOBILIARY: Unremarkable.    SPLEEN: Unremarkable.    PANCREAS: Unremarkable.    ADRENAL GLANDS: Nonspecific adrenal gland thickening.    KIDNEYS: Mild symmetric bilateral renal hydroureteronephrosis. No renal   calculus. Bilateral perinephric edema.    ABDOMINOPELVIC NODES: Unremarkable.    PELVIC ORGANS: Marked BPH, with extensive median lobe hypertrophy;   prostate gland measures approximately 9.9 x 6.2 x 7.5 cm. Urinary bladder   partially distended with Oates catheter. Diffuse urinary bladder wall   thickening, which may represent sequela of urinary outflow tract   obstruction versus secondary to underdistention.    PERITONEUM/MESENTERY/BOWEL: Unremarkable.    BONES/SOFT TISSUES: Asymmetric pelvic bone trabecular and cortical   thickening; may represent Paget's disease.    OTHER: Vascular calcifications.    IMPRESSION:     1. Marked BPH, with extensive median lobe hypertrophy; prostate gland   measures approximately 9.9 x 6.2 x 7.5 cm. Urinary bladder partially   distended with Oates catheter. Diffuse urinary bladder wall thickening,   which may represent sequela of urinary outflow tract obstruction or is   secondary to underdistention.    2. Mild bilateral renal hydroureteronephrosis, which may represent   sequela of urinary outflow tract obstruction.    TAMMY CABEZAS M.D., ATTENDING RADIOLOGIST  This document has been electronically signed. Sep 15 2018 12:18PM

## 2018-09-19 NOTE — PROGRESS NOTE ADULT - SUBJECTIVE AND OBJECTIVE BOX
YAZAN WALKER  83y, Male      OVERNIGHT EVENTS:    No fevers, no complaints    VITALS:  T(F): 97.5, Max: 97.5 (09-19-18 @ 04:30)  HR: 86  BP: 144/70  RR: 18Vital Signs Last 24 Hrs  T(C): 36.4 (19 Sep 2018 04:30), Max: 36.4 (19 Sep 2018 04:30)  T(F): 97.5 (19 Sep 2018 04:30), Max: 97.5 (19 Sep 2018 04:30)  HR: 86 (19 Sep 2018 04:30) (86 - 95)  BP: 144/70 (19 Sep 2018 04:30) (112/59 - 144/70)  BP(mean): --  RR: 18 (19 Sep 2018 04:30) (18 - 18)  SpO2: --    TESTS & MEASUREMENTS:                        10.1   9.17  )-----------( 333      ( 18 Sep 2018 06:39 )             31.2     09-19    141  |  103  |  32<H>  ----------------------------<  129<H>  5.3<H>   |  20  |  1.9<H>    Ca    9.6      19 Sep 2018 08:06  Mg     1.9     09-19    TPro  6.3  /  Alb  3.1<L>  /  TBili  0.3  /  DBili  x   /  AST  32  /  ALT  15  /  AlkPhos  137<H>  09-18    LIVER FUNCTIONS - ( 18 Sep 2018 06:39 )  Alb: 3.1 g/dL / Pro: 6.3 g/dL / ALK PHOS: 137 U/L / ALT: 15 U/L / AST: 32 U/L / GGT: x             Culture - Urine (collected 09-15-18 @ 00:03)  Source: .Urine Clean Catch (Midstream)  Final Report (09-16-18 @ 14:22):    No growth    Culture - Blood (collected 09-14-18 @ 23:30)  Source: .Blood Blood-Peripheral  Gram Stain (09-18-18 @ 23:22):    Growth in anaerobic bottle: Gram Positive Rods  Preliminary Report (09-18-18 @ 23:22):    Growth in anaerobic bottle: Gram Positive Rods            RADIOLOGY & ADDITIONAL TESTS:    ANTIBIOTICS:

## 2018-09-19 NOTE — DISCHARGE NOTE ADULT - PATIENT PORTAL LINK FT
You can access the [facility] Patient Portal, offered by [facility], by registering with the following website: [email or web address]

## 2018-09-19 NOTE — DISCHARGE NOTE ADULT - CARE PROVIDER_API CALL
Kamryn Rob), Medicine  [address] [place] [address]  Phone: [phone]  Fax: [phone]    Jazmyn Lange), Urology  [address] [place] [address]  Phone: [phone]  Fax: [phone]

## 2018-09-19 NOTE — PROGRESS NOTE ADULT - ASSESSMENT
IMPRESSION:  No pyuria.  CT with BPH. No pyelo.  Hematuria.  See no other focus of infection besides  tract.  Blood culture with gram positive pancho anaerobe. No clinical significance    RECOMMENDATIONS:  Observe off antibiotics  Recall prn please

## 2018-09-19 NOTE — PROGRESS NOTE ADULT - ASSESSMENT
Timbo Jose is a 83M who was BIBA for fever, suprapubic discomfort, and dysuria.    #NISHANT on CKD, post-renal 2/2 obstructive uropathy (BPH), improving   - Cr 6.5 -> 3.9 -> 2.9 -> 2.3 -> 1.9. Unclear baseline, but Cr of 1.4 on 9/11/18   - Anuria 2/2 obstruction relieved by coude cath insertion  - Renal US 9/16: mild bilateral hydronephrosis, enlarged prostate. Repeat renal US ordered.   - Monitor BMP   - I&Os (-1350 over last 24 hrs)   - PSA elevated at 32. Suspicion for prostatic malignancy based on PSA and CT abdomen findings, nuclear medicine bone scan ordered.   - Urology recs: keep catheter in for minimum of 2 weeks (from 9/14/18), Flomax 0.4mg qhs, follow up outpatient for BPH/urodynamic studies.   - Nephro following     #SIRS (fever, tachycardia), resolved   -Pt now afebrile, no leukocytosis   -Urine cx negative. Blood cx positive for gram + rods, but per ID no clinical significance. No abx.     #CT Evidence of Paget's Disease  -ALK Phos 137, asymptomatic     #Mild Dementia  -Environmental control, donepezil 10mg PO qhs, memantine 10mg BID     #DM2  - FS and insulin if FS >180 	    #HTN  -Amlodipine 5mg PO daily   -Off ACE 2/2 NISHANT     #DLD  -Atorvastatin 40mg PO qhs, aspirin 81mg daily     #Normocytic Anemia  - Monitor CBC     DVT PPX: HSQ  Ambulate: OOBTC  Diet: Renal, DASH TLC  DNR/DNI (MOLST in chart), from [facility]   Dispo: Pt will return to [facility] once medically cleared

## 2018-09-19 NOTE — DISCHARGE NOTE ADULT - INSTRUCTIONS
DASH/TLC: Sodium and cholesterol restricted  Consistent carbohydrate  Dysphagia 1 Pureed Thin Liquids

## 2018-09-19 NOTE — PROGRESS NOTE ADULT - ASSESSMENT
Timbo Jose is a 83M who was BIBA for fever, suprapubic discomfort, and dysuria.    #NISHANT on CKD, post-renal 2/2 obstructive uropathy (BPH), improving   - Cr 6.5 -> 3.9 -> 2.9 -> 2.3 -> 1.9. Unclear baseline, but Cr of 1.4 on 9/11/18   - Anuria 2/2 obstruction relieved by coude cath insertion  - Renal US 9/16: mild bilateral hydronephrosis, enlarged prostate. Repeat renal US ordered.   - Monitor BMP   - I&Os (-1350 over last 24 hrs)   - PSA elevated at 32. Suspicion for prostatic malignancy based on PSA and CT abdomen findings, nuclear medicine bone scan ordered.   - Urology recs: keep catheter in for minimum of 2 weeks (from 9/14/18), Flomax 0.4mg qhs, follow up outpatient for BPH/urodynamic studies.   - Nephro following ; DC with catheter,  F/U, labs BIW x 2wk then as directed  -see PSA-prob prostate CA; ? Mets-on CT; consider bone scan    #SIRS (fever, tachycardia), resolved   -Pt now afebrile, no leukocytosis   -Urine cx negative. Blood cx positive for gram + rods, but per ID no clinical significance. No abx.     #CT Evidence of Paget's Disease  -ALK Phos 137, asymptomatic     #Mild Dementia  -Environmental control, donepezil 10mg PO qhs, memantine 10mg BID     #DM2  - FS and insulin if FS >180 	    #HTN  -Amlodipine 5mg PO daily   -Off ACE 2/2 NISHANT     #DLD  -Atorvastatin 40mg PO qhs, aspirin 81mg daily     #Normocytic Anemia  - Monitor CBC     DVT PPX: HSQ  Ambulate: OOBTC  Diet: Renal, DASH TLC  DNR/DNI (MOLST in chart), from [facility]   Dispo: Pt will return to [facility] once medically cleared

## 2018-09-19 NOTE — PROGRESS NOTE ADULT - ASSESSMENT
NISHANT /HAGMA/ hyperkalemia:    # multifactorial : obstruction with prerenal  # creatinine improving   # non oliguric  # keep de leon in  # BP at goal   # ct noted : sono noted BPH   # check ph level  # ID notes appreciated, off antibx for now   #will follow

## 2018-09-19 NOTE — DISCHARGE NOTE ADULT - MEDICATION SUMMARY - MEDICATIONS TO TAKE
I will START or STAY ON the medications listed below when I get home from the hospital:    Aspir 81 oral delayed release tablet  -- 1 tab(s) by mouth once a day  -- Indication: For anti-paltelet    Flomax 0.4 mg oral capsule  -- 1 cap(s) by mouth once a day  -- Indication: For BPH    atorvastatin 40 mg oral tablet  -- 1 tab(s) by mouth once a day (at bedtime)  -- Indication: For Hyperlipidemia    amLODIPine 5 mg oral tablet  -- 1 tab(s) by mouth once a day  -- Indication: For Hypertension    donepezil 10 mg oral tablet  -- 1 tab(s) by mouth once a day (at bedtime)  -- Indication: For Dementia    ferrous sulfate 324 mg (65 mg elemental iron) oral tablet  -- orally once a day  -- Indication: For Supplement    memantine 10 mg oral tablet  -- 1 tab(s) by mouth 2 times a day  -- Indication: For Dementia I will START or STAY ON the medications listed below when I get home from the hospital:    Aspir 81 oral delayed release tablet  -- 1 tab(s) by mouth once a day  -- Indication: For Anti-platelet    sodium bicarbonate 650 mg oral tablet  -- 1 tab(s) by mouth every 8 hours  -- Indication: For Chronic Kidney Disease    Flomax 0.4 mg oral capsule  -- 1 cap(s) by mouth once a day  -- Indication: For BPH    atorvastatin 40 mg oral tablet  -- 1 tab(s) by mouth once a day (at bedtime)  -- Indication: For Hyperlipidemia    amLODIPine 5 mg oral tablet  -- 1 tab(s) by mouth once a day  -- Indication: For Hypertension    donepezil 10 mg oral tablet  -- 1 tab(s) by mouth once a day (at bedtime)  -- Indication: For Dementia    ferrous sulfate 324 mg (65 mg elemental iron) oral tablet  -- orally once a day  -- Indication: For Supplement    memantine 10 mg oral tablet  -- 1 tab(s) by mouth 2 times a day  -- Indication: For Dementia

## 2018-09-19 NOTE — PROGRESS NOTE ADULT - SUBJECTIVE AND OBJECTIVE BOX
Nephrology progress note    Patient is seen and examined, events over the last 24 h noted .  confused but responsive  No other complaints     Allergies:  No Known Allergies    Hospital Medications:   MEDICATIONS  (STANDING):  amLODIPine   Tablet 5 milliGRAM(s) Oral daily  aspirin enteric coated 81 milliGRAM(s) Oral daily  atorvastatin 40 milliGRAM(s) Oral at bedtime  donepezil 10 milliGRAM(s) Oral at bedtime  heparin  Injectable 5000 Unit(s) SubCutaneous every 8 hours  memantine 10 milliGRAM(s) Oral two times a day  tamsulosin 0.4 milliGRAM(s) Oral at bedtime        VITALS:  T(F): 96.8 (18 @ 13:00), Max: 97.5 (18 @ 04:30)  HR: 81 (18 @ 13:00)  BP: 139/66 (18 @ 13:00)  RR: 18 (18 @ 13:00)     @ 07:01  -   @ 07:00  --------------------------------------------------------  IN: 670 mL / OUT: 1500 mL / NET: -830 mL     @ 07:01  -   @ 07:00  --------------------------------------------------------  IN: 0 mL / OUT: 1650 mL / NET: -1650 mL     @ 07:01  -   @ 14:46  --------------------------------------------------------  IN: 210 mL / OUT: 400 mL / NET: -190 mL          PHYSICAL EXAM:  Constitutional: lethargic , confused   HEENT: anicteric sclera, oropharynx clear, MMM  Neck: No JVD  Respiratory: CTAB, no wheezes, rales or rhonchi  Cardiovascular: S1, S2, RRR  Gastrointestinal: BS+, soft, NT/ND  Extremities: No cyanosis or clubbing. No peripheral edema  :  positive de leon    Skin: No rashes    LABS:      141  |  103  |  32<H>  ----------------------------<  129<H>  5.3<H>   |  20  |  1.9<H>  Creatinine Trend: 1.9<--, 2.3<--, 2.9<--, 3.9<--, 5.3<--, 6.5<--  Ca    9.6      19 Sep 2018 08:06  Mg     1.9         TPro  6.3  /  Alb  3.1<L>  /  TBili  0.3  /  DBili      /  AST  32  /  ALT  15  /  AlkPhos  137<H>                            10.5   9.96  )-----------( 209      ( 19 Sep 2018 08:06 )             32.9       Urine Studies:  Urinalysis Basic - ( 15 Sep 2018 00:03 )    Color: Dark Yellow / Appearance: Cloudy / S.015 / pH:   Gluc:  / Ketone: Negative  / Bili: Negative / Urobili: 0.2 mg/dL   Blood:  / Protein: Trace mg/dL / Nitrite: Negative   Leuk Esterase: Negative / RBC: >50 /HPF / WBC    Sq Epi:  / Non Sq Epi: Occasional /HPF / Bacteria: occ /HPF        RADIOLOGY & ADDITIONAL STUDIES:  < from: US Renal (18 @ 10:21) >  IMPRESSION:  Mild bilateral hydronephrosis, correlating with CT.  Enlarged prostate with estimated volume 170 cc.  Urinary bladder nondistended with De Leon catheter.    < end of copied text >

## 2018-09-19 NOTE — DISCHARGE NOTE ADULT - ADDITIONAL INSTRUCTIONS
Please follow up outpatient with your urologist.  Please follow up outpatient with your primary care doctor.  Take all discharge medications as directed. Please follow up outpatient with your urologist within 1 week.   Please follow up outpatient with your primary care doctor within 1 week.   Take all discharge medications as directed.

## 2018-09-19 NOTE — DISCHARGE NOTE ADULT - CARE PROVIDERS DIRECT ADDRESSES
[email or web address]

## 2018-09-19 NOTE — PROGRESS NOTE ADULT - SUBJECTIVE AND OBJECTIVE BOX
Chart reviewed, patient examined. Pertinent results reviewed.  Case discussed with HO  specialist f/u reviewed  HD#5    SUBJECTIVE:    Patient is a 83y old Male who presents with a chief complaint of Fever and oliguria (19 Sep 2018 10:45); urine output good since de leon placed    Currently admitted to medicine with the primary diagnosis of Renal failure-improving      No acute events overnight. He denies any headache, chest pain, palpitations, dyspnea, abdominal pain, N/V/D/C this AM. He wants to return [facility].     PAST MEDICAL & SURGICAL HISTORY  Dementia  Kidney disease  DM (diabetes mellitus)  HTN (hypertension)  H/O knee surgery: right knee    SOCIAL HISTORY:  Negative for smoking/alcohol/drug use.     ALLERGIES:  No Known Allergies    MEDICATIONS:  STANDING MEDICATIONS  amLODIPine   Tablet 5 milliGRAM(s) Oral daily  aspirin enteric coated 81 milliGRAM(s) Oral daily  atorvastatin 40 milliGRAM(s) Oral at bedtime  donepezil 10 milliGRAM(s) Oral at bedtime  heparin  Injectable 5000 Unit(s) SubCutaneous every 8 hours  memantine 10 milliGRAM(s) Oral two times a day  tamsulosin 0.4 milliGRAM(s) Oral at bedtime    PRN MEDICATIONS  acetaminophen  Suppository .. 650 milliGRAM(s) Rectal every 6 hours PRN  Vital Signs Last 24 Hrs  T(C): 36.4 (19 Sep 2018 04:30), Max: 36.4 (19 Sep 2018 04:30)  T(F): 97.5 (19 Sep 2018 04:30), Max: 97.5 (19 Sep 2018 04:30)  HR: 86 (19 Sep 2018 04:30) (86 - 95)  BP: 144/70 (19 Sep 2018 04:30) (112/59 - 144/70)  BP(mean): --  RR: 18 (19 Sep 2018 04:30) (18 - 18)  SpO2: -- --    LABS:                        10.1   9.17  )-----------( 333      ( 18 Sep 2018 06:39 )             31.2     09-19    141  |  103  |  32<H>  ----------------------------<  129<H>  5.3<H>   |  20  |  1.9<H>    Ca    9.6      19 Sep 2018 08:06  Mg     1.9     09-19    TPro  6.3  /  Alb  3.1<L>  /  TBili  0.3  /  DBili  x   /  AST  32  /  ALT  15  /  AlkPhos  137<H>  09-18    RADIOLOGY:  Renal US performed, results pending  Nuclear Bone Scan ordered     PHYSICAL EXAM:  GEN: No acute distress, lying comfortably in bed ; Ox2- (his name, in hospital)  LUNGS: Clear to auscultation bilaterally   HEART: S1/S2 present. RRR.   ABD: Soft, non-tender, non-distended. Bowel sounds present  EXT: Noncyanotic, nonedematous 2+ peripheral pulses   NEURO: AAOX3, baseline dementia     Indwelling De Leon catheter for urinary retention

## 2018-09-20 DIAGNOSIS — Z79.83 LONG TERM (CURRENT) USE OF BISPHOSPHONATES: ICD-10-CM

## 2018-09-20 DIAGNOSIS — G30.9 ALZHEIMER'S DISEASE, UNSPECIFIED: ICD-10-CM

## 2018-09-20 DIAGNOSIS — Z28.21 IMMUNIZATION NOT CARRIED OUT BECAUSE OF PATIENT REFUSAL: ICD-10-CM

## 2018-09-20 DIAGNOSIS — F02.80 DEMENTIA IN OTHER DISEASES CLASSIFIED ELSEWHERE, UNSPECIFIED SEVERITY, WITHOUT BEHAVIORAL DISTURBANCE, PSYCHOTIC DISTURBANCE, MOOD DISTURBANCE, AND ANXIETY: ICD-10-CM

## 2018-09-20 DIAGNOSIS — E86.0 DEHYDRATION: ICD-10-CM

## 2018-09-20 DIAGNOSIS — N17.9 ACUTE KIDNEY FAILURE, UNSPECIFIED: ICD-10-CM

## 2018-09-20 DIAGNOSIS — R41.82 ALTERED MENTAL STATUS, UNSPECIFIED: ICD-10-CM

## 2018-09-20 DIAGNOSIS — E11.9 TYPE 2 DIABETES MELLITUS WITHOUT COMPLICATIONS: ICD-10-CM

## 2018-09-20 DIAGNOSIS — Z79.82 LONG TERM (CURRENT) USE OF ASPIRIN: ICD-10-CM

## 2018-09-20 DIAGNOSIS — G93.41 METABOLIC ENCEPHALOPATHY: ICD-10-CM

## 2018-09-20 DIAGNOSIS — R13.10 DYSPHAGIA, UNSPECIFIED: ICD-10-CM

## 2018-09-20 DIAGNOSIS — I10 ESSENTIAL (PRIMARY) HYPERTENSION: ICD-10-CM

## 2018-09-20 LAB
ANION GAP SERPL CALC-SCNC: 14 MMOL/L — SIGNIFICANT CHANGE UP (ref 7–14)
BASOPHILS # BLD AUTO: 0.03 K/UL — SIGNIFICANT CHANGE UP (ref 0–0.2)
BASOPHILS NFR BLD AUTO: 0.3 % — SIGNIFICANT CHANGE UP (ref 0–1)
BUN SERPL-MCNC: 28 MG/DL — HIGH (ref 10–20)
CALCIUM SERPL-MCNC: 9.4 MG/DL — SIGNIFICANT CHANGE UP (ref 8.5–10.1)
CHLORIDE SERPL-SCNC: 108 MMOL/L — SIGNIFICANT CHANGE UP (ref 98–110)
CO2 SERPL-SCNC: 23 MMOL/L — SIGNIFICANT CHANGE UP (ref 17–32)
CREAT SERPL-MCNC: 1.7 MG/DL — HIGH (ref 0.7–1.5)
EOSINOPHIL # BLD AUTO: 0.33 K/UL — SIGNIFICANT CHANGE UP (ref 0–0.7)
EOSINOPHIL NFR BLD AUTO: 3.4 % — SIGNIFICANT CHANGE UP (ref 0–8)
GLUCOSE BLDC GLUCOMTR-MCNC: 130 MG/DL — HIGH (ref 70–99)
GLUCOSE BLDC GLUCOMTR-MCNC: 144 MG/DL — HIGH (ref 70–99)
GLUCOSE BLDC GLUCOMTR-MCNC: 147 MG/DL — HIGH (ref 70–99)
GLUCOSE BLDC GLUCOMTR-MCNC: 173 MG/DL — HIGH (ref 70–99)
GLUCOSE SERPL-MCNC: 124 MG/DL — HIGH (ref 70–99)
HCT VFR BLD CALC: 30.5 % — LOW (ref 42–52)
HGB BLD-MCNC: 9.7 G/DL — LOW (ref 14–18)
IMM GRANULOCYTES NFR BLD AUTO: 0.5 % — HIGH (ref 0.1–0.3)
LYMPHOCYTES # BLD AUTO: 1.79 K/UL — SIGNIFICANT CHANGE UP (ref 1.2–3.4)
LYMPHOCYTES # BLD AUTO: 18.2 % — LOW (ref 20.5–51.1)
MAGNESIUM SERPL-MCNC: 1.7 MG/DL — LOW (ref 1.8–2.4)
MCHC RBC-ENTMCNC: 28.1 PG — SIGNIFICANT CHANGE UP (ref 27–31)
MCHC RBC-ENTMCNC: 31.8 G/DL — LOW (ref 32–37)
MCV RBC AUTO: 88.4 FL — SIGNIFICANT CHANGE UP (ref 80–94)
MONOCYTES # BLD AUTO: 0.86 K/UL — HIGH (ref 0.1–0.6)
MONOCYTES NFR BLD AUTO: 8.7 % — SIGNIFICANT CHANGE UP (ref 1.7–9.3)
NEUTROPHILS # BLD AUTO: 6.78 K/UL — HIGH (ref 1.4–6.5)
NEUTROPHILS NFR BLD AUTO: 68.9 % — SIGNIFICANT CHANGE UP (ref 42.2–75.2)
NRBC # BLD: 0 /100 WBCS — SIGNIFICANT CHANGE UP (ref 0–0)
PHOSPHATE SERPL-MCNC: 4.2 MG/DL — SIGNIFICANT CHANGE UP (ref 2.1–4.9)
PLATELET # BLD AUTO: 364 K/UL — SIGNIFICANT CHANGE UP (ref 130–400)
POTASSIUM SERPL-MCNC: 4.6 MMOL/L — SIGNIFICANT CHANGE UP (ref 3.5–5)
POTASSIUM SERPL-SCNC: 4.6 MMOL/L — SIGNIFICANT CHANGE UP (ref 3.5–5)
RBC # BLD: 3.45 M/UL — LOW (ref 4.7–6.1)
RBC # FLD: 11.8 % — SIGNIFICANT CHANGE UP (ref 11.5–14.5)
SODIUM SERPL-SCNC: 145 MMOL/L — SIGNIFICANT CHANGE UP (ref 135–146)
WBC # BLD: 9.84 K/UL — SIGNIFICANT CHANGE UP (ref 4.8–10.8)
WBC # FLD AUTO: 9.84 K/UL — SIGNIFICANT CHANGE UP (ref 4.8–10.8)

## 2018-09-20 RX ORDER — SODIUM BICARBONATE 1 MEQ/ML
650 SYRINGE (ML) INTRAVENOUS EVERY 8 HOURS
Qty: 0 | Refills: 0 | Status: DISCONTINUED | OUTPATIENT
Start: 2018-09-20 | End: 2018-09-21

## 2018-09-20 RX ORDER — MAGNESIUM SULFATE 500 MG/ML
2 VIAL (ML) INJECTION ONCE
Qty: 0 | Refills: 0 | Status: COMPLETED | OUTPATIENT
Start: 2018-09-20 | End: 2018-09-20

## 2018-09-20 RX ORDER — SODIUM POLYSTYRENE SULFONATE 4.1 MEQ/G
30 POWDER, FOR SUSPENSION ORAL ONCE
Qty: 0 | Refills: 0 | Status: COMPLETED | OUTPATIENT
Start: 2018-09-20 | End: 2018-09-20

## 2018-09-20 RX ADMIN — AMLODIPINE BESYLATE 5 MILLIGRAM(S): 2.5 TABLET ORAL at 05:16

## 2018-09-20 RX ADMIN — Medication 650 MILLIGRAM(S): at 14:10

## 2018-09-20 RX ADMIN — SODIUM POLYSTYRENE SULFONATE 30 GRAM(S): 4.1 POWDER, FOR SUSPENSION ORAL at 00:44

## 2018-09-20 RX ADMIN — Medication 81 MILLIGRAM(S): at 11:00

## 2018-09-20 RX ADMIN — DONEPEZIL HYDROCHLORIDE 10 MILLIGRAM(S): 10 TABLET, FILM COATED ORAL at 21:11

## 2018-09-20 RX ADMIN — Medication 50 GRAM(S): at 10:49

## 2018-09-20 RX ADMIN — ATORVASTATIN CALCIUM 40 MILLIGRAM(S): 80 TABLET, FILM COATED ORAL at 21:11

## 2018-09-20 RX ADMIN — HEPARIN SODIUM 5000 UNIT(S): 5000 INJECTION INTRAVENOUS; SUBCUTANEOUS at 05:17

## 2018-09-20 RX ADMIN — MEMANTINE HYDROCHLORIDE 10 MILLIGRAM(S): 10 TABLET ORAL at 05:16

## 2018-09-20 RX ADMIN — MEMANTINE HYDROCHLORIDE 10 MILLIGRAM(S): 10 TABLET ORAL at 17:06

## 2018-09-20 RX ADMIN — HEPARIN SODIUM 5000 UNIT(S): 5000 INJECTION INTRAVENOUS; SUBCUTANEOUS at 21:12

## 2018-09-20 RX ADMIN — Medication 650 MILLIGRAM(S): at 21:11

## 2018-09-20 RX ADMIN — TAMSULOSIN HYDROCHLORIDE 0.4 MILLIGRAM(S): 0.4 CAPSULE ORAL at 21:11

## 2018-09-20 RX ADMIN — HEPARIN SODIUM 5000 UNIT(S): 5000 INJECTION INTRAVENOUS; SUBCUTANEOUS at 14:10

## 2018-09-20 NOTE — PROVIDER CONTACT NOTE (OTHER) - ACTION/TREATMENT ORDERED:
MD will order Dextrose and insulin. MD will admin once available. will continue to monitor and assess

## 2018-09-20 NOTE — CHART NOTE - NSCHARTNOTEFT_GEN_A_CORE
LEANDER    Called to see pt with Oates balloon inflated in his prostate.  Pt with large prostate.  balloon was deflated and repositioned. Clear yellow urine   began draining. A little blood was at the meatus.    A) Oates reposition    P) continue Oates as needed  no further acute urologic intervention.  will d/w Dr. Ramirez

## 2018-09-20 NOTE — PROGRESS NOTE ADULT - ASSESSMENT
Timbo Jose is a 83M who was BIBA for fever, suprapubic discomfort, and dysuria.    NISHANT on CKD, postobstructive uropathy (BPH), improving   - Cr 6.5 -> 3.9 -> 2.9 -> 2.3 -> 1.9 -> 1.7 t  - maintain de leon catheter  - Renal sono: mild bilateral hydronephrosis, enlarged prostate   - Monitor BMP   - PSA level 34  - Urology recs: keep catheter in for minimum of 2 weeks (from 9/14/18)  - decompressive hematuria is expected - irrigate PRN  -  Flomax 0.4mg with dinner  - started on Bicarb    SIRS (fever, tachycardia), resolved   - urine cx negative  - Blood cx negative   - ID f/u noted  - antibiotics discontinued    Dementia  - Donepezil 10mg PO qhs     DM2  - FS and insulin if FS >180 	    HTN  - Amlodipine 5mg PO daily   - Off ACE and Lasix    DLD  - Atorvastatin 40mg PO qhs     Normocytic Anemia  - Hgb stable  - Ferrous sulfate 325mg PO daily      Hyperkalemia, resolved    Paget's Disease on CT scan and confirmed with bonescan  - ALK phos noted    DVT PPX: Heparin SQ  Ambulate: OOBTC  Diet: Renal, DASH TLC  DNR/DNI (MOLST in chart), from [facility] , Discharge planning, pending auth

## 2018-09-20 NOTE — PROGRESS NOTE ADULT - SUBJECTIVE AND OBJECTIVE BOX
seen and examined  no distress  lethargic       Standing Inpatient Medications  amLODIPine   Tablet 5 milliGRAM(s) Oral daily  aspirin enteric coated 81 milliGRAM(s) Oral daily  atorvastatin 40 milliGRAM(s) Oral at bedtime  donepezil 10 milliGRAM(s) Oral at bedtime  heparin  Injectable 5000 Unit(s) SubCutaneous every 8 hours  memantine 10 milliGRAM(s) Oral two times a day  tamsulosin 0.4 milliGRAM(s) Oral at bedtime        VITALS/PHYSICAL EXAM  --------------------------------------------------------------------------------  T(C): 36.8 (09-20-18 @ 04:30), Max: 36.8 (09-20-18 @ 04:30)  HR: 84 (09-20-18 @ 04:30) (81 - 84)  BP: 131/69 (09-20-18 @ 04:30) (125/66 - 139/66)  RR: 18 (09-20-18 @ 04:30) (18 - 18)  SpO2: --  Wt(kg): --        09-19-18 @ 07:01  -  09-20-18 @ 07:00  --------------------------------------------------------  IN: 210 mL / OUT: 1100 mL / NET: -890 mL      Physical Exam:  	Gen: NAD  	Pulm: decrease BS  B/L  	CV:  S1S2; no rub  	Abd: +distended  	: moises   	LE: no edema  	    LABS/STUDIES  --------------------------------------------------------------------------------              10.5   9.96  >-----------<  209      [09-19-18 @ 08:06]              32.9     140  |  103  |  31  ----------------------------<  170      [09-19-18 @ 21:46]  5.8   |  19  |  1.8        Ca     9.4     [09-19-18 @ 21:46]      Mg     1.9     [09-19-18 @ 08:06]            Creatinine Trend:  SCr 1.8 [09-19 @ 21:46]  SCr 1.9 [09-19 @ 08:06]  SCr 2.3 [09-18 @ 06:39]  SCr 2.9 [09-17 @ 06:46]  SCr 3.9 [09-16 @ 07:31]    Urinalysis - [09-15-18 @ 00:03]      Color Dark Yellow / Appearance Cloudy / SG 1.015 / pH 5.5      Gluc Negative / Ketone Negative  / Bili Negative / Urobili 0.2       Blood Large / Protein Trace / Leuk Est Negative / Nitrite Negative      RBC >50 / WBC  / Hyaline  / Gran  / Sq Epi  / Non Sq Epi Occasional / Bacteria occ      Iron 46, TIBC 146, %sat 32      [09-17-18 @ 10:50]  Ferritin 441      [09-17-18 @ 10:50]  TSH 1.21      [09-08-18 @ 11:24]      Syphilis Screen (Treponema Pallidum Ab) Negative      [09-08-18 @ 11:24]

## 2018-09-20 NOTE — PROGRESS NOTE ADULT - ASSESSMENT
Timbo Jose is a 83M who was BIBA for fever, suprapubic discomfort, and dysuria.    #NISHANT on CKD, post-renal 2/2 obstructive uropathy (BPH), improving   - Cr 6.5 ->>>> 1.7. Unclear baseline, but Cr of 1.4 on 9/11/18   - Anuria 2/2 obstruction relieved by coude cath insertion  - Monitor BMP   - I&Os (-890 over last 24 hrs)   - PSA elevated at 32. Suspicion for prostatic malignancy based on PSA and CT abdomen findings. Bone scan without evidence of metastatic disease.   - Urology recs: keep catheter in for minimum of 2 weeks (from 9/14/18), Flomax 0.4mg qhs, follow up outpatient for BPH/urodynamic studies.   - Nephro: Na HCO3- added     #SIRS (fever, tachycardia), resolved   -Pt now afebrile, no leukocytosis   -Urine cx negative. Blood cx positive for gram + rods, but per ID no clinical significance. No abx.     #CT/Bone Scan Evidence of Paget's Disease  -ALK Phos 137, asymptomatic     #Mild Dementia  -Environmental control, donepezil 10mg PO qhs, memantine 10mg BID     #DM2  - FS and insulin if FS >180 	    #HTN  -Amlodipine 5mg PO daily   -Off ACE 2/2 NISHANT     #DLD  -Atorvastatin 40mg PO qhs, aspirin 81mg daily     #Normocytic Anemia  - Monitor CBC     DVT PPX: HSQ  Ambulate: OOBTC  Diet: Renal, DASH TLC  DNR/DNI (MOLST in chart), from [facility]   Dispo: Pending placement, medically cleared for discharge Timbo Jose is a 83M who was BIBA for fever, suprapubic discomfort, and dysuria.    #NISHANT on CKD, post-renal 2/2 obstructive uropathy (BPH), improving   - Cr 6.5 ->>>> 1.7. Unclear baseline, but Cr of 1.4 on 9/11/18   - Anuria 2/2 obstruction relieved by coude cath insertion  - Monitor BMP   - I&Os (-890 over last 24 hrs)   - PSA elevated at 32. Suspicion for prostatic malignancy based on PSA and CT abdomen findings. Bone scan without evidence of metastatic disease.   - Urology recs: keep catheter in for minimum of 2 weeks (from 9/14/18), Flomax 0.4mg qhs, follow up outpatient for BPH/urodynamic studies.   - Nephro: Na HCO3- added     #SIRS (fever, tachycardia), resolved   -Pt now afebrile, no leukocytosis   -Urine cx negative. Blood cx positive for gram + rods, but per ID no clinical significance. No abx.     #CT/Bone Scan Evidence of Paget's Disease  -ALK Phos 137, asymptomatic     #Mild Dementia  -Environmental control, donepezil 10mg PO qhs, memantine 10mg BID     #DM2  - FS and insulin if FS >180 	    #HTN  -Amlodipine 5mg PO daily   -Off ACE 2/2 NISHANT     #DLD  -Atorvastatin 40mg PO qhs, aspirin 81mg daily     #Normocytic Anemia  - Monitor CBC     #Hypomagnesemia  -Repleted, monitor BMP     DVT PPX: HSQ  Ambulate: OOBTC  Diet: Renal, DASH TLC  DNR/DNI (MOLST in chart), from [facility]   Dispo: Pending placement, medically cleared for discharge

## 2018-09-20 NOTE — PROGRESS NOTE ADULT - SUBJECTIVE AND OBJECTIVE BOX
SUBJECTIVE:    Patient is a 83y old Male who presents with a chief complaint of Fever and oliguria (20 Sep 2018 08:14)    Currently admitted to medicine with the primary diagnosis of NISHANT (acute kidney injury)     Today is hospital day 5d. No acute events overnight. The patient denies any headache, chest pain, dyspnea, palpitations, abdominal pain, N/V/D/C at this time. His Oates catheter balloon was malpositioned in the prostate gland. Urology was called and catheter successfully deflated and repositioned. It is draining without obstruction or hematuria at this time. Patient is medically cleared for discharge and can follow up with urology outpatient - pending authorization.     PAST MEDICAL & SURGICAL HISTORY  Dementia  Kidney disease  DM (diabetes mellitus)  HTN (hypertension)  H/O knee surgery: right knee    SOCIAL HISTORY:  Negative for smoking/alcohol/drug use.     ALLERGIES:  No Known Allergies    MEDICATIONS:  STANDING MEDICATIONS  amLODIPine   Tablet 5 milliGRAM(s) Oral daily  aspirin enteric coated 81 milliGRAM(s) Oral daily  atorvastatin 40 milliGRAM(s) Oral at bedtime  donepezil 10 milliGRAM(s) Oral at bedtime  heparin  Injectable 5000 Unit(s) SubCutaneous every 8 hours  memantine 10 milliGRAM(s) Oral two times a day  sodium bicarbonate 650 milliGRAM(s) Oral every 8 hours  tamsulosin 0.4 milliGRAM(s) Oral at bedtime    PRN MEDICATIONS  acetaminophen  Suppository .. 650 milliGRAM(s) Rectal every 6 hours PRN    VITALS:   T(F): 97.6  HR: 71  BP: 119/62  RR: 18  SpO2: 99%    LABS:                        9.7    9.84  )-----------( 364      ( 20 Sep 2018 06:14 )             30.5     09-20    145  |  108  |  28<H>  ----------------------------<  124<H>  4.6   |  23  |  1.7<H>    Ca    9.4      20 Sep 2018 06:14  Phos  4.2     09-20  Mg     1.7     09-20    RADIOLOGY:  Bone scan    No definite evidence for metastatic bone disease  Intense confluent activity throughout the left hemipelvis, the proximal   right femur and right frontoparietal skull,  characteristic for Pagets   disease and when compared with the CT of 9/06/18 and CT of 9/06/18, the   findings on CT and bone imaging are consistent with Paget's disease  Degenerative changes at T5-T7 and peripheral joints      PHYSICAL EXAM:  GEN: No acute distress  LUNGS: Clear to auscultation bilaterally   HEART: S1/S2 present. RRR.   ABD: Soft, non-tender, non-distended. Bowel sounds present  EXT: NC/NC/NE/2+PP/PANDYA/Skin Intact.   NEURO: AAOX3    Oates catheter in place secondary to urinary retention from outflow obstruction

## 2018-09-20 NOTE — PROGRESS NOTE ADULT - SUBJECTIVE AND OBJECTIVE BOX
YAZAN WALKER  83y  Male  HPI:  anemia, dementia, DLD, HTN, CKD, BPH, DM2 presented for fever and lower abdominal pain for 1 day duration.  Patient is a resident at [facility], was discharged on 9/11 from our hospital after being treated for NISHANT 2/2 dehydration and medications. He was sent in for fever Tmax 100.5, with suprapubic pain and dysuria. Patient is a poor historian and hx was provided partly by NH chart. Stat labs and a CXR were obtained in NH which showed a rise in Cr from 1.4 on 9/11 to 5.5 and he had no urine output, and CXR showed L lung infiltrate vs effusion so was BIBEMS for evaluation. In ED coude catheter was inserted and he had 1700 cc output of maroon urine. Patient felt instant relief of abdominal pain. He had no hypotension, syncope, back pain, chest pain, palpitations.    Tried to contact Daughter many times could not get through. (15 Sep 2018 03:05)    MEDICATIONS  (STANDING):  amLODIPine   Tablet 5 milliGRAM(s) Oral daily  aspirin enteric coated 81 milliGRAM(s) Oral daily  atorvastatin 40 milliGRAM(s) Oral at bedtime  donepezil 10 milliGRAM(s) Oral at bedtime  heparin  Injectable 5000 Unit(s) SubCutaneous every 8 hours  memantine 10 milliGRAM(s) Oral two times a day  sodium bicarbonate 650 milliGRAM(s) Oral every 8 hours  tamsulosin 0.4 milliGRAM(s) Oral at bedtime    MEDICATIONS  (PRN):  acetaminophen  Suppository .. 650 milliGRAM(s) Rectal every 6 hours PRN Temp greater or equal to 38.5C (101.3F)    INTERVAL EVENTS: Patient seen today without distress, gives no complaints.    T(C): 36.4 (09-20-18 @ 13:59), Max: 36.8 (09-20-18 @ 04:30)  HR: 71 (09-20-18 @ 13:59) (71 - 84)  BP: 119/62 (09-20-18 @ 13:59) (119/62 - 131/69)  RR: 18 (09-20-18 @ 13:59) (18 - 18)  SpO2: 99% (09-20-18 @ 07:30) (99% - 99%)  Wt(kg): --Vital Signs Last 24 Hrs  T(C): 36.4 (20 Sep 2018 13:59), Max: 36.8 (20 Sep 2018 04:30)  T(F): 97.6 (20 Sep 2018 13:59), Max: 98.2 (20 Sep 2018 04:30)  HR: 71 (20 Sep 2018 13:59) (71 - 84)  BP: 119/62 (20 Sep 2018 13:59) (119/62 - 131/69)  BP(mean): --  RR: 18 (20 Sep 2018 13:59) (18 - 18)  SpO2: 99% (20 Sep 2018 07:30) (99% - 99%)    PHYSICAL EXAM:  GENERAL: NAD  NECK: Supple, No JVD  CHEST/LUNG: Shallow; No wheezing  HEART: S1, S2, Regular rate and rhythm;   ABDOMEN: Soft, Nontender, Nondistended; Bowel sounds present, de leon in place, urine clear  EXTREMITIES: trace edema    LABS:  Labs:                        9.7    9.84  )-----------( 364      ( 20 Sep 2018 06:14 )             30.5             09-20    145  |  108  |  28<H>  ----------------------------<  124<H>  4.6   |  23  |  1.7<H>    Ca    9.4      20 Sep 2018 06:14  Phos  4.2     09-20  Mg     1.7     09-20      RADIOLOGY & ADDITIONAL TESTS:  < from: NM SPECT Bone Scan (09.19.18 @ 17:54) >  No definite evidence for metastatic bone disease  Intense confluent activity throughout the left hemipelvis, the proximal   right femur and right frontoparietal skull,  characteristic for Pagets   disease and when compared with the CT of 9/06/18 and CT of 9/06/18, the   findings on CT and bone imaging are consistent with Paget's disease    Degenerative changes at T5-T7 and peripheral joints    < end of copied text >

## 2018-09-21 VITALS
SYSTOLIC BLOOD PRESSURE: 169 MMHG | TEMPERATURE: 98 F | RESPIRATION RATE: 18 BRPM | HEART RATE: 67 BPM | DIASTOLIC BLOOD PRESSURE: 70 MMHG

## 2018-09-21 LAB
ANION GAP SERPL CALC-SCNC: 12 MMOL/L — SIGNIFICANT CHANGE UP (ref 7–14)
BASOPHILS # BLD AUTO: 0.03 K/UL — SIGNIFICANT CHANGE UP (ref 0–0.2)
BASOPHILS NFR BLD AUTO: 0.4 % — SIGNIFICANT CHANGE UP (ref 0–1)
BUN SERPL-MCNC: 25 MG/DL — HIGH (ref 10–20)
CALCIUM SERPL-MCNC: 9.3 MG/DL — SIGNIFICANT CHANGE UP (ref 8.5–10.1)
CHLORIDE SERPL-SCNC: 108 MMOL/L — SIGNIFICANT CHANGE UP (ref 98–110)
CO2 SERPL-SCNC: 25 MMOL/L — SIGNIFICANT CHANGE UP (ref 17–32)
CREAT SERPL-MCNC: 1.5 MG/DL — SIGNIFICANT CHANGE UP (ref 0.7–1.5)
CULTURE RESULTS: SIGNIFICANT CHANGE UP
EOSINOPHIL # BLD AUTO: 0.21 K/UL — SIGNIFICANT CHANGE UP (ref 0–0.7)
EOSINOPHIL NFR BLD AUTO: 2.6 % — SIGNIFICANT CHANGE UP (ref 0–8)
GLUCOSE BLDC GLUCOMTR-MCNC: 134 MG/DL — HIGH (ref 70–99)
GLUCOSE BLDC GLUCOMTR-MCNC: 145 MG/DL — HIGH (ref 70–99)
GLUCOSE BLDC GLUCOMTR-MCNC: 182 MG/DL — HIGH (ref 70–99)
GLUCOSE SERPL-MCNC: 116 MG/DL — HIGH (ref 70–99)
HCT VFR BLD CALC: 28.2 % — LOW (ref 42–52)
HGB BLD-MCNC: 9 G/DL — LOW (ref 14–18)
IMM GRANULOCYTES NFR BLD AUTO: 0.4 % — HIGH (ref 0.1–0.3)
LYMPHOCYTES # BLD AUTO: 1.64 K/UL — SIGNIFICANT CHANGE UP (ref 1.2–3.4)
LYMPHOCYTES # BLD AUTO: 20.6 % — SIGNIFICANT CHANGE UP (ref 20.5–51.1)
MAGNESIUM SERPL-MCNC: 1.9 MG/DL — SIGNIFICANT CHANGE UP (ref 1.8–2.4)
MCHC RBC-ENTMCNC: 28.2 PG — SIGNIFICANT CHANGE UP (ref 27–31)
MCHC RBC-ENTMCNC: 31.9 G/DL — LOW (ref 32–37)
MCV RBC AUTO: 88.4 FL — SIGNIFICANT CHANGE UP (ref 80–94)
MONOCYTES # BLD AUTO: 0.68 K/UL — HIGH (ref 0.1–0.6)
MONOCYTES NFR BLD AUTO: 8.5 % — SIGNIFICANT CHANGE UP (ref 1.7–9.3)
NEUTROPHILS # BLD AUTO: 5.38 K/UL — SIGNIFICANT CHANGE UP (ref 1.4–6.5)
NEUTROPHILS NFR BLD AUTO: 67.5 % — SIGNIFICANT CHANGE UP (ref 42.2–75.2)
NRBC # BLD: 0 /100 WBCS — SIGNIFICANT CHANGE UP (ref 0–0)
PHOSPHATE SERPL-MCNC: 3.9 MG/DL — SIGNIFICANT CHANGE UP (ref 2.1–4.9)
PLATELET # BLD AUTO: 354 K/UL — SIGNIFICANT CHANGE UP (ref 130–400)
POTASSIUM SERPL-MCNC: 4.5 MMOL/L — SIGNIFICANT CHANGE UP (ref 3.5–5)
POTASSIUM SERPL-SCNC: 4.5 MMOL/L — SIGNIFICANT CHANGE UP (ref 3.5–5)
RBC # BLD: 3.19 M/UL — LOW (ref 4.7–6.1)
RBC # FLD: 11.9 % — SIGNIFICANT CHANGE UP (ref 11.5–14.5)
SODIUM SERPL-SCNC: 145 MMOL/L — SIGNIFICANT CHANGE UP (ref 135–146)
SPECIMEN SOURCE: SIGNIFICANT CHANGE UP
WBC # BLD: 7.97 K/UL — SIGNIFICANT CHANGE UP (ref 4.8–10.8)
WBC # FLD AUTO: 7.97 K/UL — SIGNIFICANT CHANGE UP (ref 4.8–10.8)

## 2018-09-21 RX ORDER — SODIUM BICARBONATE 1 MEQ/ML
1 SYRINGE (ML) INTRAVENOUS
Qty: 0 | Refills: 0 | COMMUNITY
Start: 2018-09-21

## 2018-09-21 RX ADMIN — Medication 650 MILLIGRAM(S): at 13:14

## 2018-09-21 RX ADMIN — Medication 650 MILLIGRAM(S): at 06:05

## 2018-09-21 RX ADMIN — HEPARIN SODIUM 5000 UNIT(S): 5000 INJECTION INTRAVENOUS; SUBCUTANEOUS at 06:05

## 2018-09-21 RX ADMIN — HEPARIN SODIUM 5000 UNIT(S): 5000 INJECTION INTRAVENOUS; SUBCUTANEOUS at 13:14

## 2018-09-21 RX ADMIN — Medication 81 MILLIGRAM(S): at 13:14

## 2018-09-21 RX ADMIN — AMLODIPINE BESYLATE 5 MILLIGRAM(S): 2.5 TABLET ORAL at 06:05

## 2018-09-21 RX ADMIN — MEMANTINE HYDROCHLORIDE 10 MILLIGRAM(S): 10 TABLET ORAL at 06:05

## 2018-09-21 NOTE — PROGRESS NOTE ADULT - REASON FOR ADMISSION
Fever and oliguria

## 2018-09-21 NOTE — PROGRESS NOTE ADULT - ASSESSMENT
Timbo Jose is a 83M who was BIBA for fever, suprapubic discomfort, and dysuria.    NISHANT on CKD, postobstructive uropathy (BPH), improving   - Cr 6.5 -> 3.9 -> 2.9 -> 2.3 -> 1.9 -> 1.7  -> 1.5 today  - maintain de leon catheter  - PSA level 34  - Urology recs: keep catheter in for minimum of 2 weeks (from 9/14/18)  - decompressive hematuria is expected - irrigate PRN  -  Flomax 0.4mg with dinner  - started on Bicarb    SIRS (fever, tachycardia), resolved   - urine cx negative  - Blood cx negative   - ID f/u noted  - antibiotics discontinued    Dementia  - Donepezil 10mg PO qhs     DM2  - FS and insulin if FS >180 	    HTN  - Amlodipine 5mg PO daily   - Off ACE and Lasix    DLD  - Atorvastatin 40mg PO qhs     Normocytic Anemia  - Hgb stable  - Ferrous sulfate 325mg PO daily      Hyperkalemia, resolved    Paget's Disease on CT scan and confirmed with bonescan  - ALK phos noted    DVT PPX: Heparin SQ  Ambulate: OOBTC  Diet: Renal, DASH TLC  DNR/DNI (MOLST in chart), from [facility] , Discharge planning, pending auth. Peer to peer discussion scheduled for today at 12pm

## 2018-09-21 NOTE — PROGRESS NOTE ADULT - PROVIDER SPECIALTY LIST ADULT
Infectious Disease
Internal Medicine
Nephrology
Internal Medicine
Nephrology

## 2018-09-21 NOTE — PROGRESS NOTE ADULT - SUBJECTIVE AND OBJECTIVE BOX
YAZAN WALKER  83y  Male  HPI:  anemia, dementia, DLD, HTN, CKD, BPH, DM2 presented for fever and lower abdominal pain for 1 day duration.  Patient is a resident at [facility], was discharged on 9/11 from our hospital after being treated for NISHANT 2/2 dehydration and medications. He was sent in for fever Tmax 100.5, with suprapubic pain and dysuria. Patient is a poor historian and hx was provided partly by NH chart. Stat labs and a CXR were obtained in NH which showed a rise in Cr from 1.4 on 9/11 to 5.5 and he had no urine output, and CXR showed L lung infiltrate vs effusion so was BIBEMS for evaluation. In ED coude catheter was inserted and he had 1700 cc output of maroon urine. Patient felt instant relief of abdominal pain. He had no hypotension, syncope, back pain, chest pain, palpitations.    Tried to contact Daughter many times could not get through. (15 Sep 2018 03:05)    MEDICATIONS  (STANDING):  amLODIPine   Tablet 5 milliGRAM(s) Oral daily  aspirin enteric coated 81 milliGRAM(s) Oral daily  atorvastatin 40 milliGRAM(s) Oral at bedtime  donepezil 10 milliGRAM(s) Oral at bedtime  heparin  Injectable 5000 Unit(s) SubCutaneous every 8 hours  memantine 10 milliGRAM(s) Oral two times a day  sodium bicarbonate 650 milliGRAM(s) Oral every 8 hours  tamsulosin 0.4 milliGRAM(s) Oral at bedtime    MEDICATIONS  (PRN):  acetaminophen  Suppository .. 650 milliGRAM(s) Rectal every 6 hours PRN Temp greater or equal to 38.5C (101.3F)    INTERVAL EVENTS: Patient seen today without distress, now hungry. Patient aware he is for discharge today.    T(C): 36.9 (09-21-18 @ 05:48), Max: 36.9 (09-21-18 @ 05:48)  HR: 77 (09-21-18 @ 05:48) (71 - 77)  BP: 128/64 (09-21-18 @ 05:48) (119/62 - 128/64)  RR: 18 (09-21-18 @ 05:48) (18 - 18)  SpO2: --  Wt(kg): --Vital Signs Last 24 Hrs  T(C): 36.9 (21 Sep 2018 05:48), Max: 36.9 (21 Sep 2018 05:48)  T(F): 98.4 (21 Sep 2018 05:48), Max: 98.4 (21 Sep 2018 05:48)  HR: 77 (21 Sep 2018 05:48) (71 - 77)  BP: 128/64 (21 Sep 2018 05:48) (119/62 - 128/64)  BP(mean): --  RR: 18 (21 Sep 2018 05:48) (18 - 18)  SpO2: --    PHYSICAL EXAM:  GENERAL: NAD  NECK: Supple, No JVD  CHEST/LUNG: Clear  HEART: S1, S2, Regular rate and rhythm;   ABDOMEN: Soft, Nontender, Nondistended; Bowel sounds present, de leon in place  EXTREMITIES: Trace edema    LABS:  Labs:                        9.0    7.97  )-----------( 354      ( 21 Sep 2018 06:32 )             28.2             09-21    145  |  108  |  25<H>  ----------------------------<  116<H>  4.5   |  25  |  1.5    Ca    9.3      21 Sep 2018 06:32  Phos  3.9     09-21  Mg     1.9     09-21                    RADIOLOGY & ADDITIONAL TESTS:

## 2018-09-26 DIAGNOSIS — D64.9 ANEMIA, UNSPECIFIED: ICD-10-CM

## 2018-09-26 DIAGNOSIS — N18.3 CHRONIC KIDNEY DISEASE, STAGE 3 (MODERATE): ICD-10-CM

## 2018-09-26 DIAGNOSIS — R33.8 OTHER RETENTION OF URINE: ICD-10-CM

## 2018-09-26 DIAGNOSIS — N39.0 URINARY TRACT INFECTION, SITE NOT SPECIFIED: ICD-10-CM

## 2018-09-26 DIAGNOSIS — E83.42 HYPOMAGNESEMIA: ICD-10-CM

## 2018-09-26 DIAGNOSIS — R97.20 ELEVATED PROSTATE SPECIFIC ANTIGEN [PSA]: ICD-10-CM

## 2018-09-26 DIAGNOSIS — R50.9 FEVER, UNSPECIFIED: ICD-10-CM

## 2018-09-26 DIAGNOSIS — I12.9 HYPERTENSIVE CHRONIC KIDNEY DISEASE WITH STAGE 1 THROUGH STAGE 4 CHRONIC KIDNEY DISEASE, OR UNSPECIFIED CHRONIC KIDNEY DISEASE: ICD-10-CM

## 2018-09-26 DIAGNOSIS — F03.90 UNSPECIFIED DEMENTIA WITHOUT BEHAVIORAL DISTURBANCE: ICD-10-CM

## 2018-09-26 DIAGNOSIS — N13.8 OTHER OBSTRUCTIVE AND REFLUX UROPATHY: ICD-10-CM

## 2018-09-26 DIAGNOSIS — E11.65 TYPE 2 DIABETES MELLITUS WITH HYPERGLYCEMIA: ICD-10-CM

## 2018-09-26 DIAGNOSIS — E87.1 HYPO-OSMOLALITY AND HYPONATREMIA: ICD-10-CM

## 2018-09-26 DIAGNOSIS — I25.10 ATHEROSCLEROTIC HEART DISEASE OF NATIVE CORONARY ARTERY WITHOUT ANGINA PECTORIS: ICD-10-CM

## 2018-09-26 DIAGNOSIS — E11.22 TYPE 2 DIABETES MELLITUS WITH DIABETIC CHRONIC KIDNEY DISEASE: ICD-10-CM

## 2018-09-26 DIAGNOSIS — R31.9 HEMATURIA, UNSPECIFIED: ICD-10-CM

## 2018-09-26 DIAGNOSIS — M88.1: ICD-10-CM

## 2018-09-26 DIAGNOSIS — E87.5 HYPERKALEMIA: ICD-10-CM

## 2018-09-26 DIAGNOSIS — N13.9 OBSTRUCTIVE AND REFLUX UROPATHY, UNSPECIFIED: ICD-10-CM

## 2018-09-26 DIAGNOSIS — Z23 ENCOUNTER FOR IMMUNIZATION: ICD-10-CM

## 2018-09-26 DIAGNOSIS — E87.2 ACIDOSIS: ICD-10-CM

## 2018-09-26 DIAGNOSIS — N17.9 ACUTE KIDNEY FAILURE, UNSPECIFIED: ICD-10-CM

## 2018-09-26 DIAGNOSIS — N40.1 BENIGN PROSTATIC HYPERPLASIA WITH LOWER URINARY TRACT SYMPTOMS: ICD-10-CM

## 2018-10-01 ENCOUNTER — APPOINTMENT (OUTPATIENT)
Dept: UROLOGY | Facility: CLINIC | Age: 83
End: 2018-10-01

## 2018-10-11 ENCOUNTER — INPATIENT (INPATIENT)
Facility: HOSPITAL | Age: 83
LOS: 12 days | Discharge: NURSING, REHAB OR LONG-TERM CARE | End: 2018-10-24
Attending: INTERNAL MEDICINE | Admitting: INTERNAL MEDICINE
Payer: MEDICARE

## 2018-10-11 ENCOUNTER — APPOINTMENT (OUTPATIENT)
Dept: UROLOGY | Facility: CLINIC | Age: 83
End: 2018-10-11
Payer: MEDICARE

## 2018-10-11 VITALS
RESPIRATION RATE: 16 BRPM | TEMPERATURE: 98 F | DIASTOLIC BLOOD PRESSURE: 58 MMHG | SYSTOLIC BLOOD PRESSURE: 120 MMHG | HEART RATE: 90 BPM | OXYGEN SATURATION: 100 %

## 2018-10-11 VITALS — SYSTOLIC BLOOD PRESSURE: 109 MMHG | DIASTOLIC BLOOD PRESSURE: 60 MMHG | HEART RATE: 81 BPM

## 2018-10-11 DIAGNOSIS — E11.22 TYPE 2 DIABETES MELLITUS WITH DIABETIC CHRONIC KIDNEY DISEASE: ICD-10-CM

## 2018-10-11 DIAGNOSIS — I10 ESSENTIAL (PRIMARY) HYPERTENSION: ICD-10-CM

## 2018-10-11 DIAGNOSIS — D64.9 ANEMIA, UNSPECIFIED: ICD-10-CM

## 2018-10-11 DIAGNOSIS — R33.9 RETENTION OF URINE, UNSPECIFIED: ICD-10-CM

## 2018-10-11 DIAGNOSIS — E11.9 TYPE 2 DIABETES MELLITUS WITHOUT COMPLICATIONS: ICD-10-CM

## 2018-10-11 DIAGNOSIS — N10 ACUTE PYELONEPHRITIS: ICD-10-CM

## 2018-10-11 DIAGNOSIS — Z87.891 PERSONAL HISTORY OF NICOTINE DEPENDENCE: ICD-10-CM

## 2018-10-11 DIAGNOSIS — F01.50 VASCULAR DEMENTIA W/OUT BEHAVIORAL DISTURBANCE: ICD-10-CM

## 2018-10-11 DIAGNOSIS — N13.6 PYONEPHROSIS: ICD-10-CM

## 2018-10-11 DIAGNOSIS — E11.21 TYPE 2 DIABETES MELLITUS WITH DIABETIC NEPHROPATHY: ICD-10-CM

## 2018-10-11 DIAGNOSIS — R31.9 HEMATURIA, UNSPECIFIED: ICD-10-CM

## 2018-10-11 DIAGNOSIS — B95.2 ENTEROCOCCUS AS THE CAUSE OF DISEASES CLASSIFIED ELSEWHERE: ICD-10-CM

## 2018-10-11 DIAGNOSIS — F03.90 UNSPECIFIED DEMENTIA WITHOUT BEHAVIORAL DISTURBANCE: ICD-10-CM

## 2018-10-11 DIAGNOSIS — N18.9 CHRONIC KIDNEY DISEASE, UNSPECIFIED: ICD-10-CM

## 2018-10-11 DIAGNOSIS — N40.0 BENIGN PROSTATIC HYPERPLASIA WITHOUT LOWER URINARY TRACT SYMPMS: ICD-10-CM

## 2018-10-11 DIAGNOSIS — G93.41 METABOLIC ENCEPHALOPATHY: ICD-10-CM

## 2018-10-11 DIAGNOSIS — N40.1 BENIGN PROSTATIC HYPERPLASIA WITH LOWER URINARY TRACT SYMPTOMS: ICD-10-CM

## 2018-10-11 DIAGNOSIS — D62 ACUTE POSTHEMORRHAGIC ANEMIA: ICD-10-CM

## 2018-10-11 DIAGNOSIS — E87.0 HYPEROSMOLALITY AND HYPERNATREMIA: ICD-10-CM

## 2018-10-11 DIAGNOSIS — E11.9 TYPE 2 DIABETES MELLITUS W/OUT COMPLICATIONS: ICD-10-CM

## 2018-10-11 DIAGNOSIS — Z78.9 OTHER SPECIFIED HEALTH STATUS: ICD-10-CM

## 2018-10-11 DIAGNOSIS — Z79.4 LONG TERM (CURRENT) USE OF INSULIN: ICD-10-CM

## 2018-10-11 DIAGNOSIS — A41.9 SEPSIS, UNSPECIFIED ORGANISM: ICD-10-CM

## 2018-10-11 DIAGNOSIS — N17.9 ACUTE KIDNEY FAILURE, UNSPECIFIED: ICD-10-CM

## 2018-10-11 DIAGNOSIS — B96.89 OTHER SPECIFIED BACTERIAL AGENTS AS THE CAUSE OF DISEASES CLASSIFIED ELSEWHERE: ICD-10-CM

## 2018-10-11 DIAGNOSIS — R31.0 GROSS HEMATURIA: ICD-10-CM

## 2018-10-11 DIAGNOSIS — R33.8 OTHER RETENTION OF URINE: ICD-10-CM

## 2018-10-11 DIAGNOSIS — E78.5 HYPERLIPIDEMIA, UNSPECIFIED: ICD-10-CM

## 2018-10-11 DIAGNOSIS — Z98.890 OTHER SPECIFIED POSTPROCEDURAL STATES: Chronic | ICD-10-CM

## 2018-10-11 DIAGNOSIS — I12.9 HYPERTENSIVE CHRONIC KIDNEY DISEASE WITH STAGE 1 THROUGH STAGE 4 CHRONIC KIDNEY DISEASE, OR UNSPECIFIED CHRONIC KIDNEY DISEASE: ICD-10-CM

## 2018-10-11 LAB
ALBUMIN SERPL ELPH-MCNC: 3.4 G/DL — LOW (ref 3.5–5.2)
ALP SERPL-CCNC: 134 U/L — HIGH (ref 30–115)
ALT FLD-CCNC: 12 U/L — SIGNIFICANT CHANGE UP (ref 0–41)
ANION GAP SERPL CALC-SCNC: 17 MMOL/L — HIGH (ref 7–14)
APPEARANCE UR: ABNORMAL
AST SERPL-CCNC: 29 U/L — SIGNIFICANT CHANGE UP (ref 0–41)
BASE EXCESS BLDV CALC-SCNC: 3.6 MMOL/L — HIGH (ref -2–2)
BASOPHILS # BLD AUTO: 0.03 K/UL — SIGNIFICANT CHANGE UP (ref 0–0.2)
BASOPHILS NFR BLD AUTO: 0.3 % — SIGNIFICANT CHANGE UP (ref 0–1)
BILIRUB SERPL-MCNC: 0.3 MG/DL — SIGNIFICANT CHANGE UP (ref 0.2–1.2)
BILIRUB UR-MCNC: NEGATIVE — SIGNIFICANT CHANGE UP
BUN SERPL-MCNC: 23 MG/DL — HIGH (ref 10–20)
CA-I SERPL-SCNC: 1.23 MMOL/L — SIGNIFICANT CHANGE UP (ref 1.12–1.3)
CALCIUM SERPL-MCNC: 9.4 MG/DL — SIGNIFICANT CHANGE UP (ref 8.5–10.1)
CHLORIDE SERPL-SCNC: 102 MMOL/L — SIGNIFICANT CHANGE UP (ref 98–110)
CO2 SERPL-SCNC: 23 MMOL/L — SIGNIFICANT CHANGE UP (ref 17–32)
COLOR SPEC: YELLOW — SIGNIFICANT CHANGE UP
CREAT SERPL-MCNC: 1.7 MG/DL — HIGH (ref 0.7–1.5)
DIFF PNL FLD: ABNORMAL
EOSINOPHIL # BLD AUTO: 0.12 K/UL — SIGNIFICANT CHANGE UP (ref 0–0.7)
EOSINOPHIL NFR BLD AUTO: 1.2 % — SIGNIFICANT CHANGE UP (ref 0–8)
GAS PNL BLDV: 143 MMOL/L — SIGNIFICANT CHANGE UP (ref 136–145)
GAS PNL BLDV: SIGNIFICANT CHANGE UP
GLUCOSE BLDC GLUCOMTR-MCNC: 262 MG/DL — HIGH (ref 70–99)
GLUCOSE SERPL-MCNC: 167 MG/DL — HIGH (ref 70–99)
GLUCOSE UR QL: NEGATIVE MG/DL — SIGNIFICANT CHANGE UP
HCO3 BLDV-SCNC: 30 MMOL/L — HIGH (ref 22–29)
HCT VFR BLD CALC: 30.4 % — LOW (ref 42–52)
HCT VFR BLDA CALC: 44.2 % — HIGH (ref 34–44)
HGB BLD CALC-MCNC: 14.4 G/DL — SIGNIFICANT CHANGE UP (ref 14–18)
HGB BLD-MCNC: 9.4 G/DL — LOW (ref 14–18)
IMM GRANULOCYTES NFR BLD AUTO: 0.4 % — HIGH (ref 0.1–0.3)
KETONES UR-MCNC: NEGATIVE — SIGNIFICANT CHANGE UP
LACTATE BLDV-MCNC: 1.8 MMOL/L — HIGH (ref 0.5–1.6)
LACTATE SERPL-SCNC: 1.6 MMOL/L — SIGNIFICANT CHANGE UP (ref 0.5–2.2)
LEUKOCYTE ESTERASE UR-ACNC: ABNORMAL
LYMPHOCYTES # BLD AUTO: 1.89 K/UL — SIGNIFICANT CHANGE UP (ref 1.2–3.4)
LYMPHOCYTES # BLD AUTO: 19 % — LOW (ref 20.5–51.1)
MCHC RBC-ENTMCNC: 27.8 PG — SIGNIFICANT CHANGE UP (ref 27–31)
MCHC RBC-ENTMCNC: 30.9 G/DL — LOW (ref 32–37)
MCV RBC AUTO: 89.9 FL — SIGNIFICANT CHANGE UP (ref 80–94)
MONOCYTES # BLD AUTO: 0.9 K/UL — HIGH (ref 0.1–0.6)
MONOCYTES NFR BLD AUTO: 9 % — SIGNIFICANT CHANGE UP (ref 1.7–9.3)
NEUTROPHILS # BLD AUTO: 6.97 K/UL — HIGH (ref 1.4–6.5)
NEUTROPHILS NFR BLD AUTO: 70.1 % — SIGNIFICANT CHANGE UP (ref 42.2–75.2)
NITRITE UR-MCNC: NEGATIVE — SIGNIFICANT CHANGE UP
NRBC # BLD: 0 /100 WBCS — SIGNIFICANT CHANGE UP (ref 0–0)
PCO2 BLDV: 54 MMHG — HIGH (ref 41–51)
PH BLDV: 7.36 — SIGNIFICANT CHANGE UP (ref 7.26–7.43)
PH UR: 7.5 — SIGNIFICANT CHANGE UP (ref 5–8)
PLATELET # BLD AUTO: 264 K/UL — SIGNIFICANT CHANGE UP (ref 130–400)
PO2 BLDV: 20 MMHG — SIGNIFICANT CHANGE UP (ref 20–40)
POTASSIUM BLDV-SCNC: 4.2 MMOL/L — SIGNIFICANT CHANGE UP (ref 3.3–5.6)
POTASSIUM SERPL-MCNC: 5.9 MMOL/L — HIGH (ref 3.5–5)
POTASSIUM SERPL-SCNC: 5.9 MMOL/L — HIGH (ref 3.5–5)
PROT SERPL-MCNC: 7.1 G/DL — SIGNIFICANT CHANGE UP (ref 6–8)
PROT UR-MCNC: >=300 MG/DL
RBC # BLD: 3.38 M/UL — LOW (ref 4.7–6.1)
RBC # FLD: 11.9 % — SIGNIFICANT CHANGE UP (ref 11.5–14.5)
RBC CASTS # UR COMP ASSIST: >50 /HPF
SAO2 % BLDV: 21 % — SIGNIFICANT CHANGE UP
SODIUM SERPL-SCNC: 142 MMOL/L — SIGNIFICANT CHANGE UP (ref 135–146)
SP GR SPEC: 1.02 — SIGNIFICANT CHANGE UP (ref 1.01–1.03)
UROBILINOGEN FLD QL: 1 MG/DL (ref 0.2–0.2)
WBC # BLD: 9.95 K/UL — SIGNIFICANT CHANGE UP (ref 4.8–10.8)
WBC # FLD AUTO: 9.95 K/UL — SIGNIFICANT CHANGE UP (ref 4.8–10.8)

## 2018-10-11 PROCEDURE — 99213 OFFICE O/P EST LOW 20 MIN: CPT

## 2018-10-11 RX ORDER — ATORVASTATIN CALCIUM 80 MG/1
40 TABLET, FILM COATED ORAL AT BEDTIME
Qty: 0 | Refills: 0 | Status: DISCONTINUED | OUTPATIENT
Start: 2018-10-11 | End: 2018-10-24

## 2018-10-11 RX ORDER — AMLODIPINE BESYLATE 2.5 MG/1
5 TABLET ORAL DAILY
Qty: 0 | Refills: 0 | Status: DISCONTINUED | OUTPATIENT
Start: 2018-10-11 | End: 2018-10-24

## 2018-10-11 RX ORDER — TAMSULOSIN HYDROCHLORIDE 0.4 MG/1
0.4 CAPSULE ORAL AT BEDTIME
Qty: 0 | Refills: 0 | Status: DISCONTINUED | OUTPATIENT
Start: 2018-10-11 | End: 2018-10-24

## 2018-10-11 RX ORDER — TAMSULOSIN HYDROCHLORIDE 0.4 MG/1
0.4 CAPSULE ORAL
Refills: 0 | Status: ACTIVE | COMMUNITY

## 2018-10-11 RX ORDER — CHLORHEXIDINE GLUCONATE 4 %
325 (65 FE) LIQUID (ML) TOPICAL
Refills: 0 | Status: ACTIVE | COMMUNITY

## 2018-10-11 RX ORDER — SODIUM CHLORIDE 9 MG/ML
1000 INJECTION INTRAMUSCULAR; INTRAVENOUS; SUBCUTANEOUS
Qty: 0 | Refills: 0 | Status: DISCONTINUED | OUTPATIENT
Start: 2018-10-11 | End: 2018-10-16

## 2018-10-11 RX ORDER — MEMANTINE HYDROCHLORIDE 10 MG/1
10 TABLET ORAL
Qty: 0 | Refills: 0 | Status: DISCONTINUED | OUTPATIENT
Start: 2018-10-11 | End: 2018-10-24

## 2018-10-11 RX ORDER — ATORVASTATIN CALCIUM 40 MG/1
40 TABLET, FILM COATED ORAL
Refills: 0 | Status: ACTIVE | COMMUNITY

## 2018-10-11 RX ORDER — TOBRAMYCIN AND DEXAMETHASONE 3; 1 MG/ML; MG/ML
0.3-0.1 SUSPENSION/ DROPS OPHTHALMIC
Refills: 0 | Status: ACTIVE | COMMUNITY

## 2018-10-11 RX ORDER — ACETAMINOPHEN 500 MG
650 TABLET ORAL EVERY 6 HOURS
Qty: 0 | Refills: 0 | Status: DISCONTINUED | OUTPATIENT
Start: 2018-10-11 | End: 2018-10-24

## 2018-10-11 RX ORDER — MEMANTINE HYDROCHLORIDE 10 MG/1
10 TABLET ORAL
Refills: 0 | Status: ACTIVE | COMMUNITY

## 2018-10-11 RX ORDER — DONEPEZIL HYDROCHLORIDE 10 MG/1
10 TABLET ORAL
Refills: 0 | Status: ACTIVE | COMMUNITY

## 2018-10-11 RX ORDER — SODIUM BICARBONATE 650 MG/1
650 TABLET ORAL
Refills: 0 | Status: ACTIVE | COMMUNITY

## 2018-10-11 RX ORDER — ASPIRIN 325 MG/1
TABLET, FILM COATED ORAL
Refills: 0 | Status: ACTIVE | COMMUNITY

## 2018-10-11 RX ORDER — AMLODIPINE BESYLATE 5 MG/1
5 TABLET ORAL
Refills: 0 | Status: ACTIVE | COMMUNITY

## 2018-10-11 RX ORDER — ASPIRIN/CALCIUM CARB/MAGNESIUM 324 MG
81 TABLET ORAL DAILY
Qty: 0 | Refills: 0 | Status: DISCONTINUED | OUTPATIENT
Start: 2018-10-11 | End: 2018-10-12

## 2018-10-11 RX ORDER — DONEPEZIL HYDROCHLORIDE 10 MG/1
10 TABLET, FILM COATED ORAL AT BEDTIME
Qty: 0 | Refills: 0 | Status: DISCONTINUED | OUTPATIENT
Start: 2018-10-11 | End: 2018-10-24

## 2018-10-11 RX ORDER — FERROUS SULFATE 325(65) MG
325 TABLET ORAL THREE TIMES A DAY
Qty: 0 | Refills: 0 | Status: DISCONTINUED | OUTPATIENT
Start: 2018-10-11 | End: 2018-10-24

## 2018-10-11 RX ADMIN — Medication 650 MILLIGRAM(S): at 23:18

## 2018-10-11 RX ADMIN — SODIUM CHLORIDE 100 MILLILITER(S): 9 INJECTION INTRAMUSCULAR; INTRAVENOUS; SUBCUTANEOUS at 22:30

## 2018-10-11 RX ADMIN — ATORVASTATIN CALCIUM 40 MILLIGRAM(S): 80 TABLET, FILM COATED ORAL at 23:18

## 2018-10-11 RX ADMIN — DONEPEZIL HYDROCHLORIDE 10 MILLIGRAM(S): 10 TABLET, FILM COATED ORAL at 23:19

## 2018-10-11 RX ADMIN — TAMSULOSIN HYDROCHLORIDE 0.4 MILLIGRAM(S): 0.4 CAPSULE ORAL at 23:18

## 2018-10-11 RX ADMIN — Medication 325 MILLIGRAM(S): at 23:19

## 2018-10-11 RX ADMIN — SODIUM CHLORIDE 100 MILLILITER(S): 9 INJECTION INTRAMUSCULAR; INTRAVENOUS; SUBCUTANEOUS at 15:38

## 2018-10-11 NOTE — ED PROVIDER NOTE - MEDICAL DECISION MAKING DETAILS
pt with hematuria in de leon, clots when uro came to eval, placed cbi de leon but only irrigated, recommended aadmission for observaton of hematuria and rpt h/h

## 2018-10-11 NOTE — H&P ADULT - NSHPREVIEWOFSYSTEMS_GEN_ALL_CORE
REVIEW OF SYSTEMS:    CONSTITUTIONAL: No weakness, fevers or chills  EYES/ENT: No visual changes;  No vertigo or throat pain   NECK: No pain or stiffness  RESPIRATORY: No cough, wheezing, hemoptysis; No shortness of breath  CARDIOVASCULAR: No chest pain or palpitations  GASTROINTESTINAL: No abdominal or epigastric pain. No nausea, vomiting, or hematemesis; No diarrhea or constipation. No melena or hematochezia.  GENITOURINARY: gross  hematuria  NEUROLOGICAL: No numbness or weakness  SKIN: No itching, rashes

## 2018-10-11 NOTE — H&P ADULT - NSHPPOAURINARYCATHETER_GEN_ALL_CORE
[facility]   [address], [facility] [address]  [place] [address]  Phone: [phone]  Fax: [phone]              Brooklynn Jain  1957    Encounter Date: 6/8/2018    Jairo Castillo M.D.          PROGRESS NOTE:  Chief Complaint   Patient presents with   • HTN (Controlled)     follow up       Subjective:   Brooklynn Jain is a 61 y.o. female who presents today for follow-up of her history of pacemaker with paroxysmal atrial fibrillation on pacer checks    She is doing well no major issues in the past 6 months still has A. fib less than an hour on pacer checks    Past Medical History:   Diagnosis Date   • Atrial fibrillation (HCC)    • Cardiac pacemaker in situ 5/30/2012 6/7/2006: Dual chamber Medtronic impulse DR model, #E2DR01, serial #[id number] implanted by Dr. Jaydon Wyatt.    • Conduction disorder, unspecified CARDIAC CONDUCTION SYSTEM DISEASE, STATUS POST DUAL CHAMBER PACEMAKER.   • Other and unspecified hyperlipidemia    • Other chronic nonalcoholic liver disease    • Signs and symptoms involving emotional state    • Type II or unspecified type diabetes mellitus without mention of complication, not stated as uncontrolled    • Unspecified essential hypertension    • Unspecified sleep apnea      Past Surgical History:   Procedure Laterality Date   • APPENDECTOMY     • CHOLECYSTECTOMY     • TONSILLECTOMY AND ADENOIDECTOMY       No family history on file.  Social History     Social History   • Marital status:      Spouse name: N/A   • Number of children: N/A   • Years of education: N/A     Occupational History   • Not on file.     Social History Main Topics   • Smoking status: Never Smoker   • Smokeless tobacco: Never Used   • Alcohol use No   • Drug use: Unknown   • Sexual activity: Yes     Other Topics Concern   • Not on file     Social History Narrative   • No narrative on file     No Known Allergies  Outpatient Encounter Prescriptions as of  "6/8/2018   Medication Sig Dispense Refill   • losartan-hydrochlorothiazide (HYZAAR) 100-12.5 MG per tablet Take 1 Tab by mouth every day.     • simvastatin (ZOCOR) 40 MG Tab Take 40 mg by mouth every evening.     • Multiple Vitamins-Minerals (MULTI FOR HER PO) Take  by mouth.     • B COMPLEX-C PO Take  by mouth.     • labetalol (NORMODYNE) 100 MG TABS TAKE ONE TABLET ORALLY ONCE DAILY (Patient taking differently: TAKE ONE TABLET ORALLY 2x daily) 90 Tab 2   • metformin (GLUCOPHAGE) 500 MG TABS Take 500 mg by mouth 2 times a day.     • alprazolam (XANAX) 0.25 MG TABS Take 1 Tab by mouth 3 times a day as needed for Sleep. f 90 Tab 0   • aspirin buffered (ASCRIPTIN) 325 MG TABS Take 1 Tab by mouth every day.       No facility-administered encounter medications on file as of 6/8/2018.      Review of Systems   Constitutional: Negative for chills and fever.   HENT: Negative for sore throat.    Eyes: Negative for blurred vision.   Respiratory: Negative for cough and shortness of breath.    Cardiovascular: Negative for chest pain, palpitations, claudication, leg swelling and PND.   Gastrointestinal: Negative for abdominal pain and nausea.   Musculoskeletal: Negative for falls and joint pain.   Skin: Negative for rash.   Neurological: Negative for dizziness, focal weakness and weakness.   Endo/Heme/Allergies: Does not bruise/bleed easily.        Objective:   /80   Pulse 88   Ht 1.651 m (5' 5\")   Wt 76.2 kg (168 lb)   SpO2 94%   BMI 27.96 kg/m²      Physical Exam   Constitutional: No distress.   HENT:   Mouth/Throat: Oropharynx is clear and moist. No oropharyngeal exudate.   Eyes: No scleral icterus.   Neck: No JVD present.   Cardiovascular: Normal rate and normal heart sounds.  Exam reveals no gallop and no friction rub.    No murmur heard.  Pulmonary/Chest: No respiratory distress. She has no wheezes. She has no rales.   Abdominal: Soft. Bowel sounds are normal.   Musculoskeletal: She exhibits no edema.   "   Neurological: She is alert.   Skin: No rash noted. She is not diaphoretic.   Psychiatric: She has a normal mood and affect.       Assessment:     1. Paroxysmal atrial fibrillation (HCC)     2. Cardiac pacemaker in situ         Medical Decision Making:  Today's Assessment / Status / Plan:     It was my pleasure to meet with Ms. Jain.    She follows closely with primary care  Again she consider long-term anticoagulation  Which in any case would be recommended certainly at age 65    She will discuss with primary care as well    I will see Ms. Jain back in 6-12 months time and encouraged her to follow up with us over the phone or e-mail using my MyChart as issues arise.    It is my pleasure to participate in the care of Ms. Jain.  Please do not hesitate to contact me with questions or concerns.    Jairo Castillo MD PhD [facility]  Cardiologist [facility]        Robb Veliz M.D.  [address] [address]  VIA Facsimile: [phone]                  no

## 2018-10-11 NOTE — ED PROVIDER NOTE - PROGRESS NOTE DETAILS
spoke to urology, who checked with dr. rangel.  they want renal/bladder US and possible irrigation v. cbi.  will check de leon size. uro will be coming down to see pt seen by urology, 3 way 22 fr de leon placed, was able to evacuate some large clots, draining now.  advised to have pt admitted to medicine to observe hematuria.  possible cbi if clots return. attempted to call wife and daughter's contact numbers.  no answer.  left message.  as per transporter, she spoke to the [facility] and they had been unable to contact his family as well. spoke to dr. pedroza, accepts admission.  says ok to admit to [facility].  spoke to urology team, will let [facility] uro team know that pt will be coming. signed out to medicine pa at [facility]

## 2018-10-11 NOTE — H&P ADULT - ASSESSMENT
83y old  Male who was sent to ED by Dr. Lange after noticing his de leon bag that he had placed on last admission here was filled with dark blood and clots. Pt was seen on previous admission by urology for urinary retention and NISHANT which resolved after placing de leon which drained 1700cc on insertion. Pt was due for routine de leon cath change today. Pt is poor historian and it is unclear when the hematuria started.

## 2018-10-11 NOTE — CONSULT NOTE ADULT - ASSESSMENT
82 y/o male with urinary retention and gross hematuria  - maintain foely cath, irrigate prn  - obtain CBC, CMP, transfuse prn  - obtain UA  - IV fluid hydration  - IV abx  - Hold ASA if not contraindicated.   - balanitis care  - discussed with Dr. Reynolds

## 2018-10-11 NOTE — ED ADULT NURSE NOTE - NSIMPLEMENTINTERV_GEN_ALL_ED
Implemented All Fall Risk Interventions:  [place] to call system. Call bell, personal items and telephone within reach. Instruct patient to call for assistance. Room bathroom lighting operational. Non-slip footwear when patient is off stretcher. Physically safe environment: no spills, clutter or unnecessary equipment. Stretcher in lowest position, wheels locked, appropriate side rails in place. Provide visual cue, wrist band, yellow gown, etc. Monitor gait and stability. Monitor for mental status changes and reorient to person, place, and time. Review medications for side effects contributing to fall risk. Reinforce activity limits and safety measures with patient and family.

## 2018-10-11 NOTE — H&P ADULT - NSHPLABSRESULTS_GEN_ALL_CORE
9.4    9.95  )-----------( 264      ( 11 Oct 2018 11:48 )             30.4       10-11    142  |  102  |  23<H>  ----------------------------<  167<H>  5.9<H>   |  23  |  1.7<H>    Ca    9.4      11 Oct 2018 11:48    TPro  7.1  /  Alb  3.4<L>  /  TBili  0.3  /  DBili  x   /  AST  29  /  ALT  12  /  AlkPhos  134<H>  10-11              Urinalysis Basic - ( 11 Oct 2018 15:00 )    Color: Yellow / Appearance: Cloudy / S.025 / pH: x  Gluc: x / Ketone: Negative  / Bili: Negative / Urobili: 1.0 mg/dL   Blood: x / Protein: >=300 mg/dL / Nitrite: Negative   Leuk Esterase: Moderate / RBC: >50 /HPF / WBC x   Sq Epi: x / Non Sq Epi: x / Bacteria: x            Lactate Trend  10-11 @ 11:48 Lactate:1.6             CAPILLARY BLOOD GLUCOSE            Culture Results:   No growth (09-15 @ 00:03)  Culture Results:   Growth in anaerobic bottle: Gram Positive Rods  **Please note**upon prolonged incubation unable to identify Gram positive  rods.  If further identification is necessary please contact the laboratory  directly. ( @ 23:30)

## 2018-10-11 NOTE — CONSULT NOTE ADULT - ATTENDING COMMENTS
I have seen and evaluated the patient  he needs to be optimized for possible surgical intervention   the patient has persistent hematuria  etiology - unknown  he will require cystoscopy but only once he is cleared for such intervention to be performed with general anesthesia

## 2018-10-11 NOTE — H&P ADULT - NSHPPHYSICALEXAM_GEN_ALL_CORE
GENERAL:  82y/o Male NAD, resting comfortably.  HEAD:  Atraumatic, Normocephalic  EYES: EOMI, PERRLA, conjunctiva and sclera clear  NECK: Supple, No JVD, no cervical lymphadenopathy, non-tender  CHEST/LUNG: Clear to auscultation bilaterally; No wheeze, rhonchi, or rales  HEART: Regular rate and rhythm; S1&S2  ABDOMEN: Soft, Nontender, Nondistended x 4 quadrants; Bowel sounds present   . de leon to gravity gross hem with dark urine ,draining well  EXTREMITIES:   Peripheral Pulses Present, No clubbing, no cyanosis, or no edema, no calf tenderness  PSYCH: AAOx3, cooperative, appropriate  NEUROLOGY: WNL  SKIN: WNL

## 2018-10-11 NOTE — CONSULT NOTE ADULT - SUBJECTIVE AND OBJECTIVE BOX
Patient is a 83y old  Male who was sent to ED by Dr. Lange after noticing his de leon bag that he had placed on last admission here was filled with dark blood and clots. Pt was seen on previous admission by urology for urinary retention and NISHANT which resolved after placing de leon which drained 1700cc on insertion. Pt was due for routine de leon cath change today. Pt is poor historian and it is unclear when the hematuria started.     Replaced de leon cath with a 22fr 3way cath and irrigated with 750cc sterile water. Drained 75-100cc of clots. Urine cleared to light pink and draining well. de leon was placed under sterile technique and pt tolerated it well without any complications. Balloon inflated to 10 cc.     HPI:      PAST MEDICAL & SURGICAL HISTORY:  Dementia  Kidney disease  DM (diabetes mellitus)  HTN (hypertension)  H/O knee surgery: right knee          MEDICATIONS  (STANDING):  sodium chloride 0.9%. 1000 milliLiter(s) (100 mL/Hr) IV Continuous <Continuous>    MEDICATIONS  (PRN):      Allergies    No Known Allergies    Intolerances        SOCIAL HISTORY: No illicit drug use    FAMILY HISTORY:  No pertinent family history in first degree relatives      Vital Signs Last 24 Hrs  T(C): 36.4 (11 Oct 2018 11:36), Max: 36.4 (11 Oct 2018 11:36)  T(F): 97.6 (11 Oct 2018 11:36), Max: 97.6 (11 Oct 2018 11:36)  HR: 90 (11 Oct 2018 11:36) (90 - 90)  BP: 120/58 (11 Oct 2018 11:36) (120/58 - 120/58)  BP(mean): --  RR: 16 (11 Oct 2018 11:36) (16 - 16)  SpO2: 100% (11 Oct 2018 11:36) (100% - 100%)    PHYSICAL EXAM:    Constitutional: NAD  HEENT: EOMI  Neck: no pain  Back: No CVA tenderness b/l  Abd: firm distended lower abdomen which became softer after de leon replacement and irrigation.   : + de leon cath draining clear pink urine. +phimosis   Extremities: no edema  Neurological: A/O x 3    I&O's Summary      LABS:              Urine Culture:         RADIOLOGY & ADDITIONAL STUDIES:

## 2018-10-11 NOTE — ED PROVIDER NOTE - NS ED ROS FT
Review of Systems:  	•	CONSTITUTIONAL - no fever, no diaphoresis, no weight change  	•	SKIN - no rash  	•	HEMATOLOGIC - no bleeding, no bruising  	•	EYES - no eye pain, no blurred vision  	•	ENT - no change in hearing, no pain  	•	RESPIRATORY - no shortness of breath, no cough  	•	CARDIAC - no chest pain, no palpitations  	•	GI - no abd pain, no nausea, no vomiting, no diarrhea, no constipation, no bleeding  	•	 - no dysuria, + hematuria, no flank pain, no urinary retention, de leon in place  	•	MUSCULOSKELETAL - no joint paint, no swelling, no redness  	•	NEUROLOGIC - no weakness, no headache, no paresthesias, no dizziness  All other systems negative, unless specified in HPI

## 2018-10-11 NOTE — ED PROVIDER NOTE - PHYSICAL EXAMINATION
VITAL SIGNS: I have reviewed nursing notes and confirm.  CONSTITUTIONAL: Well-developed; well-nourished; in no acute distress.  SKIN: Skin exam is warm and dry, no acute rash.  HEAD: Normocephalic; atraumatic.  EYES: PERRL, EOM intact; conjunctiva and sclera clear.  ENT: No nasal discharge; airway clear. TMs clear.  NECK: Supple; non tender.  CARD: S1, S2 normal; no murmurs, gallops, or rubs. Regular rate and rhythm.  RESP: No wheezes, rales or rhonchi.  ABD: Normal bowel sounds; soft; non-distended; non-tender;  :  de leon in place, with gross hematuria  EXT: Normal ROM. No clubbing, cyanosis or edema.

## 2018-10-11 NOTE — ED PROVIDER NOTE - OBJECTIVE STATEMENT
82 yo f with pmh of dementia, htn, kidney disease?, bph, sent by dr. rangel for hematuria, bladder imaging and irrigation.  pt is poor historian.  pt is only accompanied currently by the transporter that was supposed to bring pt to dr. rangel' office from [facility].  as per transporter, pt was noted to have blood in his de leon bag and was told to transport to ED.  pt has no complaints.  as per noted from dr. rangel, unable to ascertain how long the hematuria has been going on.  handwritten note on pt's transport papers say "cbc and cbi".

## 2018-10-12 LAB
ALLERGY+IMMUNOLOGY DIAG STUDY NOTE: SIGNIFICANT CHANGE UP
ANION GAP SERPL CALC-SCNC: 13 MMOL/L — SIGNIFICANT CHANGE UP (ref 7–14)
APTT BLD: 22.4 SEC — CRITICAL LOW (ref 27–39.2)
BUN SERPL-MCNC: 34 MG/DL — HIGH (ref 10–20)
CALCIUM SERPL-MCNC: 8.1 MG/DL — LOW (ref 8.5–10.1)
CHLORIDE SERPL-SCNC: 106 MMOL/L — SIGNIFICANT CHANGE UP (ref 98–110)
CO2 SERPL-SCNC: 22 MMOL/L — SIGNIFICANT CHANGE UP (ref 17–32)
CREAT SERPL-MCNC: 2.5 MG/DL — HIGH (ref 0.7–1.5)
GLUCOSE BLDC GLUCOMTR-MCNC: 207 MG/DL — HIGH (ref 70–99)
GLUCOSE BLDC GLUCOMTR-MCNC: 211 MG/DL — HIGH (ref 70–99)
GLUCOSE BLDC GLUCOMTR-MCNC: 273 MG/DL — HIGH (ref 70–99)
GLUCOSE BLDC GLUCOMTR-MCNC: 299 MG/DL — HIGH (ref 70–99)
GLUCOSE SERPL-MCNC: 304 MG/DL — HIGH (ref 70–99)
HCT VFR BLD CALC: 22.9 % — LOW (ref 42–52)
HCT VFR BLD CALC: 24.2 % — LOW (ref 42–52)
HGB BLD-MCNC: 7.6 G/DL — LOW (ref 14–18)
HGB BLD-MCNC: 7.8 G/DL — LOW (ref 14–18)
INR BLD: 1.54 RATIO — HIGH (ref 0.65–1.3)
MCHC RBC-ENTMCNC: 29.1 PG — SIGNIFICANT CHANGE UP (ref 27–31)
MCHC RBC-ENTMCNC: 29.5 PG — SIGNIFICANT CHANGE UP (ref 27–31)
MCHC RBC-ENTMCNC: 32.2 G/DL — SIGNIFICANT CHANGE UP (ref 32–37)
MCHC RBC-ENTMCNC: 33.2 G/DL — SIGNIFICANT CHANGE UP (ref 32–37)
MCV RBC AUTO: 88.8 FL — SIGNIFICANT CHANGE UP (ref 80–94)
MCV RBC AUTO: 90.3 FL — SIGNIFICANT CHANGE UP (ref 80–94)
NRBC # BLD: 0 /100 WBCS — SIGNIFICANT CHANGE UP (ref 0–0)
NRBC # BLD: 0 /100 WBCS — SIGNIFICANT CHANGE UP (ref 0–0)
PLATELET # BLD AUTO: 235 K/UL — SIGNIFICANT CHANGE UP (ref 130–400)
PLATELET # BLD AUTO: 263 K/UL — SIGNIFICANT CHANGE UP (ref 130–400)
POTASSIUM SERPL-MCNC: 4.9 MMOL/L — SIGNIFICANT CHANGE UP (ref 3.5–5)
POTASSIUM SERPL-SCNC: 4.9 MMOL/L — SIGNIFICANT CHANGE UP (ref 3.5–5)
PROTHROM AB SERPL-ACNC: 16.8 SEC — HIGH (ref 9.95–12.87)
RBC # BLD: 2.58 M/UL — LOW (ref 4.7–6.1)
RBC # BLD: 2.68 M/UL — LOW (ref 4.7–6.1)
RBC # FLD: 11.9 % — SIGNIFICANT CHANGE UP (ref 11.5–14.5)
RBC # FLD: 12.1 % — SIGNIFICANT CHANGE UP (ref 11.5–14.5)
SODIUM SERPL-SCNC: 141 MMOL/L — SIGNIFICANT CHANGE UP (ref 135–146)
TYPE + AB SCN PNL BLD: SIGNIFICANT CHANGE UP
WBC # BLD: 17.45 K/UL — HIGH (ref 4.8–10.8)
WBC # BLD: 23.01 K/UL — HIGH (ref 4.8–10.8)
WBC # FLD AUTO: 17.45 K/UL — HIGH (ref 4.8–10.8)
WBC # FLD AUTO: 23.01 K/UL — HIGH (ref 4.8–10.8)

## 2018-10-12 RX ORDER — SODIUM CHLORIDE 9 MG/ML
1000 INJECTION, SOLUTION INTRAVENOUS
Qty: 0 | Refills: 0 | Status: DISCONTINUED | OUTPATIENT
Start: 2018-10-12 | End: 2018-10-24

## 2018-10-12 RX ORDER — GLUCAGON INJECTION, SOLUTION 0.5 MG/.1ML
1 INJECTION, SOLUTION SUBCUTANEOUS ONCE
Qty: 0 | Refills: 0 | Status: DISCONTINUED | OUTPATIENT
Start: 2018-10-12 | End: 2018-10-24

## 2018-10-12 RX ORDER — DEXTROSE 50 % IN WATER 50 %
25 SYRINGE (ML) INTRAVENOUS ONCE
Qty: 0 | Refills: 0 | Status: DISCONTINUED | OUTPATIENT
Start: 2018-10-12 | End: 2018-10-24

## 2018-10-12 RX ORDER — DEXTROSE 50 % IN WATER 50 %
15 SYRINGE (ML) INTRAVENOUS ONCE
Qty: 0 | Refills: 0 | Status: DISCONTINUED | OUTPATIENT
Start: 2018-10-12 | End: 2018-10-24

## 2018-10-12 RX ORDER — CIPROFLOXACIN LACTATE 400MG/40ML
400 VIAL (ML) INTRAVENOUS DAILY
Qty: 0 | Refills: 0 | Status: DISCONTINUED | OUTPATIENT
Start: 2018-10-13 | End: 2018-10-13

## 2018-10-12 RX ORDER — INSULIN LISPRO 100/ML
VIAL (ML) SUBCUTANEOUS
Qty: 0 | Refills: 0 | Status: DISCONTINUED | OUTPATIENT
Start: 2018-10-12 | End: 2018-10-24

## 2018-10-12 RX ORDER — CEFTRIAXONE 500 MG/1
1 INJECTION, POWDER, FOR SOLUTION INTRAMUSCULAR; INTRAVENOUS ONCE
Qty: 0 | Refills: 0 | Status: COMPLETED | OUTPATIENT
Start: 2018-10-12 | End: 2018-10-12

## 2018-10-12 RX ORDER — CIPROFLOXACIN LACTATE 400MG/40ML
400 VIAL (ML) INTRAVENOUS ONCE
Qty: 0 | Refills: 0 | Status: COMPLETED | OUTPATIENT
Start: 2018-10-12 | End: 2018-10-12

## 2018-10-12 RX ORDER — DEXTROSE 50 % IN WATER 50 %
12.5 SYRINGE (ML) INTRAVENOUS ONCE
Qty: 0 | Refills: 0 | Status: DISCONTINUED | OUTPATIENT
Start: 2018-10-12 | End: 2018-10-24

## 2018-10-12 RX ORDER — CEFTRIAXONE 500 MG/1
1 INJECTION, POWDER, FOR SOLUTION INTRAMUSCULAR; INTRAVENOUS EVERY 24 HOURS
Qty: 0 | Refills: 0 | Status: DISCONTINUED | OUTPATIENT
Start: 2018-10-12 | End: 2018-10-13

## 2018-10-12 RX ORDER — CIPROFLOXACIN LACTATE 400MG/40ML
VIAL (ML) INTRAVENOUS
Qty: 0 | Refills: 0 | Status: DISCONTINUED | OUTPATIENT
Start: 2018-10-12 | End: 2018-10-13

## 2018-10-12 RX ORDER — INSULIN LISPRO 100/ML
2 VIAL (ML) SUBCUTANEOUS
Qty: 0 | Refills: 0 | Status: DISCONTINUED | OUTPATIENT
Start: 2018-10-12 | End: 2018-10-24

## 2018-10-12 RX ADMIN — Medication 2: at 12:06

## 2018-10-12 RX ADMIN — Medication 2 UNIT(S): at 12:07

## 2018-10-12 RX ADMIN — MEMANTINE HYDROCHLORIDE 10 MILLIGRAM(S): 10 TABLET ORAL at 18:46

## 2018-10-12 RX ADMIN — Medication 325 MILLIGRAM(S): at 21:22

## 2018-10-12 RX ADMIN — MEMANTINE HYDROCHLORIDE 10 MILLIGRAM(S): 10 TABLET ORAL at 06:59

## 2018-10-12 RX ADMIN — Medication 200 MILLIGRAM(S): at 20:06

## 2018-10-12 RX ADMIN — Medication 650 MILLIGRAM(S): at 22:15

## 2018-10-12 RX ADMIN — Medication 2 UNIT(S): at 17:18

## 2018-10-12 RX ADMIN — Medication 325 MILLIGRAM(S): at 15:04

## 2018-10-12 RX ADMIN — Medication 650 MILLIGRAM(S): at 15:04

## 2018-10-12 RX ADMIN — Medication 650 MILLIGRAM(S): at 15:34

## 2018-10-12 RX ADMIN — TAMSULOSIN HYDROCHLORIDE 0.4 MILLIGRAM(S): 0.4 CAPSULE ORAL at 21:23

## 2018-10-12 RX ADMIN — AMLODIPINE BESYLATE 5 MILLIGRAM(S): 2.5 TABLET ORAL at 06:58

## 2018-10-12 RX ADMIN — Medication 650 MILLIGRAM(S): at 20:29

## 2018-10-12 RX ADMIN — Medication 325 MILLIGRAM(S): at 06:58

## 2018-10-12 RX ADMIN — Medication 3: at 17:18

## 2018-10-12 RX ADMIN — DONEPEZIL HYDROCHLORIDE 10 MILLIGRAM(S): 10 TABLET, FILM COATED ORAL at 21:22

## 2018-10-12 RX ADMIN — CEFTRIAXONE 100 GRAM(S): 500 INJECTION, POWDER, FOR SOLUTION INTRAMUSCULAR; INTRAVENOUS at 15:04

## 2018-10-12 RX ADMIN — ATORVASTATIN CALCIUM 40 MILLIGRAM(S): 80 TABLET, FILM COATED ORAL at 21:22

## 2018-10-12 NOTE — CONSULT NOTE ADULT - SUBJECTIVE AND OBJECTIVE BOX
HPI: 83y old  Male who was sent to ED by Dr. Lange after noticing his de leon bag that he had placed on last admission here was filled with dark blood and clots. Pt was seen on previous admission by urology for urinary retention and A K I which resolved after placing de leon which drained 1700cc on insertion. Ptn  was due for routine de leon  cath change today. Pt is poor historian and it is unclear when the hematuria started.     PTN  REFERRED TO ACUTE  REHAB  FOR  EVAL AND  TX   PAST MEDICAL & SURGICAL HISTORY:  Anemia  Dementia  Kidney disease  DM (diabetes mellitus)  HTN (hypertension)  H/O knee surgery: right knee      Hospital Course:    TODAY'S SUBJECTIVE & REVIEW OF SYMPTOMS:     Constitutional WNL   Cardio WNL   Resp WNL   GI WNL  Heme WNL  Endo WNL  Skin WNL  MSK WNL  Neuro WNL  Cognitive WNL  Psych WNL      MEDICATIONS  (STANDING):  amLODIPine   Tablet 5 milliGRAM(s) Oral daily  atorvastatin 40 milliGRAM(s) Oral at bedtime  cefTRIAXone   IVPB 1 Gram(s) IV Intermittent every 24 hours  ciprofloxacin   IVPB 400 milliGRAM(s) IV Intermittent once  ciprofloxacin   IVPB      dextrose 5%. 1000 milliLiter(s) (50 mL/Hr) IV Continuous <Continuous>  dextrose 50% Injectable 12.5 Gram(s) IV Push once  dextrose 50% Injectable 25 Gram(s) IV Push once  dextrose 50% Injectable 25 Gram(s) IV Push once  donepezil 10 milliGRAM(s) Oral at bedtime  ferrous    sulfate 325 milliGRAM(s) Oral three times a day  insulin lispro (HumaLOG) corrective regimen sliding scale   SubCutaneous three times a day before meals  insulin lispro Injectable (HumaLOG) 2 Unit(s) SubCutaneous three times a day before meals  memantine 10 milliGRAM(s) Oral two times a day  sodium chloride 0.9%. 1000 milliLiter(s) (100 mL/Hr) IV Continuous <Continuous>  tamsulosin 0.4 milliGRAM(s) Oral at bedtime    MEDICATIONS  (PRN):  acetaminophen   Tablet .. 650 milliGRAM(s) Oral every 6 hours PRN Temp greater or equal to 38C (100.4F), Mild Pain (1 - 3)  dextrose 40% Gel 15 Gram(s) Oral once PRN Blood Glucose LESS THAN 70 milliGRAM(s)/deciliter  glucagon  Injectable 1 milliGRAM(s) IntraMuscular once PRN Glucose LESS THAN 70 milligrams/deciliter      FAMILY HISTORY:  No pertinent family history in first degree relatives      Allergies    No Known Allergies    Intolerances        SOCIAL HISTORY:    [  ] Etoh  [  ] Smoking  [  ] Substance abuse     Home Environment:  [  ] Home Alone  [ x ] Lives with Family  [  ] Home Health Aid    Dwelling:  [  ] Apartment  [ x ] Private House  [  ] Adult Home  [  ] Skilled Nursing Facility      [  ] Short Term  [  ] Long Term  [ x ] Stairs       Elevator [  ]    FUNCTIONAL STATUS PTA: (Check all that apply)  Ambulation: [ xx  ]Independent    [  ] Dependent     [  ] Non-Ambulatory  Assistive Device: [  ] SA Cane  [  ]  Q Cane  [  x] Walker  [  ]  Wheelchair  ADL : [ x] Independent  [  ]  Dependent       Vital Signs Last 24 Hrs  T(C): 37.8 (12 Oct 2018 16:49), Max: 38.4 (12 Oct 2018 14:00)  T(F): 100.1 (12 Oct 2018 16:49), Max: 101.2 (12 Oct 2018 14:00)  HR: 116 (12 Oct 2018 14:00) (102 - 116)  BP: 123/58 (12 Oct 2018 14:00) (120/65 - 126/61)  BP(mean): --  RR: 16 (12 Oct 2018 14:00) (16 - 16)  SpO2: --      PHYSICAL EXAM: Alert & Oriented X 2  GENERAL: NAD, well-groomed, well-developed  HEAD:  Atraumatic, Normocephalic  EYES: EOMI, PERRLA, conjunctiva and sclera clear  NECK: Supple, No JVD, Normal thyroid  CHEST/LUNG: Clear to percussion bilaterally; No rales, rhonchi, wheezing, or rubs  HEART: Regular rate and rhythm; No murmurs, rubs, or gallops  ABDOMEN: Soft, Nontender, Nondistended; Bowel sounds present  EXTREMITIES:  2+ Peripheral Pulses, No clubbing, cyanosis, or edema    NERVOUS SYSTEM:  Cranial Nerves 2-12 intact [ x ] Abnormal  [  ]  ROM: WFL all extremities [x  ]  Abnormal [  ]  Motor Strength: WFL all extremities  [  ]  Abnormal [ x ]  Sensation: intact to light touch [  ] Abnormal [x  ]  Reflexes: Symmetric [  ]  Abnormal [ x ]    FUNCTIONAL STATUS:  Bed Mobility: Independent [  ]  Supervision [  ]  Needs Assistance [ x ]  N/A [  ]  Transfers: Independent [  ]  Supervision [  ]  Needs Assistance [ x ]  N/A [  ]   Ambulation: Independent [  ]  Supervision [  ]  Needs Assistance [x ]  N/A [  ]  ADL: Independent [ x ] Requires Assistance [  ] N/A [  ]      LABS:                        7.6    23.01 )-----------( 235      ( 12 Oct 2018 15:57 )             22.9     10-    141  |  106  |  34<H>  ----------------------------<  304<H>  4.9   |  22  |  2.5<H>    Ca    8.1<L>      12 Oct 2018 15:57    TPro  7.1  /  Alb  3.4<L>  /  TBili  0.3  /  DBili  x   /  AST  29  /  ALT  12  /  AlkPhos  134<H>  10    PT/INR - ( 12 Oct 2018 15:57 )   PT: 16.80 sec;   INR: 1.54 ratio         PTT - ( 12 Oct 2018 15:57 )  PTT:22.4 sec  Urinalysis Basic - ( 11 Oct 2018 15:00 )    Color: Yellow / Appearance: Cloudy / S.025 / pH: x  Gluc: x / Ketone: Negative  / Bili: Negative / Urobili: 1.0 mg/dL   Blood: x / Protein: >=300 mg/dL / Nitrite: Negative   Leuk Esterase: Moderate / RBC: >50 /HPF / WBC x   Sq Epi: x / Non Sq Epi: x / Bacteria: x        RADIOLOGY & ADDITIONAL STUDIES:    Assesment:

## 2018-10-12 NOTE — PATIENT PROFILE ADULT - ARE ANY OF THE ITEMS ON THE CHART
[facility]   Brief Operative Note     Surgery Date: 2017    Pre-op Diagnosis:  1. Intrauterine pregnancy at 38w3d     2. 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 Same      2. Liveborn female infant     Procedure:  Primary low-transverse  section with double layer uterine closure via Pfannenstiel incision    Surgeon:  Dr. Valentin  Assistants:  Sandra Martel MD MPH, PGY-3    Maritza Raymundo, MS3    Anesthesia: Spinal, TAP blocks    EBL:  1000 mL  IVF:  1200 mL crystalloid  UOP:  125 mL clear urine at the end of the case  Drains: Bradshaw Catheter     Findings:   1. Liveborn female infant in ROP presentation delivered at 1846 on 2017. Apgars 8 at 1 minute & 9 at 5 minutes. Weight 2466g.  2.  Venous cord pH 7.35, base deficit 0.6.  3. Clear amniotic fluid  4. Placenta small but intact and with 3-vessel cord.   5. No intrabdominal adhesions.  No fascial adhesions.    6. Normal uterus with arcuate appearance, fallopian tubes, and ovaries.     Specimens:  cord segment, cord blood    Complications: None apparent    Disposition:  Transferred in stable condition to PACU    Sandra Martel MD, MPH  PGY3, OB/GYN  Pager: [phone]  2017  7:34 PM      
no

## 2018-10-12 NOTE — PROGRESS NOTE ADULT - PROBLEM SELECTOR PLAN 1
groo hematueria  flush prn  gu f/u  cbc drawn  hem low below 8  transfuse prbc 1 u  follow up cbc post transfusion  consider cbi/ gu follow up

## 2018-10-12 NOTE — PROGRESS NOTE ADULT - SUBJECTIVE AND OBJECTIVE BOX
Call to evaluate this patient for grss hematuria.83y old  Male who was sent to ED by Dr. Lange after noticing his de leon bag that he had placed on last admission here was filled with dark blood and clots. Pt was seen on previous admission by urology for urinary retention and NISHANT which resolved after placing de leon which drained 1700cc on insertion.

## 2018-10-12 NOTE — CHART NOTE - NSCHARTNOTEFT_GEN_A_CORE
Labs reviewed and d/w attending.  Give 1 more unit PRBC's now and repeat CBC in am.  Since still febrile and with increased WBC from last draw will continue with Rocephin and add Cipro 400mg IVPB q24h.  Will order ID c/s.

## 2018-10-12 NOTE — PROGRESS NOTE ADULT - SUBJECTIVE AND OBJECTIVE BOX
OVERNIGHT EVENTS:     PT NEEDED IRRIGATION AND EVAC> OF CLOTS.  PT BEING TRANSFUSE 2u of PRBC         Patient is a 83y old  Male who was sent to ED by Dr. Lange after noticing his de leon bag that he had placed on last admission here was filled with dark blood and clots. Pt was seen on previous admission by urology for urinary retention and NISHANT which resolved after placing de leon which drained 1700cc on insertion. Pt was due for routine de leon cath change today. Pt is poor historian and it is unclear when the hematuria started.     Replaced de leon cath with a 22fr 3way cath and irrigated with 750cc sterile water. Drained 75-100cc of clots. Urine cleared to light pink and draining well. de leon was placed under sterile technique and pt tolerated it well without any complications. Balloon inflated to 10 cc.     HPI:      PAST MEDICAL & SURGICAL HISTORY:  Dementia  Kidney disease  DM (diabetes mellitus)  HTN (hypertension)  H/O knee surgery: right knee          MEDICATIONS  (STANDING):  sodium chloride 0.9%. 1000 milliLiter(s) (100 mL/Hr) IV Continuous <Continuous>    MEDICATIONS  (PRN):      Allergies    No Known Allergies    Intolerances        SOCIAL HISTORY: No illicit drug use    FAMILY HISTORY:  No pertinent family history in first degree relatives      Vital Signs Last 24 Hrs  T(C): 36.8 (12 Oct 2018 06:01), Max: 38.3 (11 Oct 2018 22:32)  T(F): 98.2 (12 Oct 2018 06:01), Max: 101 (11 Oct 2018 22:32)  HR: 102 (12 Oct 2018 06:01) (90 - 111)  BP: 120/65 (12 Oct 2018 06:01) (120/58 - 146/68)  BP(mean): --  RR: 16 (12 Oct 2018 06:01) (16 - 17)  SpO2: 95% (11 Oct 2018 15:21) (95% - 100%)    PHYSICAL EXAM:    Constitutional: NAD  HEENT: EOMI  Neck: no pain  Back: No CVA tenderness b/l  Abd: firm distended lower abdomen which became softer after de leon replacement and irrigation.   : + de leon cath draining clear hematuric urine. +phimosis   Extremities: no edema  Neurological: A/O x 3    I&O's Summary      LABS:              Urine Culture:         RADIOLOGY & ADDITIONAL STUDIES:

## 2018-10-12 NOTE — PROGRESS NOTE ADULT - SUBJECTIVE AND OBJECTIVE BOX
YAZAN WALKER  83y  Male      Patient is a 83y old  Male who presents with a chief complaint of urinary retention (12 Oct 2018 08:13)    gross hematuria on de leon,s/p PRBC    REVIEW OF SYSTEMS:  CONSTITUTIONAL: No fever, weight loss, or fatigue  EYES: No eye pain, visual disturbances, or discharge  ENMT:  No difficulty hearing, tinnitus, vertigo; No sinus or throat pain  NECK: No pain or stiffness  BREASTS: No pain, masses, or nipple discharge  RESPIRATORY: No cough, wheezing, chills or hemoptysis; No shortness of breath  CARDIOVASCULAR: No chest pain, palpitations, dizziness, or leg swelling  GASTROINTESTINAL: No abdominal or epigastric pain. No nausea, vomiting, or hematemesis; No diarrhea or constipation. No melena or hematochezia.  GENITOURINARY: No dysuria, frequency, hematuria++  NEUROLOGICAL: No headaches, memory loss, loss of strength, numbness, or tremors  SKIN: No itching, burning, rashes, or lesions   LYMPH NODES: No enlarged glands  ENDOCRINE: No heat or cold intolerance; No hair loss  MUSCULOSKELETAL: No joint pain or swelling; No muscle, back, or extremity pain  PSYCHIATRIC: No depression, anxiety, mood swings, or difficulty sleeping  HEME/LYMPH: No easy bruising, or bleeding gums  ALLERY AND IMMUNOLOGIC: No hives or eczema  FAMILY HISTORY:  No pertinent family history in first degree relatives    T(C): 36.8 (10-12-18 @ 06:01), Max: 38.3 (10-11-18 @ 22:32)  HR: 102 (10-12-18 @ 06:01) (90 - 111)  BP: 120/65 (10-12-18 @ 06:01) (120/58 - 146/68)  RR: 16 (10-12-18 @ 06:01) (16 - 17)  SpO2: 95% (10-11-18 @ 15:21) (95% - 100%)  Wt(kg): --Vital Signs Last 24 Hrs  T(C): 36.8 (12 Oct 2018 06:01), Max: 38.3 (11 Oct 2018 22:32)  T(F): 98.2 (12 Oct 2018 06:01), Max: 101 (11 Oct 2018 22:32)  HR: 102 (12 Oct 2018 06:01) (90 - 111)  BP: 120/65 (12 Oct 2018 06:01) (120/58 - 146/68)  BP(mean): --  RR: 16 (12 Oct 2018 06:01) (16 - 17)  SpO2: 95% (11 Oct 2018 15:21) (95% - 100%)  No Known Allergies      PHYSICAL EXAM:  GENERAL: NAD,   HEAD:  Atraumatic, Normocephalic  EYES: EOMI, PERRLA, conjunctiva and sclera clear  ENMT: No tonsillar erythema, exudates, or enlargement;   NECK: Supple, No JVD, Normal thyroid  NERVOUS SYSTEM:  Alert & Oriented X3  CHEST/LUNG: Clear to percussion bilaterally; No rales, rhonchi, wheezing, or rubs  HEART: Regular rate and rhythm; No murmurs, rubs, or gallops  ABDOMEN: Soft, Nontender, Nondistended; Bowel sounds present  EXTREMITIES:  2+ Peripheral Pulses, No clubbing, cyanosis, or edema  LYMPH: No lymphadenopathy noted  SKIN: No rashes or lesions      LABS:  10-11    142  |  102  |  23<H>  ----------------------------<  167<H>  5.9<H>   |  23  |  1.7<H>    Ca    9.4      11 Oct 2018 11:48    TPro  7.1  /  Alb  3.4<L>  /  TBili  0.3  /  DBili  x   /  AST  29  /  ALT  12  /  AlkPhos  134<H>  10-11                          7.8    17.45 )-----------( 263      ( 12 Oct 2018 02:15 )             24.2         RADIOLOGY & ADDITIONAL TESTS:    MEDICATION:  acetaminophen   Tablet .. 650 milliGRAM(s) Oral every 6 hours PRN  amLODIPine   Tablet 5 milliGRAM(s) Oral daily  atorvastatin 40 milliGRAM(s) Oral at bedtime  cefTRIAXone   IVPB 1 Gram(s) IV Intermittent every 24 hours  donepezil 10 milliGRAM(s) Oral at bedtime  ferrous    sulfate 325 milliGRAM(s) Oral three times a day  memantine 10 milliGRAM(s) Oral two times a day  sodium chloride 0.9%. 1000 milliLiter(s) IV Continuous <Continuous>  tamsulosin 0.4 milliGRAM(s) Oral at bedtime      HEALTH ISSUES - PROBLEM Dx:  Dementia: Dementia  DM (diabetes mellitus): DM (diabetes mellitus)  HTN (hypertension): HTN (hypertension)  Anemia: Anemia due to acute blood loss gross hematuria,s/p prbc  Urinary retention: Urinary retention on de leon catheter  Hematuria: Hematuria,urology follow up, d/c asa  UTI start on rocephin nephrology consult

## 2018-10-12 NOTE — PATIENT PROFILE ADULT - INFORMATION COULD NOT BE OBTAINED DETAILS
Pt is poor historian, called [facility] s/w Andrea, awaiting [employer] forms to be faxed over

## 2018-10-12 NOTE — CONSULT NOTE ADULT - ASSESSMENT
IMPRESSION: Rehab of 82 y/o  m  ptn  rehab  for  debility  gd      PRECAUTIONS: [  ] Cardiac  [  ] Respiratory  [  ] Seizures [  ] Contact Isolation  [  ] Droplet Isolation  [ de leon  fall  ] Other    Weight Bearing Status:     RECOMMENDATION:    Out of Bed to Chair     DVT/Decubiti Prophylaxis    REHAB PLAN:     [  x] Bedside P/T 3-5 times a week   [   ]   Bedside O/T  2-3 times a week             [   ] No Rehab Therapy Indicated                   [   ]  Speech Therapy   Conditioning/ROM                                    ADL  Bed Mobility                                               Conditioning/ROM  Transfers                                                     Bed Mobility  Sitting /Standing Balance                         Transfers                                        Gait Training                                               Sitting/Standing Balance  Stair Training [   ]Applicable                    Home equipment Eval                                                                        Splinting  [   ] Only      GOALS:   ADL   [ x  ]   Independent                    Transfers  [ x  ] Independent                          Ambulation  [   x] Independent     [  x  ] With device                            [   ]  CG                                                         [   ]  CG                                                                  [   ] CG                            [    ] Min A                                                   [   ] Min A                                                              [   ] Min  A          DISCHARGE PLAN:   [   ]  Good candidate for Intensive Rehabilitation/Hospital based-[facility]A [facility]                                             Will tolerate 3hrs Intensive Rehab Daily                                       [   xx ]  Short Term Rehab in Skilled Nursing Facility                                       [    ]  Home with Outpatient or VN services                                         [    ]  Possible Candidate for Intensive Hospital based Rehab

## 2018-10-12 NOTE — PROGRESS NOTE ADULT - ASSESSMENT
84 y/o male with urinary retention and gross hematuria  - maintain foely cath, irrigate prn  - obtain CBC, CMP, transfuse prn  - obtain UA  - IV fluid hydration  - IV abx  - Hold ASA if not contraindicated.   - balanitis care  - discussed with Dr. Reynolds   NO CBI AT THIS TIME

## 2018-10-12 NOTE — PROGRESS NOTE ADULT - ASSESSMENT
82 y/o male with bea blood in de leon catheter . de leon was flushed and multiple clots came out. but draining well now.

## 2018-10-13 LAB
ANION GAP SERPL CALC-SCNC: 15 MMOL/L — HIGH (ref 7–14)
APPEARANCE UR: SIGNIFICANT CHANGE UP
BACTERIA # UR AUTO: ABNORMAL
BILIRUB UR-MCNC: ABNORMAL
BUN SERPL-MCNC: 46 MG/DL — HIGH (ref 10–20)
CALCIUM SERPL-MCNC: 8.1 MG/DL — LOW (ref 8.5–10.1)
CHLORIDE SERPL-SCNC: 104 MMOL/L — SIGNIFICANT CHANGE UP (ref 98–110)
CO2 SERPL-SCNC: 22 MMOL/L — SIGNIFICANT CHANGE UP (ref 17–32)
COLOR SPEC: ABNORMAL
CREAT SERPL-MCNC: 2.6 MG/DL — HIGH (ref 0.7–1.5)
DIFF PNL FLD: ABNORMAL
GLUCOSE BLDC GLUCOMTR-MCNC: 176 MG/DL — HIGH (ref 70–99)
GLUCOSE BLDC GLUCOMTR-MCNC: 203 MG/DL — HIGH (ref 70–99)
GLUCOSE BLDC GLUCOMTR-MCNC: 225 MG/DL — HIGH (ref 70–99)
GLUCOSE BLDC GLUCOMTR-MCNC: 280 MG/DL — HIGH (ref 70–99)
GLUCOSE SERPL-MCNC: 217 MG/DL — HIGH (ref 70–99)
GLUCOSE UR QL: 100 MG/DL
HCT VFR BLD CALC: 22 % — LOW (ref 42–52)
HCT VFR BLD CALC: 23.9 % — LOW (ref 42–52)
HGB BLD-MCNC: 7.2 G/DL — CRITICAL LOW (ref 14–18)
HGB BLD-MCNC: 7.6 G/DL — LOW (ref 14–18)
KETONES UR-MCNC: 40
LEUKOCYTE ESTERASE UR-ACNC: SIGNIFICANT CHANGE UP
MCHC RBC-ENTMCNC: 28.6 PG — SIGNIFICANT CHANGE UP (ref 27–31)
MCHC RBC-ENTMCNC: 29.5 PG — SIGNIFICANT CHANGE UP (ref 27–31)
MCHC RBC-ENTMCNC: 31.8 G/DL — LOW (ref 32–37)
MCHC RBC-ENTMCNC: 32.7 G/DL — SIGNIFICANT CHANGE UP (ref 32–37)
MCV RBC AUTO: 89.8 FL — SIGNIFICANT CHANGE UP (ref 80–94)
MCV RBC AUTO: 90.2 FL — SIGNIFICANT CHANGE UP (ref 80–94)
NITRITE UR-MCNC: POSITIVE
NRBC # BLD: 0 /100 WBCS — SIGNIFICANT CHANGE UP (ref 0–0)
NRBC # BLD: 0 /100 WBCS — SIGNIFICANT CHANGE UP (ref 0–0)
PH UR: 8.5 — SIGNIFICANT CHANGE UP (ref 5–8)
PLATELET # BLD AUTO: 239 K/UL — SIGNIFICANT CHANGE UP (ref 130–400)
PLATELET # BLD AUTO: 239 K/UL — SIGNIFICANT CHANGE UP (ref 130–400)
POTASSIUM SERPL-MCNC: 4.8 MMOL/L — SIGNIFICANT CHANGE UP (ref 3.5–5)
POTASSIUM SERPL-SCNC: 4.8 MMOL/L — SIGNIFICANT CHANGE UP (ref 3.5–5)
PROT UR-MCNC: >=300
RBC # BLD: 2.44 M/UL — LOW (ref 4.7–6.1)
RBC # BLD: 2.66 M/UL — LOW (ref 4.7–6.1)
RBC # FLD: 13.3 % — SIGNIFICANT CHANGE UP (ref 11.5–14.5)
RBC # FLD: 13.5 % — SIGNIFICANT CHANGE UP (ref 11.5–14.5)
RBC CASTS # UR COMP ASSIST: >50 /HPF
SODIUM SERPL-SCNC: 141 MMOL/L — SIGNIFICANT CHANGE UP (ref 135–146)
SP GR SPEC: 1.01 — SIGNIFICANT CHANGE UP (ref 1.01–1.03)
UROBILINOGEN FLD QL: >=8 (ref 0.2–0.2)
WBC # BLD: 20.77 K/UL — HIGH (ref 4.8–10.8)
WBC # BLD: 21.51 K/UL — HIGH (ref 4.8–10.8)
WBC # FLD AUTO: 20.77 K/UL — HIGH (ref 4.8–10.8)
WBC # FLD AUTO: 21.51 K/UL — HIGH (ref 4.8–10.8)
WBC UR QL: >50 /HPF

## 2018-10-13 RX ORDER — MEROPENEM 1 G/30ML
500 INJECTION INTRAVENOUS EVERY 12 HOURS
Qty: 0 | Refills: 0 | Status: DISCONTINUED | OUTPATIENT
Start: 2018-10-13 | End: 2018-10-16

## 2018-10-13 RX ADMIN — AMLODIPINE BESYLATE 5 MILLIGRAM(S): 2.5 TABLET ORAL at 06:14

## 2018-10-13 RX ADMIN — Medication 325 MILLIGRAM(S): at 21:35

## 2018-10-13 RX ADMIN — Medication 3: at 11:50

## 2018-10-13 RX ADMIN — Medication 650 MILLIGRAM(S): at 22:58

## 2018-10-13 RX ADMIN — Medication 2 UNIT(S): at 08:09

## 2018-10-13 RX ADMIN — Medication 2: at 08:08

## 2018-10-13 RX ADMIN — MEMANTINE HYDROCHLORIDE 10 MILLIGRAM(S): 10 TABLET ORAL at 06:14

## 2018-10-13 RX ADMIN — Medication 650 MILLIGRAM(S): at 22:29

## 2018-10-13 RX ADMIN — SODIUM CHLORIDE 100 MILLILITER(S): 9 INJECTION INTRAMUSCULAR; INTRAVENOUS; SUBCUTANEOUS at 06:16

## 2018-10-13 RX ADMIN — ATORVASTATIN CALCIUM 40 MILLIGRAM(S): 80 TABLET, FILM COATED ORAL at 21:35

## 2018-10-13 RX ADMIN — MEMANTINE HYDROCHLORIDE 10 MILLIGRAM(S): 10 TABLET ORAL at 17:13

## 2018-10-13 RX ADMIN — Medication 2: at 17:12

## 2018-10-13 RX ADMIN — Medication 2 UNIT(S): at 11:50

## 2018-10-13 RX ADMIN — TAMSULOSIN HYDROCHLORIDE 0.4 MILLIGRAM(S): 0.4 CAPSULE ORAL at 21:35

## 2018-10-13 RX ADMIN — Medication 650 MILLIGRAM(S): at 05:42

## 2018-10-13 RX ADMIN — Medication 2 UNIT(S): at 17:12

## 2018-10-13 RX ADMIN — Medication 650 MILLIGRAM(S): at 02:15

## 2018-10-13 RX ADMIN — MEROPENEM 100 MILLIGRAM(S): 1 INJECTION INTRAVENOUS at 17:11

## 2018-10-13 RX ADMIN — Medication 325 MILLIGRAM(S): at 14:43

## 2018-10-13 RX ADMIN — DONEPEZIL HYDROCHLORIDE 10 MILLIGRAM(S): 10 TABLET, FILM COATED ORAL at 21:35

## 2018-10-13 RX ADMIN — Medication 325 MILLIGRAM(S): at 06:15

## 2018-10-13 NOTE — CHART NOTE - NSCHARTNOTEFT_GEN_A_CORE
Case D/W Dr. Lange: pt with fever, worsening creatinine and leukocytosis. Recommend getting Ct abd/pelvis (non-contrast) to r/o obstructing calculi.  Await callback from Dr. Rob  - CT ordered

## 2018-10-13 NOTE — PROGRESS NOTE ADULT - SUBJECTIVE AND OBJECTIVE BOX
S:   Pt still with maroon colored urine today. No complaints at this time, seems somnolent; febrile within last 24 hrs. Received PRBC's yesterday due to drop in Hgb    O; Vital Signs Last 24 Hrs  T(C): 36.2 (13 Oct 2018 05:19), Max: 38.4 (12 Oct 2018 14:00)  T(F): 97.2 (13 Oct 2018 05:19), Max: 101.2 (12 Oct 2018 14:00)  HR: 100 (13 Oct 2018 05:19) (100 - 118)  BP: 116/59 (13 Oct 2018 05:19) (116/59 - 123/58)  BP(mean): --  RR: 17 (13 Oct 2018 05:19) (16 - 17)      I&O's Detail    12 Oct 2018 07:01  -  13 Oct 2018 07:00  --------------------------------------------------------  IN:    Oral Fluid: 360 mL    sodium chloride 0.9%.: 100 mL  Total IN: 460 mL    OUT:    Indwelling Catheter - Urethral: 1700 mL (maroon colored)  Total OUT: 1700 mL    Total NET: -1240 mL      13 Oct 2018 07:01  -  13 Oct 2018 09:41  --------------------------------------------------------  IN:    sodium chloride 0.9%.: 200 mL  Total IN: 200 mL    OUT:    Indwelling Catheter - Urethral: 100 mL  Total OUT: 100 mL    Total NET: 100 mL      EXAM:    abd: soft, mild suprapubic tenderness    LABS: pending    CULTURES; none obtained S:   Pt still with maroon colored urine today. No complaints at this time, seems somnolent; febrile within last 24 hrs. Received PRBC's yesterday due to drop in Hgb    O; Vital Signs Last 24 Hrs  T(C): 36.2 (13 Oct 2018 05:19), Max: 38.4 (12 Oct 2018 14:00)  T(F): 97.2 (13 Oct 2018 05:19), Max: 101.2 (12 Oct 2018 14:00)  HR: 100 (13 Oct 2018 05:19) (100 - 118)  BP: 116/59 (13 Oct 2018 05:19) (116/59 - 123/58)  BP(mean): --  RR: 17 (13 Oct 2018 05:19) (16 - 17)    MEDICATIONS  (STANDING):  amLODIPine   Tablet 5 milliGRAM(s) Oral daily  atorvastatin 40 milliGRAM(s) Oral at bedtime  cefTRIAXone   IVPB 1 Gram(s) IV Intermittent every 24 hours  ciprofloxacin   IVPB 400 milliGRAM(s) IV Intermittent daily  ciprofloxacin   IVPB      dextrose 5%. 1000 milliLiter(s) (50 mL/Hr) IV Continuous <Continuous>  dextrose 50% Injectable 12.5 Gram(s) IV Push once  dextrose 50% Injectable 25 Gram(s) IV Push once  dextrose 50% Injectable 25 Gram(s) IV Push once  donepezil 10 milliGRAM(s) Oral at bedtime  ferrous    sulfate 325 milliGRAM(s) Oral three times a day  insulin lispro (HumaLOG) corrective regimen sliding scale   SubCutaneous three times a day before meals  insulin lispro Injectable (HumaLOG) 2 Unit(s) SubCutaneous three times a day before meals  memantine 10 milliGRAM(s) Oral two times a day  sodium chloride 0.9%. 1000 milliLiter(s) (100 mL/Hr) IV Continuous <Continuous>  tamsulosin 0.4 milliGRAM(s) Oral at bedtime    MEDICATIONS  (PRN):  acetaminophen   Tablet .. 650 milliGRAM(s) Oral every 6 hours PRN Temp greater or equal to 38C (100.4F), Mild Pain (1 - 3)  dextrose 40% Gel 15 Gram(s) Oral once PRN Blood Glucose LESS THAN 70 milliGRAM(s)/deciliter  glucagon  Injectable 1 milliGRAM(s) IntraMuscular once PRN Glucose LESS THAN 70 milligrams/deciliter    I&O's Detail    12 Oct 2018 07:01  -  13 Oct 2018 07:00  --------------------------------------------------------  IN:    Oral Fluid: 360 mL    sodium chloride 0.9%.: 100 mL  Total IN: 460 mL    OUT:    Indwelling Catheter - Urethral: 1700 mL (maroon colored)  Total OUT: 1700 mL    Total NET: -1240 mL      13 Oct 2018 07:01  -  13 Oct 2018 09:41  --------------------------------------------------------  IN:    sodium chloride 0.9%.: 200 mL  Total IN: 200 mL    OUT:    Indwelling Catheter - Urethral: 100 mL  Total OUT: 100 mL    Total NET: 100 mL      EXAM:    abd: soft, mild suprapubic tenderness, Hunt not draining at present, maroon colored urine.    HUNT IRRIGATED, SOME CLOTS REMOVED: FRUIT PUNCH COLORED THEREAFTER    LABS:                         7.6    20.77 )-----------( 239      ( 13 Oct 2018 07:19 )             23.9     10-13    141  |  104  |  46<H>  ----------------------------<  217<H>  4.8   |  22  |  2.6<H>    Ca    8.1<L>      13 Oct 2018 07:19    TPro  7.1  /  Alb  3.4<L>  /  TBili  0.3  /  DBili  x   /  AST  29  /  ALT  12  /  AlkPhos  134<H>  10-11      WBC Count: 20.77 (10-13 @ 07:19)  WBC Count: 23.01 (10-12 @ 15:57)  WBC Count: 17.45 (10-12 @ 02:15)  WBC Count: 9.95 (10-11 @ 11:48)      Creatinine: 2.6 (10-13 @ 07:19)    Creatinine: 2.5 (10-12 @ 15:57)    Creatinine: 1.7 (10-11 @ 11:48)        CULTURES; none obtained

## 2018-10-13 NOTE — CONSULT NOTE ADULT - ASSESSMENT
IMPRESSION:  Sepsis secondary to acute pyelonephritis    RECOMMENDATIONS:  [facility]  Meropenem 500 mg iv q12h

## 2018-10-13 NOTE — PROGRESS NOTE ADULT - PROBLEM SELECTOR PLAN 3
acute loss secondary to hematuria  continue to monitor H/H  continue iron replacement  consider transfusion if HCT < 25

## 2018-10-13 NOTE — PROGRESS NOTE ADULT - PROBLEM SELECTOR PLAN 1
de leon to gravity  irrigate prn  Hgb noted, continue to monitor de leon to gravity  irrigate prn  Hgb noted, continue to monitor  WBC continues to trend upward despite Cipro and Rocephin  obtain ID evaluation  need cultures

## 2018-10-13 NOTE — CHART NOTE - NSCHARTNOTEFT_GEN_A_CORE
Pt c/o suprapubic pressure and de leon not draining well. De Leon flushed multiple times, no clots. However, balloon deflated and approx 150-200 cc drained.  Balloon refilled with 20 cc water -- de leon remained patent

## 2018-10-13 NOTE — PROGRESS NOTE ADULT - SUBJECTIVE AND OBJECTIVE BOX
YAZAN WALKER  83y  Male  HPI:  83y old  Male who was sent to ED by Dr. Lange after noticing his de leon bag that he had placed on last admission here was filled with dark blood and clots. Pt was seen on previous admission by urology for urinary retention and NISHANT which resolved after placing de leon which drained 1700cc on insertion. Pt was due for routine de leon cath change today. Pt is poor historian and it is unclear when the hematuria started. (11 Oct 2018 21:56)    MEDICATIONS  (STANDING):  amLODIPine   Tablet 5 milliGRAM(s) Oral daily  atorvastatin 40 milliGRAM(s) Oral at bedtime  cefTRIAXone   IVPB 1 Gram(s) IV Intermittent every 24 hours  ciprofloxacin   IVPB 400 milliGRAM(s) IV Intermittent daily  ciprofloxacin   IVPB      dextrose 5%. 1000 milliLiter(s) (50 mL/Hr) IV Continuous <Continuous>  dextrose 50% Injectable 12.5 Gram(s) IV Push once  dextrose 50% Injectable 25 Gram(s) IV Push once  dextrose 50% Injectable 25 Gram(s) IV Push once  donepezil 10 milliGRAM(s) Oral at bedtime  ferrous    sulfate 325 milliGRAM(s) Oral three times a day  insulin lispro (HumaLOG) corrective regimen sliding scale   SubCutaneous three times a day before meals  insulin lispro Injectable (HumaLOG) 2 Unit(s) SubCutaneous three times a day before meals  memantine 10 milliGRAM(s) Oral two times a day  sodium chloride 0.9%. 1000 milliLiter(s) (100 mL/Hr) IV Continuous <Continuous>  tamsulosin 0.4 milliGRAM(s) Oral at bedtime    MEDICATIONS  (PRN):  acetaminophen   Tablet .. 650 milliGRAM(s) Oral every 6 hours PRN Temp greater or equal to 38C (100.4F), Mild Pain (1 - 3)  dextrose 40% Gel 15 Gram(s) Oral once PRN Blood Glucose LESS THAN 70 milliGRAM(s)/deciliter  glucagon  Injectable 1 milliGRAM(s) IntraMuscular once PRN Glucose LESS THAN 70 milligrams/deciliter    INTERVAL EVENTS: Patient seen today without distress, denies pain. Patient appears comfortable. De Leon catheter with gross hematuria.    T(C): 36.2 (10-13-18 @ 05:19), Max: 38.4 (10-12-18 @ 14:00)  HR: 100 (10-13-18 @ 05:19) (100 - 118)  BP: 116/59 (10-13-18 @ 05:19) (116/59 - 123/58)  RR: 17 (10-13-18 @ 05:19) (16 - 17)  SpO2: --  Wt(kg): --Vital Signs Last 24 Hrs  T(C): 36.2 (13 Oct 2018 05:19), Max: 38.4 (12 Oct 2018 14:00)  T(F): 97.2 (13 Oct 2018 05:19), Max: 101.2 (12 Oct 2018 14:00)  HR: 100 (13 Oct 2018 05:19) (100 - 118)  BP: 116/59 (13 Oct 2018 05:19) (116/59 - 123/58)  BP(mean): --  RR: 17 (13 Oct 2018 05:19) (16 - 17)  SpO2: --    PHYSICAL EXAM:  GENERAL: NAD  NECK: Supple, No JVD  CHEST/LUNG: Clear; Decreased effort  HEART: S1, S2, Regular rate and rhythm;   ABDOMEN: Soft, Nontender, Nondistended; Bowel sounds present  EXTREMITIES: No clubbing, cyanosis, or edema    LABS:  Labs:                        7.6    20.77 )-----------( 239      ( 13 Oct 2018 07:19 )             23.9             10-    141  |  104  |  46<H>  ----------------------------<  217<H>  4.8   |  22  |  2.6<H>    Ca    8.1<L>      13 Oct 2018 07:19    TPro  7.1  /  Alb  3.4<L>  /  TBili  0.3  /  DBili  x   /  AST  29  /  ALT  12  /  AlkPhos  134<H>  10-11    LIVER FUNCTIONS - ( 11 Oct 2018 11:48 )  Alb: 3.4 g/dL / Pro: 7.1 g/dL / ALK PHOS: 134 U/L / ALT: 12 U/L / AST: 29 U/L / GGT: x                   PT/INR - ( 12 Oct 2018 15:57 )   PT: 16.80 sec;   INR: 1.54 ratio      PTT - ( 12 Oct 2018 15:57 )  PTT:22.4 sec      Urinalysis Basic - ( 11 Oct 2018 15:00 )    Color: Yellow / Appearance: Cloudy / S.025 / pH: x  Gluc: x / Ketone: Negative  / Bili: Negative / Urobili: 1.0 mg/dL   Blood: x / Protein: >=300 mg/dL / Nitrite: Negative   Leuk Esterase: Moderate / RBC: >50 /HPF / WBC x   Sq Epi: x / Non Sq Epi: x / Bacteria: x        RADIOLOGY & ADDITIONAL TESTS:  < from: US Renal (10.11.18 @ 14:11) >    Impression:  Unchanged mild to moderate bilateral hydronephrosis.    Markedly enlarged prostate gland.      < end of copied text >

## 2018-10-13 NOTE — PROGRESS NOTE ADULT - ASSESSMENT
84 y/o male with urinary retention and gross hematuria, now with fevers and increasing WBC's --?source    - maintain de leon cath, irrigate prn  - continue to follow labs, transfuse prn  - obtain UA, blood culture  - IV fluid hydration  - continue rocephin and cipro  - Hold ASA if not contraindicated. 84 y/o male with urinary retention and gross hematuria, now with fevers and increasing WBC's --?source. Now also with NISHANT    1. Hematuria, R/O UTI  - maintain de leon cath, irrigate prn  - continue to follow labs, transfuse prn  - obtain UA, urine culture  - continue rocephin and cipro  - Hold ASA if not contraindicated.    - Pt tentatively schedule for cystoscopy on Monday 10/14 -- will need medical clearance    2. NISHANT   - ?due to acute anemia    - continue IVF; consider renal consult    3. Fever - ?source   - f/u pan cultures (blood and urine ordered today) ; f/u ID    Will D/W attending

## 2018-10-13 NOTE — CONSULT NOTE ADULT - SUBJECTIVE AND OBJECTIVE BOX
YAZAN WALKER  83y, Male  Allergy: No Known Allergies      HPI:  83y old  Male who was sent to ED by Dr. Lange after noticing his de leon bag that he had placed on last admission here was filled with dark blood and clots. Pt was seen on previous admission by urology for urinary retention and NISHANT which resolved after placing de leon which drained 1700cc on insertion. Pt was due for routine de leon cath change today. Pt is poor historian and it is unclear when the hematuria started. (11 Oct 2018 21:56)    FAMILY HISTORY:  No pertinent family history in first degree relatives    PAST MEDICAL & SURGICAL HISTORY:  Anemia  Dementia  Kidney disease  DM (diabetes mellitus)  HTN (hypertension)  H/O knee surgery: right knee        VITALS:  T(F): 97.2, Max: 101.2 (10-12-18 @ 14:00)  HR: 100  BP: 116/59  RR: 17Vital Signs Last 24 Hrs  T(C): 36.2 (13 Oct 2018 05:19), Max: 38.4 (12 Oct 2018 14:00)  T(F): 97.2 (13 Oct 2018 05:19), Max: 101.2 (12 Oct 2018 14:00)  HR: 100 (13 Oct 2018 05:19) (100 - 118)  BP: 116/59 (13 Oct 2018 05:19) (116/59 - 123/58)  BP(mean): --  RR: 17 (13 Oct 2018 05:19) (16 - 17)  SpO2: --    TESTS & MEASUREMENTS:                        7.6    20.77 )-----------( 239      ( 13 Oct 2018 07:19 )             23.9     10-    141  |  104  |  46<H>  ----------------------------<  217<H>  4.8   |  22  |  2.6<H>    Ca    8.1<L>      13 Oct 2018 07:19          Urinalysis Basic - ( 11 Oct 2018 15:00 )    Color: Yellow / Appearance: Cloudy / S.025 / pH: x  Gluc: x / Ketone: Negative  / Bili: Negative / Urobili: 1.0 mg/dL   Blood: x / Protein: >=300 mg/dL / Nitrite: Negative   Leuk Esterase: Moderate / RBC: >50 /HPF / WBC x   Sq Epi: x / Non Sq Epi: x / Bacteria: x          RADIOLOGY & ADDITIONAL TESTS:    ANTIBIOTICS:  ciprofloxacin   IVPB 400 milliGRAM(s) IV Intermittent daily  ciprofloxacin   IVPB

## 2018-10-14 LAB
ALBUMIN SERPL ELPH-MCNC: 2.3 G/DL — LOW (ref 3.5–5.2)
ALP SERPL-CCNC: 113 U/L — SIGNIFICANT CHANGE UP (ref 30–115)
ALT FLD-CCNC: 6 U/L — SIGNIFICANT CHANGE UP (ref 0–41)
ANION GAP SERPL CALC-SCNC: 16 MMOL/L — HIGH (ref 7–14)
AST SERPL-CCNC: 11 U/L — SIGNIFICANT CHANGE UP (ref 0–41)
BILIRUB SERPL-MCNC: 0.4 MG/DL — SIGNIFICANT CHANGE UP (ref 0.2–1.2)
BUN SERPL-MCNC: 45 MG/DL — HIGH (ref 10–20)
CALCIUM SERPL-MCNC: 7.8 MG/DL — LOW (ref 8.5–10.1)
CHLORIDE SERPL-SCNC: 109 MMOL/L — SIGNIFICANT CHANGE UP (ref 98–110)
CO2 SERPL-SCNC: 21 MMOL/L — SIGNIFICANT CHANGE UP (ref 17–32)
CREAT SERPL-MCNC: 2 MG/DL — HIGH (ref 0.7–1.5)
GLUCOSE BLDC GLUCOMTR-MCNC: 194 MG/DL — HIGH (ref 70–99)
GLUCOSE BLDC GLUCOMTR-MCNC: 222 MG/DL — HIGH (ref 70–99)
GLUCOSE BLDC GLUCOMTR-MCNC: 238 MG/DL — HIGH (ref 70–99)
GLUCOSE BLDC GLUCOMTR-MCNC: 262 MG/DL — HIGH (ref 70–99)
GLUCOSE SERPL-MCNC: 207 MG/DL — HIGH (ref 70–99)
HCT VFR BLD CALC: 20.9 % — LOW (ref 42–52)
HCT VFR BLD CALC: 26.6 % — LOW (ref 42–52)
HGB BLD-MCNC: 6.7 G/DL — CRITICAL LOW (ref 14–18)
HGB BLD-MCNC: 8.8 G/DL — LOW (ref 14–18)
MCHC RBC-ENTMCNC: 29 PG — SIGNIFICANT CHANGE UP (ref 27–31)
MCHC RBC-ENTMCNC: 29.4 PG — SIGNIFICANT CHANGE UP (ref 27–31)
MCHC RBC-ENTMCNC: 32.1 G/DL — SIGNIFICANT CHANGE UP (ref 32–37)
MCHC RBC-ENTMCNC: 33.1 G/DL — SIGNIFICANT CHANGE UP (ref 32–37)
MCV RBC AUTO: 89 FL — SIGNIFICANT CHANGE UP (ref 80–94)
MCV RBC AUTO: 90.5 FL — SIGNIFICANT CHANGE UP (ref 80–94)
NRBC # BLD: 0 /100 WBCS — SIGNIFICANT CHANGE UP (ref 0–0)
NRBC # BLD: 0 /100 WBCS — SIGNIFICANT CHANGE UP (ref 0–0)
PLATELET # BLD AUTO: 242 K/UL — SIGNIFICANT CHANGE UP (ref 130–400)
PLATELET # BLD AUTO: 246 K/UL — SIGNIFICANT CHANGE UP (ref 130–400)
POTASSIUM SERPL-MCNC: 4.5 MMOL/L — SIGNIFICANT CHANGE UP (ref 3.5–5)
POTASSIUM SERPL-SCNC: 4.5 MMOL/L — SIGNIFICANT CHANGE UP (ref 3.5–5)
PROT SERPL-MCNC: 5.1 G/DL — LOW (ref 6–8)
RBC # BLD: 2.31 M/UL — LOW (ref 4.7–6.1)
RBC # BLD: 2.99 M/UL — LOW (ref 4.7–6.1)
RBC # FLD: 13.5 % — SIGNIFICANT CHANGE UP (ref 11.5–14.5)
RBC # FLD: 13.6 % — SIGNIFICANT CHANGE UP (ref 11.5–14.5)
SODIUM SERPL-SCNC: 146 MMOL/L — SIGNIFICANT CHANGE UP (ref 135–146)
WBC # BLD: 23.63 K/UL — HIGH (ref 4.8–10.8)
WBC # BLD: 25.23 K/UL — HIGH (ref 4.8–10.8)
WBC # FLD AUTO: 23.63 K/UL — HIGH (ref 4.8–10.8)
WBC # FLD AUTO: 25.23 K/UL — HIGH (ref 4.8–10.8)

## 2018-10-14 RX ADMIN — SODIUM CHLORIDE 100 MILLILITER(S): 9 INJECTION INTRAMUSCULAR; INTRAVENOUS; SUBCUTANEOUS at 09:52

## 2018-10-14 RX ADMIN — Medication 2 UNIT(S): at 09:50

## 2018-10-14 RX ADMIN — Medication 650 MILLIGRAM(S): at 17:27

## 2018-10-14 RX ADMIN — Medication 325 MILLIGRAM(S): at 05:58

## 2018-10-14 RX ADMIN — ATORVASTATIN CALCIUM 40 MILLIGRAM(S): 80 TABLET, FILM COATED ORAL at 21:13

## 2018-10-14 RX ADMIN — Medication 325 MILLIGRAM(S): at 21:13

## 2018-10-14 RX ADMIN — SODIUM CHLORIDE 100 MILLILITER(S): 9 INJECTION INTRAMUSCULAR; INTRAVENOUS; SUBCUTANEOUS at 06:53

## 2018-10-14 RX ADMIN — Medication 325 MILLIGRAM(S): at 17:14

## 2018-10-14 RX ADMIN — TAMSULOSIN HYDROCHLORIDE 0.4 MILLIGRAM(S): 0.4 CAPSULE ORAL at 21:13

## 2018-10-14 RX ADMIN — Medication 2: at 09:50

## 2018-10-14 RX ADMIN — AMLODIPINE BESYLATE 5 MILLIGRAM(S): 2.5 TABLET ORAL at 05:58

## 2018-10-14 RX ADMIN — Medication 2 UNIT(S): at 17:14

## 2018-10-14 RX ADMIN — Medication 650 MILLIGRAM(S): at 17:26

## 2018-10-14 RX ADMIN — MEMANTINE HYDROCHLORIDE 10 MILLIGRAM(S): 10 TABLET ORAL at 17:15

## 2018-10-14 RX ADMIN — MEROPENEM 100 MILLIGRAM(S): 1 INJECTION INTRAVENOUS at 17:14

## 2018-10-14 RX ADMIN — Medication 2: at 17:15

## 2018-10-14 RX ADMIN — MEROPENEM 100 MILLIGRAM(S): 1 INJECTION INTRAVENOUS at 05:59

## 2018-10-14 RX ADMIN — DONEPEZIL HYDROCHLORIDE 10 MILLIGRAM(S): 10 TABLET, FILM COATED ORAL at 21:13

## 2018-10-14 RX ADMIN — MEMANTINE HYDROCHLORIDE 10 MILLIGRAM(S): 10 TABLET ORAL at 05:58

## 2018-10-14 NOTE — PROGRESS NOTE ADULT - ASSESSMENT
82 y/o male with urinary retention and gross hematuria, now with fevers and increasing WBC's --?source. Now also with NISHANT    1. Hematuria, R/O UTI  - maintain de leon cath, CBI STARTED TODAY  - continue to follow labs, transfuse PRBC - HGB 6.7  - continue rocephin and cipro  - Hold ASA if not contraindicated.    - will need medical clearance FOR possible  OR this week      d/w dr. rangel

## 2018-10-14 NOTE — CONSULT NOTE ADULT - SUBJECTIVE AND OBJECTIVE BOX
NEPHROLOGY CONSULTATION NOTE    83y old  Male who was sent to ED by Dr. Lange after noticing his de leon bag that he had placed on last admission here was filled with dark blood and clots. Pt was seen on previous admission by urology for urinary retention and NISHANT which improved  after placing de leon which drained 1700cc on insertion.   Pt was due for routine de leon cath change today. Pt is poor historian history obtained from records.    He has underlying CKD Cr 1.6 as far back as . some variability and NISHANT in setting of obstruction.    when presented Cr was 2.6 today 2.0   PAST MEDICAL & SURGICAL HISTORY:  Anemia  Dementia  Kidney disease  DM (diabetes mellitus)  HTN (hypertension)  H/O knee surgery: right knee    Allergies:  No Known Allergies    Home Medications Reviewed  Hospital Medications:   MEDICATIONS  (STANDING):  amLODIPine   Tablet 5 milliGRAM(s) Oral daily  atorvastatin 40 milliGRAM(s) Oral at bedtime  dextrose 5%. 1000 milliLiter(s) (50 mL/Hr) IV Continuous <Continuous>  dextrose 50% Injectable 12.5 Gram(s) IV Push once  dextrose 50% Injectable 25 Gram(s) IV Push once  dextrose 50% Injectable 25 Gram(s) IV Push once  donepezil 10 milliGRAM(s) Oral at bedtime  ferrous    sulfate 325 milliGRAM(s) Oral three times a day  insulin lispro (HumaLOG) corrective regimen sliding scale   SubCutaneous three times a day before meals  insulin lispro Injectable (HumaLOG) 2 Unit(s) SubCutaneous three times a day before meals  memantine 10 milliGRAM(s) Oral two times a day  meropenem  IVPB 500 milliGRAM(s) IV Intermittent every 12 hours  sodium chloride 0.9%. 1000 milliLiter(s) (100 mL/Hr) IV Continuous <Continuous>  tamsulosin 0.4 milliGRAM(s) Oral at bedtime      SOCIAL HISTORY:  Denies ETOH,Smoking,   FAMILY HISTORY:  No pertinent family history in first degree relatives        REVIEW OF SYSTEMS:  unable to obtain      VITALS:  T(F): 98.2 (10-14-18 @ 05:57), Max: 102 (10-13-18 @ 22:21)  HR: 113 (10-14-18 @ 05:57)  BP: 133/72 (10-14-18 @ 05:57)  RR: 18 (10-14-18 @ 05:57)  SpO2: --    10-13 @ 07:  -  10-14 @ 07:00  --------------------------------------------------------  IN: 1980 mL / OUT: 1975 mL / NET: 5 mL          10-13-18 @ 07:  -  10-14-18 @ 07:00  --------------------------------------------------------  IN: 0 mL / OUT: 1975 mL / NET: -1975 mL      I&O's Detail    13 Oct 2018 07:  -  14 Oct 2018 07:00  --------------------------------------------------------  IN:    IV PiggyBack: 100 mL    Oral Fluid: 680 mL    sodium chloride 0.9%.: 1200 mL  Total IN: 1980 mL    OUT:    Indwelling Catheter - Urethral: 1975 mL  Total OUT: 1975 mL    Total NET: 5 mL            PHYSICAL EXAM:  Constitutional: NAD  HEENT: anicteric sclera, oropharynx clear, MMM  Neck: No JVD  Respiratory: CTAB, no wheezes, rales or rhonchi  Cardiovascular: S1, S2, RRR  Gastrointestinal: BS+, soft, NT/ND  Extremities: No cyanosis or clubbing. No peripheral edema  Neurological: A/O x 3, no focal deficits  Psychiatric: Normal mood, normal affect  : No CVA tenderness. + de leon. (gross hematuira)  Skin: No rashes  Vascular Access:    LABS:  10-14    146  |  109  |  45<H>  ----------------------------<  207<H>  4.5   |  21  |  2.0<H>    Ca    7.8<L>      14 Oct 2018 09:02    TPro  5.1<L>  /  Alb  2.3<L>  /  TBili  0.4  /  DBili      /  AST  11  /  ALT  6   /  AlkPhos  113  10-14    Creatinine Trend: 2.0 <--, 2.6 <--, 2.5 <--, 1.7 <--, 1.5 <--, 1.7 <--, 1.8 <--, 1.9 <--, 2.3 <--, 2.9 <--, 3.9 <--, 5.3 <--, 6.5 <--                        6.7    23.63 )-----------( 246      ( 14 Oct 2018 09:02 )             20.9     Urine Studies:  Urinalysis Basic - ( 13 Oct 2018 15:42 )    Color: Red / Appearance: Other / S.010 / pH:   Gluc:  / Ketone: 40  / Bili: Large / Urobili: >=8.0   Blood:  / Protein: >=300 / Nitrite: Positive   Leuk Esterase: Large / RBC: >50 /HPF / WBC >50 /HPF   Sq Epi:  / Non Sq Epi:  / Bacteria: Many                RADIOLOGY & ADDITIONAL STUDIES:

## 2018-10-14 NOTE — PROGRESS NOTE ADULT - SUBJECTIVE AND OBJECTIVE BOX
YAZAN WALKER  83y  Male  HPI:  83y old  Male who was sent to ED by Dr. Lange after noticing his de leon bag that he had placed on last admission here was filled with dark blood and clots. Pt was seen on previous admission by urology for urinary retention and NISHANT which resolved after placing de leon which drained 1700cc on insertion. Pt was due for routine de leon cath change today. Pt is poor historian and it is unclear when the hematuria started. (11 Oct 2018 21:56)    MEDICATIONS  (STANDING):  amLODIPine   Tablet 5 milliGRAM(s) Oral daily  atorvastatin 40 milliGRAM(s) Oral at bedtime  dextrose 5%. 1000 milliLiter(s) (50 mL/Hr) IV Continuous <Continuous>  dextrose 50% Injectable 12.5 Gram(s) IV Push once  dextrose 50% Injectable 25 Gram(s) IV Push once  dextrose 50% Injectable 25 Gram(s) IV Push once  donepezil 10 milliGRAM(s) Oral at bedtime  ferrous    sulfate 325 milliGRAM(s) Oral three times a day  insulin lispro (HumaLOG) corrective regimen sliding scale   SubCutaneous three times a day before meals  insulin lispro Injectable (HumaLOG) 2 Unit(s) SubCutaneous three times a day before meals  memantine 10 milliGRAM(s) Oral two times a day  meropenem  IVPB 500 milliGRAM(s) IV Intermittent every 12 hours  sodium chloride 0.9%. 1000 milliLiter(s) (100 mL/Hr) IV Continuous <Continuous>  tamsulosin 0.4 milliGRAM(s) Oral at bedtime    MEDICATIONS  (PRN):  acetaminophen   Tablet .. 650 milliGRAM(s) Oral every 6 hours PRN Temp greater or equal to 38C (100.4F), Mild Pain (1 - 3)  dextrose 40% Gel 15 Gram(s) Oral once PRN Blood Glucose LESS THAN 70 milliGRAM(s)/deciliter  glucagon  Injectable 1 milliGRAM(s) IntraMuscular once PRN Glucose LESS THAN 70 milligrams/deciliter    INTERVAL EVENTS: Patient seen today, feels weak. Chart reviewed. Patient ow with CBI, for persistent hematuria.    T(C): 36.8 (10-14-18 @ 05:57), Max: 38.9 (10-13-18 @ 22:21)  HR: 113 (10-14-18 @ 05:57) (101 - 113)  BP: 133/72 (10-14-18 @ 05:57) (109/58 - 133/72)  RR: 18 (10-14-18 @ 05:57) (16 - 18)  SpO2: --  Wt(kg): --Vital Signs Last 24 Hrs  T(C): 36.8 (14 Oct 2018 05:57), Max: 38.9 (13 Oct 2018 22:21)  T(F): 98.2 (14 Oct 2018 05:57), Max: 102 (13 Oct 2018 22:21)  HR: 113 (14 Oct 2018 05:57) (101 - 113)  BP: 133/72 (14 Oct 2018 05:57) (109/58 - 133/72)  BP(mean): --  RR: 18 (14 Oct 2018 05:57) (16 - 18)  SpO2: --    PHYSICAL EXAM:  GENERAL: NAD  NECK: Supple, No JVD  CHEST/LUNG: Clear; Decreased effort  HEART: S1, S2, Regular rate and rhythm;   ABDOMEN: Soft, Nontender, Nondistended; Bowel sounds present  EXTREMITIES: Trace edema      LABS:  Labs:                        6.7    23.63 )-----------( 246      ( 14 Oct 2018 09:02 )             20.9             10-14    146  |  109  |  45<H>  ----------------------------<  207<H>  4.5   |  21  |  2.0<H>    Ca    7.8<L>      14 Oct 2018 09:02    TPro  5.1<L>  /  Alb  2.3<L>  /  TBili  0.4  /  DBili  x   /  AST  11  /  ALT  6   /  AlkPhos  113  10-14    LIVER FUNCTIONS - ( 14 Oct 2018 09:02 )  Alb: 2.3 g/dL / Pro: 5.1 g/dL / ALK PHOS: 113 U/L / ALT: 6 U/L / AST: 11 U/L / GGT: x                   PT/INR - ( 12 Oct 2018 15:57 )   PT: 16.80 sec;   INR: 1.54 ratio      PTT - ( 12 Oct 2018 15:57 )  PTT:22.4 sec    Urinalysis Basic - ( 13 Oct 2018 15:42 )    Color: Red / Appearance: Other / S.010 / pH: x  Gluc: x / Ketone: 40  / Bili: Large / Urobili: >=8.0   Blood: x / Protein: >=300 / Nitrite: Positive   Leuk Esterase: Large / RBC: >50 /HPF / WBC >50 /HPF   Sq Epi: x / Non Sq Epi: x / Bacteria: Many        RADIOLOGY & ADDITIONAL TESTS:  < from: 12 Lead ECG (10.11.18 @ 14:18) >  Diagnosis Line Sinus tachycardia  Inferior infarct , age undetermined  Abnormal ECG    < end of copied text >        < from: CT Abdomen and Pelvis No Cont (10.13.18 @ 20:40) >  Comparison made with CT abdomen and pelvis September 15, 2018.    FINDINGS:    LOWER CHEST: Decreased, trace bilateral pleural effusions.    HEPATOBILIARY: Unremarkable.    SPLEEN: Unremarkable.    PANCREAS: Unremarkable.    ADRENAL GLANDS: Unremarkable.    KIDNEYS: Persistent mild to moderate bilateral hydroureteronephrosis; no   renal calculus bilaterally.    ABDOMINOPELVIC NODES: Unremarkable.    PELVIC ORGANS: Unchanged marked BPH, with extensive median lobe   hypertrophy. Urinary bladder partially  distended with De Leon catheter. Hemorrhage within urinary bladder,   correlating with hematuria.    PERITONEUM/MESENTERY/BOWEL: Unremarkable.    BONES/SOFT TISSUES: Unchanged asymmetric pelvic bone trabecular and   cortical thickening; may represent Paget's disease.    OTHER: Vascular calcifications      IMPRESSION:   Since September 15, 2018:    1. Unchanged marked BPH, with new hemorrhage within urinary bladder.   Persistent mild to moderate bilateral hydroureteronephrosis; no renal   calculus bilaterally. Correlate for urinary outflow tract obstruction.      < end of copied text >

## 2018-10-14 NOTE — PROGRESS NOTE ADULT - PROBLEM SELECTOR PLAN 1
de leon to gravity changed to CBI this am  Hgb noted, to transfuse again today  ID evaluation noted, antibiotics changed to Meropenem  CT results noted  await culture results

## 2018-10-14 NOTE — PROGRESS NOTE ADULT - PROBLEM SELECTOR PLAN 3
acute loss secondary to hematuria  for transfusion today, 2 units now, a third unit if necessary  continue to monitor H/H  continue iron replacement

## 2018-10-14 NOTE — CONSULT NOTE ADULT - ASSESSMENT
ia    On Iron, check iron stores 83y old  Male who was sent to ED by Dr. Lange after noticing his de leon bag. Longstanding CKD ? Diabetes, ? reflux nephropathy NISHANT on CKD now improved     stable hydro on Us   ? Obstruction due t o clots?     - Trend Cr  monitor UOP  avoid renal toxins   f/u   ensure de leon draining doesnt become occluded     HTn  - controlled w/ amlodipine    anemia  on iron  - check iron stores  - worse in setting of Blood loss  - transfuse keep hgb > 7    MBP  - check phos, PTH

## 2018-10-14 NOTE — PROGRESS NOTE ADULT - SUBJECTIVE AND OBJECTIVE BOX
S:   Pt still with maroon colored urine today. No complaints at this time, seems somnolent.  Pt did have a temp spike last night   HGb drop again today .    O;   Vital Signs Last 24 Hrs  T(C): 36.8 (14 Oct 2018 05:57), Max: 38.9 (13 Oct 2018 22:21)  T(F): 98.2 (14 Oct 2018 05:57), Max: 102 (13 Oct 2018 22:21)  HR: 113 (14 Oct 2018 05:57) (101 - 113)  BP: 133/72 (14 Oct 2018 05:57) (109/58 - 133/72)  BP(mean): --  RR: 18 (14 Oct 2018 05:57) (16 - 18)    MEDICATIONS  (STANDING):  amLODIPine   Tablet 5 milliGRAM(s) Oral daily  atorvastatin 40 milliGRAM(s) Oral at bedtime  cefTRIAXone   IVPB 1 Gram(s) IV Intermittent every 24 hours  ciprofloxacin   IVPB 400 milliGRAM(s) IV Intermittent daily  ciprofloxacin   IVPB      dextrose 5%. 1000 milliLiter(s) (50 mL/Hr) IV Continuous <Continuous>  dextrose 50% Injectable 12.5 Gram(s) IV Push once  dextrose 50% Injectable 25 Gram(s) IV Push once  dextrose 50% Injectable 25 Gram(s) IV Push once  donepezil 10 milliGRAM(s) Oral at bedtime  ferrous    sulfate 325 milliGRAM(s) Oral three times a day  insulin lispro (HumaLOG) corrective regimen sliding scale   SubCutaneous three times a day before meals  insulin lispro Injectable (HumaLOG) 2 Unit(s) SubCutaneous three times a day before meals  memantine 10 milliGRAM(s) Oral two times a day  sodium chloride 0.9%. 1000 milliLiter(s) (100 mL/Hr) IV Continuous <Continuous>  tamsulosin 0.4 milliGRAM(s) Oral at bedtime    MEDICATIONS  (PRN):  acetaminophen   Tablet .. 650 milliGRAM(s) Oral every 6 hours PRN Temp greater or equal to 38C (100.4F), Mild Pain (1 - 3)  dextrose 40% Gel 15 Gram(s) Oral once PRN Blood Glucose LESS THAN 70 milliGRAM(s)/deciliter  glucagon  Injectable 1 milliGRAM(s) IntraMuscular once PRN Glucose LESS THAN 70 milligrams/deciliter      EXAM:    abd: soft, mild suprapubic tenderness, [place]  draining at present, maroon colored urine.        10-14    146  |  109  |  45<H>  ----------------------------<  207<H>  4.5   |  21  |  2.0<H>    Ca    7.8<L>      14 Oct 2018 09:02    TPro  5.1<L>  /  Alb  2.3<L>  /  TBili  0.4  /  DBili  x   /  AST  11  /  ALT  6   /  AlkPhos  113  10-14                            6.7    23.63 )-----------( 246      ( 14 Oct 2018 09:02 )             20.9     CAPILLARY BLOOD GLUCOSE      POCT Blood Glucose.: 238 mg/dL (14 Oct 2018 08:00)  POCT Blood Glucose.: 176 mg/dL (13 Oct 2018 21:03)  POCT Blood Glucose.: 203 mg/dL (13 Oct 2018 16:24)  POCT Blood Glucose.: 280 mg/dL (13 Oct 2018 11:28)

## 2018-10-15 LAB
-  AMIKACIN: SIGNIFICANT CHANGE UP
-  AMOXICILLIN/CLAVULANIC ACID: SIGNIFICANT CHANGE UP
-  AMPICILLIN/SULBACTAM: SIGNIFICANT CHANGE UP
-  AMPICILLIN: SIGNIFICANT CHANGE UP
-  AMPICILLIN: SIGNIFICANT CHANGE UP
-  AZTREONAM: SIGNIFICANT CHANGE UP
-  CEFAZOLIN: SIGNIFICANT CHANGE UP
-  CEFEPIME: SIGNIFICANT CHANGE UP
-  CEFOXITIN: SIGNIFICANT CHANGE UP
-  CEFTRIAXONE: SIGNIFICANT CHANGE UP
-  CIPROFLOXACIN: SIGNIFICANT CHANGE UP
-  CIPROFLOXACIN: SIGNIFICANT CHANGE UP
-  ERTAPENEM: SIGNIFICANT CHANGE UP
-  GENTAMICIN: SIGNIFICANT CHANGE UP
-  IMIPENEM: SIGNIFICANT CHANGE UP
-  LEVOFLOXACIN: SIGNIFICANT CHANGE UP
-  LEVOFLOXACIN: SIGNIFICANT CHANGE UP
-  MEROPENEM: SIGNIFICANT CHANGE UP
-  NITROFURANTOIN: SIGNIFICANT CHANGE UP
-  NITROFURANTOIN: SIGNIFICANT CHANGE UP
-  PIPERACILLIN/TAZOBACTAM: SIGNIFICANT CHANGE UP
-  TETRACYCLINE: SIGNIFICANT CHANGE UP
-  TIGECYCLINE: SIGNIFICANT CHANGE UP
-  TOBRAMYCIN: SIGNIFICANT CHANGE UP
-  TRIMETHOPRIM/SULFAMETHOXAZOLE: SIGNIFICANT CHANGE UP
-  VANCOMYCIN: SIGNIFICANT CHANGE UP
ANION GAP SERPL CALC-SCNC: 16 MMOL/L — HIGH (ref 7–14)
BUN SERPL-MCNC: 38 MG/DL — HIGH (ref 10–20)
CALCIUM SERPL-MCNC: 8.3 MG/DL — LOW (ref 8.5–10.1)
CHLORIDE SERPL-SCNC: 112 MMOL/L — HIGH (ref 98–110)
CO2 SERPL-SCNC: 19 MMOL/L — SIGNIFICANT CHANGE UP (ref 17–32)
CREAT SERPL-MCNC: 1.9 MG/DL — HIGH (ref 0.7–1.5)
CULTURE RESULTS: SIGNIFICANT CHANGE UP
GLUCOSE BLDC GLUCOMTR-MCNC: 184 MG/DL — HIGH (ref 70–99)
GLUCOSE BLDC GLUCOMTR-MCNC: 214 MG/DL — HIGH (ref 70–99)
GLUCOSE BLDC GLUCOMTR-MCNC: 238 MG/DL — HIGH (ref 70–99)
GLUCOSE BLDC GLUCOMTR-MCNC: 239 MG/DL — HIGH (ref 70–99)
GLUCOSE SERPL-MCNC: 242 MG/DL — HIGH (ref 70–99)
HCT VFR BLD CALC: 31.3 % — LOW (ref 42–52)
HCT VFR BLD CALC: 32.2 % — LOW (ref 42–52)
HGB BLD-MCNC: 10.5 G/DL — LOW (ref 14–18)
HGB BLD-MCNC: 10.5 G/DL — LOW (ref 14–18)
MCHC RBC-ENTMCNC: 29.4 PG — SIGNIFICANT CHANGE UP (ref 27–31)
MCHC RBC-ENTMCNC: 29.7 PG — SIGNIFICANT CHANGE UP (ref 27–31)
MCHC RBC-ENTMCNC: 32.6 G/DL — SIGNIFICANT CHANGE UP (ref 32–37)
MCHC RBC-ENTMCNC: 33.5 G/DL — SIGNIFICANT CHANGE UP (ref 32–37)
MCV RBC AUTO: 88.7 FL — SIGNIFICANT CHANGE UP (ref 80–94)
MCV RBC AUTO: 90.2 FL — SIGNIFICANT CHANGE UP (ref 80–94)
METHOD TYPE: SIGNIFICANT CHANGE UP
METHOD TYPE: SIGNIFICANT CHANGE UP
NRBC # BLD: 0 /100 WBCS — SIGNIFICANT CHANGE UP (ref 0–0)
NRBC # BLD: 0 /100 WBCS — SIGNIFICANT CHANGE UP (ref 0–0)
ORGANISM # SPEC MICROSCOPIC CNT: SIGNIFICANT CHANGE UP
PLATELET # BLD AUTO: 183 K/UL — SIGNIFICANT CHANGE UP (ref 130–400)
PLATELET # BLD AUTO: 292 K/UL — SIGNIFICANT CHANGE UP (ref 130–400)
POTASSIUM SERPL-MCNC: 4.7 MMOL/L — SIGNIFICANT CHANGE UP (ref 3.5–5)
POTASSIUM SERPL-SCNC: 4.7 MMOL/L — SIGNIFICANT CHANGE UP (ref 3.5–5)
RBC # BLD: 3.53 M/UL — LOW (ref 4.7–6.1)
RBC # BLD: 3.57 M/UL — LOW (ref 4.7–6.1)
RBC # FLD: 13.9 % — SIGNIFICANT CHANGE UP (ref 11.5–14.5)
RBC # FLD: 14 % — SIGNIFICANT CHANGE UP (ref 11.5–14.5)
SODIUM SERPL-SCNC: 147 MMOL/L — HIGH (ref 135–146)
SPECIMEN SOURCE: SIGNIFICANT CHANGE UP
WBC # BLD: 23.95 K/UL — HIGH (ref 4.8–10.8)
WBC # BLD: 27.36 K/UL — HIGH (ref 4.8–10.8)
WBC # FLD AUTO: 23.95 K/UL — HIGH (ref 4.8–10.8)
WBC # FLD AUTO: 27.36 K/UL — HIGH (ref 4.8–10.8)

## 2018-10-15 RX ORDER — FLUCONAZOLE 150 MG/1
200 TABLET ORAL EVERY 24 HOURS
Qty: 0 | Refills: 0 | Status: DISCONTINUED | OUTPATIENT
Start: 2018-10-15 | End: 2018-10-16

## 2018-10-15 RX ADMIN — Medication 325 MILLIGRAM(S): at 21:16

## 2018-10-15 RX ADMIN — TAMSULOSIN HYDROCHLORIDE 0.4 MILLIGRAM(S): 0.4 CAPSULE ORAL at 21:16

## 2018-10-15 RX ADMIN — FLUCONAZOLE 100 MILLIGRAM(S): 150 TABLET ORAL at 13:57

## 2018-10-15 RX ADMIN — Medication 2: at 11:55

## 2018-10-15 RX ADMIN — AMLODIPINE BESYLATE 5 MILLIGRAM(S): 2.5 TABLET ORAL at 05:05

## 2018-10-15 RX ADMIN — Medication 325 MILLIGRAM(S): at 14:19

## 2018-10-15 RX ADMIN — MEROPENEM 100 MILLIGRAM(S): 1 INJECTION INTRAVENOUS at 05:05

## 2018-10-15 RX ADMIN — MEMANTINE HYDROCHLORIDE 10 MILLIGRAM(S): 10 TABLET ORAL at 17:55

## 2018-10-15 RX ADMIN — SODIUM CHLORIDE 100 MILLILITER(S): 9 INJECTION INTRAMUSCULAR; INTRAVENOUS; SUBCUTANEOUS at 05:06

## 2018-10-15 RX ADMIN — DONEPEZIL HYDROCHLORIDE 10 MILLIGRAM(S): 10 TABLET, FILM COATED ORAL at 21:16

## 2018-10-15 RX ADMIN — SODIUM CHLORIDE 100 MILLILITER(S): 9 INJECTION INTRAMUSCULAR; INTRAVENOUS; SUBCUTANEOUS at 12:56

## 2018-10-15 RX ADMIN — Medication 2 UNIT(S): at 11:56

## 2018-10-15 RX ADMIN — Medication 2: at 16:37

## 2018-10-15 RX ADMIN — Medication 2 UNIT(S): at 16:37

## 2018-10-15 RX ADMIN — MEROPENEM 100 MILLIGRAM(S): 1 INJECTION INTRAVENOUS at 17:55

## 2018-10-15 RX ADMIN — Medication 325 MILLIGRAM(S): at 05:05

## 2018-10-15 RX ADMIN — ATORVASTATIN CALCIUM 40 MILLIGRAM(S): 80 TABLET, FILM COATED ORAL at 21:16

## 2018-10-15 RX ADMIN — MEMANTINE HYDROCHLORIDE 10 MILLIGRAM(S): 10 TABLET ORAL at 05:05

## 2018-10-15 NOTE — PROGRESS NOTE ADULT - SUBJECTIVE AND OBJECTIVE BOX
YAZAN WALKER  83y  Male      Patient is a 83y old  Male who presents with a chief complaint of urinary retention (14 Oct 2018 12:19)    gross hematuria on CBI    REVIEW OF SYSTEMS:  CONSTITUTIONAL: No fever, weight loss, or fatigue  EYES: No eye pain, visual disturbances, or discharge  ENMT:  No difficulty hearing, tinnitus, vertigo; No sinus or throat pain  NECK: No pain or stiffness  BREASTS: No pain, masses, or nipple discharge  RESPIRATORY: No cough, wheezing, chills or hemoptysis; No shortness of breath  CARDIOVASCULAR: No chest pain, palpitations, dizziness, or leg swelling  GASTROINTESTINAL: No abdominal or epigastric pain. No nausea, vomiting, or hematemesis; No diarrhea or constipation. No melena or hematochezia.  GENITOURINARY: No dysuria, frequency, now clearing up  NEUROLOGICAL: No headaches, memory loss, loss of strength, numbness, or tremors  SKIN: No itching, burning, rashes, or lesions   LYMPH NODES: No enlarged glands  ENDOCRINE: No heat or cold intolerance; No hair loss  MUSCULOSKELETAL: No joint pain or swelling; No muscle, back, or extremity pain  PSYCHIATRIC: No depression, anxiety, mood swings, or difficulty sleeping  HEME/LYMPH: No easy bruising, or bleeding gums  ALLERY AND IMMUNOLOGIC: No hives or eczema  FAMILY HISTORY:  No pertinent family history in first degree relatives    T(C): 37.3 (10-15-18 @ 05:09), Max: 37.7 (10-14-18 @ 21:00)  HR: 87 (10-15-18 @ 05:09) (85 - 99)  BP: 128/62 (10-15-18 @ 05:09) (113/58 - 128/62)  RR: 16 (10-15-18 @ 05:09) (14 - 16)  SpO2: 95% (10-15-18 @ 05:09) (95% - 95%)  Wt(kg): --Vital Signs Last 24 Hrs  T(C): 37.3 (15 Oct 2018 05:09), Max: 37.7 (14 Oct 2018 21:00)  T(F): 99.1 (15 Oct 2018 05:09), Max: 99.8 (14 Oct 2018 21:00)  HR: 87 (15 Oct 2018 05:09) (85 - 99)  BP: 128/62 (15 Oct 2018 05:09) (113/58 - 128/62)  BP(mean): --  RR: 16 (15 Oct 2018 05:09) (14 - 16)  SpO2: 95% (15 Oct 2018 05:09) (95% - 95%)  No Known Allergies      PHYSICAL EXAM:  GENERAL: NAD, well-groomed, well-developed  HEAD:  Atraumatic, Normocephalic  EYES: EOMI, PERRLA, conjunctiva and sclera clear  ENMT: No tonsillar erythema, exudates, or enlargement;   NECK: Supple, No JVD, Normal thyroid  NERVOUS SYSTEM:  Alert & Oriented X3, Good concentration;   CHEST/LUNG: Clear to percussion bilaterally; No rales, rhonchi, wheezing, or rubs  HEART: Regular rate and rhythm; No murmurs, rubs, or gallops  ABDOMEN: Soft, Nontender, Nondistended; Bowel sounds present  EXTREMITIES:  2+ Peripheral Pulses, No clubbing, cyanosis, or edema  LYMPH: No lymphadenopathy noted  SKIN: No rashes or lesions      LABS:  10-14    146  |  109  |  45<H>  ----------------------------<  207<H>  4.5   |  21  |  2.0<H>    Ca    7.8<L>      14 Oct 2018 09:02    TPro  5.1<L>  /  Alb  2.3<L>  /  TBili  0.4  /  DBili  x   /  AST  11  /  ALT  6   /  AlkPhos  113  10-14                          8.8    25.23 )-----------( 242      ( 14 Oct 2018 18:49 )             26.6         RADIOLOGY & ADDITIONAL TESTS:    MEDICATION:  acetaminophen   Tablet .. 650 milliGRAM(s) Oral every 6 hours PRN  amLODIPine   Tablet 5 milliGRAM(s) Oral daily  atorvastatin 40 milliGRAM(s) Oral at bedtime  dextrose 40% Gel 15 Gram(s) Oral once PRN  dextrose 5%. 1000 milliLiter(s) IV Continuous <Continuous>  dextrose 50% Injectable 12.5 Gram(s) IV Push once  dextrose 50% Injectable 25 Gram(s) IV Push once  dextrose 50% Injectable 25 Gram(s) IV Push once  donepezil 10 milliGRAM(s) Oral at bedtime  ferrous    sulfate 325 milliGRAM(s) Oral three times a day  glucagon  Injectable 1 milliGRAM(s) IntraMuscular once PRN  insulin lispro (HumaLOG) corrective regimen sliding scale   SubCutaneous three times a day before meals  insulin lispro Injectable (HumaLOG) 2 Unit(s) SubCutaneous three times a day before meals  memantine 10 milliGRAM(s) Oral two times a day  meropenem  IVPB 500 milliGRAM(s) IV Intermittent every 12 hours  sodium chloride 0.9%. 1000 milliLiter(s) IV Continuous <Continuous>  tamsulosin 0.4 milliGRAM(s) Oral at bedtime      HEALTH ISSUES - PROBLEM Dx:  Dementia: Dementia  DM (diabetes mellitus): DM (diabetes mellitus)  HTN (hypertension): HTN (hypertension)  Anemia: Anemia,acute blood loss s/p 3 units prbc  Urinary retention: Urinary retention on de leon   Hematuria: Hematuria, on meropenam ,going for cystoscopy ,medically stable to go for procedure

## 2018-10-15 NOTE — PROGRESS NOTE ADULT - ASSESSMENT
# Sepsis   # Obstructive Uropathy  # B/L Hydronephrosis  # Complicated UTI- Enterococcus and Morganella    Would recommend:  1. Continue Meropenem 500 mg iv q12h  2. Continue CBI until hematuria resolved  3. Monitor WBC count  4. Monitor Kidney function and adjust antibiotic doses accordingly  5. Management of Hydronephrosis as per Urology team     d/w Nursing staff

## 2018-10-15 NOTE — PHYSICAL THERAPY INITIAL EVALUATION ADULT - SPECIFY REASON(S)
Chart reviewed. Patient is scheduled for procedure today. As discussed with TYREL Mackay, will hold PT at this time and complete evaluation after procedure is done.

## 2018-10-15 NOTE — PROGRESS NOTE ADULT - SUBJECTIVE AND OBJECTIVE BOX
Nephrology progress note    Patient is seen and examined, events over the last 24 h noted .  On CBI for hematuria. On  cc/hr for NISHANT. Ddenies complaints.    Allergies:  No Known Allergies    Hospital Medications:   MEDICATIONS  (STANDING):  amLODIPine   Tablet 5 milliGRAM(s) Oral daily  atorvastatin 40 milliGRAM(s) Oral at bedtime  dextrose 5%. 1000 milliLiter(s) (50 mL/Hr) IV Continuous <Continuous>  dextrose 50% Injectable 12.5 Gram(s) IV Push once  dextrose 50% Injectable 25 Gram(s) IV Push once  dextrose 50% Injectable 25 Gram(s) IV Push once  donepezil 10 milliGRAM(s) Oral at bedtime  ferrous    sulfate 325 milliGRAM(s) Oral three times a day  fluconAZOLE IVPB 200 milliGRAM(s) IV Intermittent every 24 hours  insulin lispro (HumaLOG) corrective regimen sliding scale   SubCutaneous three times a day before meals  insulin lispro Injectable (HumaLOG) 2 Unit(s) SubCutaneous three times a day before meals  memantine 10 milliGRAM(s) Oral two times a day  meropenem  IVPB 500 milliGRAM(s) IV Intermittent every 12 hours  sodium chloride 0.9%. 1000 milliLiter(s) (100 mL/Hr) IV Continuous <Continuous>  tamsulosin 0.4 milliGRAM(s) Oral at bedtime        VITALS:  T(F): 99.1 (10-15-18 @ 05:09), Max: 99.8 (10-14-18 @ 21:00)  HR: 87 (10-15-18 @ 05:09)  BP: 128/62 (10-15-18 @ 05:09)  RR: 16 (10-15-18 @ 05:09)  SpO2: 95% (10-15-18 @ 05:09)  Wt(kg): --    10-13 @ :  -  10-14 @ 07:00  --------------------------------------------------------  IN: 1980 mL / OUT: 1975 mL / NET: 5 mL    10-14 @ 07:01  -  10-15 @ 07:00  --------------------------------------------------------  IN: 31587 mL / OUT: 15594 mL / NET: 780 mL    10-15 @ 07:01  -  10-15 @ 13:43  --------------------------------------------------------  IN: 6500 mL / OUT: 8075 mL / NET: -1575 mL      Height (cm): 167.64 (10-15 @ 05:15)  Weight (kg): 92.5 (10-15 @ 05:15)  BMI (kg/m2): 32.9 (10-15 @ 05:15)  BSA (m2): 2.02 (10-15 @ 05:15)    PHYSICAL EXAM:  Constitutional: NAD  HEENT: anicteric sclera, oropharynx clear, MMM  Neck: No JVD  Respiratory: CTAB, no wheezes, rales or rhonchi  Cardiovascular: S1, S2, RRR  Gastrointestinal: BS+, soft, NT/ND  Extremities: No cyanosis or clubbing. No peripheral edema  Neurological: A/O x 3, no focal deficits  : No CVA tenderness. No de leon.   Skin: No rashes  Vascular Access:    LABS:  10-15    147<H>  |  112<H>  |  38<H>  ----------------------------<  242<H>  4.7   |  19  |  1.9<H>    Ca    8.3<L>      15 Oct 2018 11:32    TPro  5.1<L>  /  Alb  2.3<L>  /  TBili  0.4  /  DBili      /  AST  11  /  ALT  6   /  AlkPhos  113  10-14                          10.5   23.95 )-----------( 183      ( 15 Oct 2018 11:32 )             32.2       Urine Studies:  Urinalysis Basic - ( 13 Oct 2018 15:42 )    Color: Red / Appearance: Other / S.010 / pH:   Gluc:  / Ketone: 40  / Bili: Large / Urobili: >=8.0   Blood:  / Protein: >=300 / Nitrite: Positive   Leuk Esterase: Large / RBC: >50 /HPF / WBC >50 /HPF   Sq Epi:  / Non Sq Epi:  / Bacteria: Many        RADIOLOGY & ADDITIONAL STUDIES:  < from: CT Abdomen and Pelvis No Cont (10.13. @ 20:40) >  KIDNEYS: Persistent mild to moderate bilateral hydroureteronephrosis; no   renal calculus bilaterally.    < end of copied text >

## 2018-10-15 NOTE — PROGRESS NOTE ADULT - SUBJECTIVE AND OBJECTIVE BOX
Patient is a 83y old  Male who presents with a chief complaint of urinary retention (15 Oct 2018 13:43)      INTERVAL HPI/OVERNIGHT EVENTS:    MEDICATIONS  (STANDING):  amLODIPine   Tablet 5 milliGRAM(s) Oral daily  atorvastatin 40 milliGRAM(s) Oral at bedtime  dextrose 5%. 1000 milliLiter(s) (50 mL/Hr) IV Continuous <Continuous>  dextrose 50% Injectable 12.5 Gram(s) IV Push once  dextrose 50% Injectable 25 Gram(s) IV Push once  dextrose 50% Injectable 25 Gram(s) IV Push once  donepezil 10 milliGRAM(s) Oral at bedtime  ferrous    sulfate 325 milliGRAM(s) Oral three times a day  fluconAZOLE IVPB 200 milliGRAM(s) IV Intermittent every 24 hours  insulin lispro (HumaLOG) corrective regimen sliding scale   SubCutaneous three times a day before meals  insulin lispro Injectable (HumaLOG) 2 Unit(s) SubCutaneous three times a day before meals  memantine 10 milliGRAM(s) Oral two times a day  meropenem  IVPB 500 milliGRAM(s) IV Intermittent every 12 hours  sodium chloride 0.9%. 1000 milliLiter(s) (100 mL/Hr) IV Continuous <Continuous>  tamsulosin 0.4 milliGRAM(s) Oral at bedtime    MEDICATIONS  (PRN):  acetaminophen   Tablet .. 650 milliGRAM(s) Oral every 6 hours PRN Temp greater or equal to 38C (100.4F), Mild Pain (1 - 3)  dextrose 40% Gel 15 Gram(s) Oral once PRN Blood Glucose LESS THAN 70 milliGRAM(s)/deciliter  glucagon  Injectable 1 milliGRAM(s) IntraMuscular once PRN Glucose LESS THAN 70 milligrams/deciliter      Allergies    No Known Allergies    Intolerances        REVIEW OF SYSTEMS:  CONSTITUTIONAL: No fever, weight loss, or fatigue  EYES: No eye pain, visual disturbances, or discharge  ENMT:  No difficulty hearing, tinnitus, vertigo; No sinus or throat pain  NECK: No pain or stiffness  BREASTS: No pain, masses, or nipple discharge  RESPIRATORY: No cough, wheezing, chills or hemoptysis; No shortness of breath  CARDIOVASCULAR: No chest pain, palpitations, dizziness, or leg swelling  GASTROINTESTINAL: No abdominal or epigastric pain. No nausea, vomiting, or hematemesis; No diarrhea or constipation. No melena or hematochezia.  GENITOURINARY: No dysuria, frequency, hematuria, or incontinence  NEUROLOGICAL: No headaches, memory loss, loss of strength, numbness, or tremors  SKIN: No itching, burning, rashes, or lesions   LYMPH NODES: No enlarged glands  ENDOCRINE: No heat or cold intolerance; No hair loss  MUSCULOSKELETAL: No joint pain or swelling; No muscle, back, or extremity pain  PSYCHIATRIC: No depression, anxiety, mood swings, or difficulty sleeping  HEME/LYMPH: No easy bruising, or bleeding gums  ALLERGY AND IMMUNOLOGIC: No hives or eczema    Vital Signs Last 24 Hrs  T(C): 35.7 (15 Oct 2018 22:09), Max: 37.3 (15 Oct 2018 04:50)  T(F): 96.2 (15 Oct 2018 22:09), Max: 99.1 (15 Oct 2018 04:50)  HR: 92 (15 Oct 2018 22:09) (87 - 92)  BP: 128/62 (15 Oct 2018 22:09) (124/67 - 128/62)  BP(mean): --  RR: 16 (15 Oct 2018 22:09) (16 - 16)  SpO2: 95% (15 Oct 2018 05:09) (95% - 95%)    PHYSICAL EXAM:  GENERAL: NAD, well-groomed, well-developed  HEAD:  Atraumatic, Normocephalic  EYES: EOMI, PERRLA, conjunctiva and sclera clear  ENMT: No tonsillar erythema, exudates, or enlargement; Moist mucous membranes, Good dentition, No lesions  NECK: Supple, No JVD, Normal thyroid  NERVOUS SYSTEM:  Alert & Oriented X3, Good concentration; Motor Strength 5/5 B/L upper and lower extremities; DTRs 2+ intact and symmetric  CHEST/LUNG: Clear to percussion bilaterally; No rales, rhonchi, wheezing, or rubs  HEART: Regular rate and rhythm; No murmurs, rubs, or gallops  ABDOMEN: Soft, Nontender, Nondistended; Bowel sounds present  EXTREMITIES:  2+ Peripheral Pulses, No clubbing, cyanosis, or edema  LYMPH: No lymphadenopathy noted  SKIN: No rashes or lesions    LABS:                        10.5   23.95 )-----------( 183      ( 15 Oct 2018 11:32 )             32.2     10-15    147<H>  |  112<H>  |  38<H>  ----------------------------<  242<H>  4.7   |  19  |  1.9<H>    Ca    8.3<L>      15 Oct 2018 11:32    TPro  5.1<L>  /  Alb  2.3<L>  /  TBili  0.4  /  DBili  x   /  AST  11  /  ALT  6   /  AlkPhos  113  10-14        CAPILLARY BLOOD GLUCOSE      POCT Blood Glucose.: 184 mg/dL (15 Oct 2018 21:32)  POCT Blood Glucose.: 214 mg/dL (15 Oct 2018 16:03)  POCT Blood Glucose.: 238 mg/dL (15 Oct 2018 11:33)  POCT Blood Glucose.: 239 mg/dL (15 Oct 2018 07:30)      RADIOLOGY & ADDITIONAL TESTS:    Imaging Personally Reviewed:  [ ] YES  [ ] NO    Consultant(s) Notes Reviewed:  [ ] YES  [ ] NO    Care Discussed with Consultants/Other Providers [ ] YES  [ ] NO Patient is seen and examined at the bed side, is afebrile.  He is doing better. The hematuria is resolving, on CBI.      REVIEW OF SYSTEMS: All other review systems are negative        Vital Signs Last 24 Hrs  T(C): 35.7 (15 Oct 2018 22:09), Max: 37.3 (15 Oct 2018 04:50)  T(F): 96.2 (15 Oct 2018 22:09), Max: 99.1 (15 Oct 2018 04:50)  HR: 92 (15 Oct 2018 22:09) (87 - 92)  BP: 128/62 (15 Oct 2018 22:09) (124/67 - 128/62)  BP(mean): --  RR: 16 (15 Oct 2018 22:09) (16 - 16)  SpO2: 95% (15 Oct 2018 05:09) (95% - 95%)        PHYSICAL EXAM:  GENERAL: Not in distress  CHEST/LUNG: Air entry  bilaterally  HEART: s1 and s2 present  ABDOMEN: Nontender and  Nondistended  " Oates catheter in placed  EXTREMITIES:  No pedal  edema  CNS: Awake and alert  but slow to response        MEDICATIONS  (STANDING):  amLODIPine   Tablet 5 milliGRAM(s) Oral daily  atorvastatin 40 milliGRAM(s) Oral at bedtime  donepezil 10 milliGRAM(s) Oral at bedtime  ferrous    sulfate 325 milliGRAM(s) Oral three times a day  fluconAZOLE IVPB 200 milliGRAM(s) IV Intermittent every 24 hours  insulin lispro (HumaLOG) corrective regimen sliding scale   SubCutaneous three times a day before meals  insulin lispro Injectable (HumaLOG) 2 Unit(s) SubCutaneous three times a day before meals  memantine 10 milliGRAM(s) Oral two times a day  meropenem  IVPB 500 milliGRAM(s) IV Intermittent every 12 hours  sodium chloride 0.9%. 1000 milliLiter(s) (100 mL/Hr) IV Continuous <Continuous>  tamsulosin 0.4 milliGRAM(s) Oral at bedtime    MEDICATIONS  (PRN):  acetaminophen   Tablet .. 650 milliGRAM(s) Oral every 6 hours PRN Temp greater or equal to 38C (100.4F), Mild Pain (1 - 3)  dextrose 40% Gel 15 Gram(s) Oral once PRN Blood Glucose LESS THAN 70 milliGRAM(s)/deciliter  glucagon  Injectable 1 milliGRAM(s) IntraMuscular once PRN Glucose LESS THAN 70 milligrams/deciliter          Allergies    No Known Allergies        LABS:                        10.5   23.95 )-----------( 183      ( 15 Oct 2018 11:32 )             32.2     10-15    147<H>  |  112<H>  |  38<H>  ----------------------------<  242<H>  4.7   |  19  |  1.9<H>    Ca    8.3<L>      15 Oct 2018 11:32    TPro  5.1<L>  /  Alb  2.3<L>  /  TBili  0.4  /  DBili  x   /  AST  11  /  ALT  6   /  AlkPhos  113  10-14        CAPILLARY BLOOD GLUCOSE      POCT Blood Glucose.: 184 mg/dL (15 Oct 2018 21:32)  POCT Blood Glucose.: 214 mg/dL (15 Oct 2018 16:03)  POCT Blood Glucose.: 238 mg/dL (15 Oct 2018 11:33)  POCT Blood Glucose.: 239 mg/dL (15 Oct 2018 07:30)      RADIOLOGY & ADDITIONAL TESTS:    < from: CT Abdomen and Pelvis No Cont (10.13.18 @ 20:40) >  1. Unchanged marked BPH, with new hemorrhage within urinary bladder.   Persistent mild to moderate bilateral hydroureteronephrosis; no renal   calculus bilaterally. Correlate for urinary outflow tract obstruction.    < end of copied text >        MICROBIOLOGY DATA:      Urine Microscopic-Add On (NC) (10.13.18 @ 15:42)    Bacteria: Many    White Blood Cell - Urine: >50 /HPF    Red Blood Cell - Urine: >50 /HPF      Culture - Urine (10.13.18 @ 15:42)    -  Amikacin: S <=8    -  Amoxicillin/Clavulanic Acid: R >16/8    -  Ampicillin: R >16 These ampicillin results predict results for amoxicillin    -  Ampicillin: S <=2 Predicts results to ampicillin/sulbactam, amoxacillin-clavulanate and  piperacillin-tazobactam.    -  Ampicillin/Sulbactam: R >16/8    -  Aztreonam: S <=4    -  Cefazolin: R >16    -  Cefepime: S <=2    -  Cefoxitin: S 8    -  Ceftriaxone: S <=1 Enterobacter, Citrobacter, and Serratia may develop resistance during prolonged therapy    -  Ciprofloxacin: S <=0.5    -  Ciprofloxacin: S <=1    -  Ertapenem: S <=0.5    -  Gentamicin: S <=1    -  Imipenem: S <=1    -  Levofloxacin: S <=1    -  Levofloxacin: S 1    -  Meropenem: S <=1    -  Nitrofurantoin: R >64 Should not be used to treat pyelonephritis    -  Nitrofurantoin: S <=32 Should not be used to treat pyelonephritis.    -  Piperacillin/Tazobactam: S <=8    -  Tetra/Doxy: S <=1    -  Tigecycline: R <=1    -  Tobramycin: S <=2    -  Trimethoprim/Sulfamethoxazole: S <=0.5/9.5    -  Vancomycin: S 4    Specimen Source: .Urine Catheterized    Culture Results:   50,000 - 99,000 CFU/mL Enterococcus faecalis  10,000 - 49,000 CFU/mL Morganella morganii    Organism Identification: Enterococcus faecalis  Morganella morganii    Organism: Morganella morganii    Organism: Enterococcus faecalis    Method Type: CARLOS    Method Type: CARLOS      Culture - Blood (10.13.18 @ 11:26)    Specimen Source: .Blood None    Culture Results:   No growth to date.

## 2018-10-15 NOTE — PROGRESS NOTE ADULT - ASSESSMENT
83y old  Male who was sent to ED by Dr. Lange for hematuria   Longstanding CKD ? Diabetes, ? reflux nephropathy NISHANT on CKD now improved     stable hydro on Us   ? Obstruction due to clots?     creat is improving, regina creat 1.8 in MAy 2018  - cont CBI as per     Mild hypernatremia - change NS to 1/2  cc/hr  monitor UOP    HTn  - controlled w/ amlodipine    anemia - iron stores  cont iron  - transfuse keep hgb > 7    MBD  - check phos, PTH

## 2018-10-16 DIAGNOSIS — N39.0 URINARY TRACT INFECTION, SITE NOT SPECIFIED: ICD-10-CM

## 2018-10-16 LAB
ANION GAP SERPL CALC-SCNC: 17 MMOL/L — HIGH (ref 7–14)
BUN SERPL-MCNC: 29 MG/DL — HIGH (ref 10–20)
CALCIUM SERPL-MCNC: 8.4 MG/DL — LOW (ref 8.5–10.1)
CHLORIDE SERPL-SCNC: 113 MMOL/L — HIGH (ref 98–110)
CO2 SERPL-SCNC: 19 MMOL/L — SIGNIFICANT CHANGE UP (ref 17–32)
CREAT SERPL-MCNC: 1.4 MG/DL — SIGNIFICANT CHANGE UP (ref 0.7–1.5)
GLUCOSE BLDC GLUCOMTR-MCNC: 190 MG/DL — HIGH (ref 70–99)
GLUCOSE BLDC GLUCOMTR-MCNC: 193 MG/DL — HIGH (ref 70–99)
GLUCOSE BLDC GLUCOMTR-MCNC: 193 MG/DL — HIGH (ref 70–99)
GLUCOSE BLDC GLUCOMTR-MCNC: 211 MG/DL — HIGH (ref 70–99)
GLUCOSE SERPL-MCNC: 197 MG/DL — HIGH (ref 70–99)
HCT VFR BLD CALC: 32 % — LOW (ref 42–52)
HGB BLD-MCNC: 10.3 G/DL — LOW (ref 14–18)
MCHC RBC-ENTMCNC: 28.9 PG — SIGNIFICANT CHANGE UP (ref 27–31)
MCHC RBC-ENTMCNC: 32.2 G/DL — SIGNIFICANT CHANGE UP (ref 32–37)
MCV RBC AUTO: 89.9 FL — SIGNIFICANT CHANGE UP (ref 80–94)
NRBC # BLD: 0 /100 WBCS — SIGNIFICANT CHANGE UP (ref 0–0)
PLATELET # BLD AUTO: 297 K/UL — SIGNIFICANT CHANGE UP (ref 130–400)
POTASSIUM SERPL-MCNC: 4.3 MMOL/L — SIGNIFICANT CHANGE UP (ref 3.5–5)
POTASSIUM SERPL-SCNC: 4.3 MMOL/L — SIGNIFICANT CHANGE UP (ref 3.5–5)
RBC # BLD: 3.56 M/UL — LOW (ref 4.7–6.1)
RBC # FLD: 13.8 % — SIGNIFICANT CHANGE UP (ref 11.5–14.5)
SODIUM SERPL-SCNC: 149 MMOL/L — HIGH (ref 135–146)
WBC # BLD: 21.6 K/UL — HIGH (ref 4.8–10.8)
WBC # FLD AUTO: 21.6 K/UL — HIGH (ref 4.8–10.8)

## 2018-10-16 PROCEDURE — 99232 SBSQ HOSP IP/OBS MODERATE 35: CPT

## 2018-10-16 RX ORDER — SODIUM CHLORIDE 9 MG/ML
1000 INJECTION INTRAMUSCULAR; INTRAVENOUS; SUBCUTANEOUS
Qty: 0 | Refills: 0 | Status: DISCONTINUED | OUTPATIENT
Start: 2018-10-16 | End: 2018-10-16

## 2018-10-16 RX ORDER — SODIUM CHLORIDE 9 MG/ML
1000 INJECTION, SOLUTION INTRAVENOUS
Qty: 0 | Refills: 0 | Status: DISCONTINUED | OUTPATIENT
Start: 2018-10-16 | End: 2018-10-17

## 2018-10-16 RX ORDER — CIPROFLOXACIN LACTATE 400MG/40ML
250 VIAL (ML) INTRAVENOUS
Qty: 0 | Refills: 0 | Status: DISCONTINUED | OUTPATIENT
Start: 2018-10-16 | End: 2018-10-19

## 2018-10-16 RX ORDER — AMOXICILLIN 250 MG/5ML
250 SUSPENSION, RECONSTITUTED, ORAL (ML) ORAL EVERY 8 HOURS
Qty: 0 | Refills: 0 | Status: DISCONTINUED | OUTPATIENT
Start: 2018-10-16 | End: 2018-10-23

## 2018-10-16 RX ADMIN — Medication 325 MILLIGRAM(S): at 14:25

## 2018-10-16 RX ADMIN — MEMANTINE HYDROCHLORIDE 10 MILLIGRAM(S): 10 TABLET ORAL at 17:15

## 2018-10-16 RX ADMIN — MEROPENEM 100 MILLIGRAM(S): 1 INJECTION INTRAVENOUS at 05:50

## 2018-10-16 RX ADMIN — Medication 250 MILLIGRAM(S): at 21:23

## 2018-10-16 RX ADMIN — Medication 1: at 07:59

## 2018-10-16 RX ADMIN — DONEPEZIL HYDROCHLORIDE 10 MILLIGRAM(S): 10 TABLET, FILM COATED ORAL at 21:24

## 2018-10-16 RX ADMIN — Medication 1: at 16:31

## 2018-10-16 RX ADMIN — Medication 250 MILLIGRAM(S): at 17:15

## 2018-10-16 RX ADMIN — Medication 325 MILLIGRAM(S): at 05:53

## 2018-10-16 RX ADMIN — ATORVASTATIN CALCIUM 40 MILLIGRAM(S): 80 TABLET, FILM COATED ORAL at 21:24

## 2018-10-16 RX ADMIN — Medication 2 UNIT(S): at 07:59

## 2018-10-16 RX ADMIN — Medication 1: at 11:40

## 2018-10-16 RX ADMIN — Medication 650 MILLIGRAM(S): at 08:15

## 2018-10-16 RX ADMIN — Medication 2 UNIT(S): at 11:40

## 2018-10-16 RX ADMIN — SODIUM CHLORIDE 100 MILLILITER(S): 9 INJECTION, SOLUTION INTRAVENOUS at 09:55

## 2018-10-16 RX ADMIN — SODIUM CHLORIDE 100 MILLILITER(S): 9 INJECTION INTRAMUSCULAR; INTRAVENOUS; SUBCUTANEOUS at 05:54

## 2018-10-16 RX ADMIN — AMLODIPINE BESYLATE 5 MILLIGRAM(S): 2.5 TABLET ORAL at 05:53

## 2018-10-16 RX ADMIN — Medication 250 MILLIGRAM(S): at 14:25

## 2018-10-16 RX ADMIN — Medication 325 MILLIGRAM(S): at 21:24

## 2018-10-16 RX ADMIN — TAMSULOSIN HYDROCHLORIDE 0.4 MILLIGRAM(S): 0.4 CAPSULE ORAL at 21:23

## 2018-10-16 RX ADMIN — MEMANTINE HYDROCHLORIDE 10 MILLIGRAM(S): 10 TABLET ORAL at 05:53

## 2018-10-16 RX ADMIN — Medication 2 UNIT(S): at 16:30

## 2018-10-16 NOTE — PROGRESS NOTE ADULT - SUBJECTIVE AND OBJECTIVE BOX
Urology Note    Patient is seen and examined, events over the last 24 h noted .  urine remains pain with clots in tubing.  Plan by Dr Lange for possible cystoscopy tomorrow.     Allergies:  No Known Allergies    Hospital Medications:   MEDICATIONS  (STANDING):  amLODIPine   Tablet 5 milliGRAM(s) Oral daily  amoxicillin      Capsule 250 milliGRAM(s) Oral every 8 hours  atorvastatin 40 milliGRAM(s) Oral at bedtime  ciprofloxacin     Tablet 250 milliGRAM(s) Oral two times a day  dextrose 5%. 1000 milliLiter(s) (50 mL/Hr) IV Continuous <Continuous>  dextrose 50% Injectable 12.5 Gram(s) IV Push once  dextrose 50% Injectable 25 Gram(s) IV Push once  dextrose 50% Injectable 25 Gram(s) IV Push once  donepezil 10 milliGRAM(s) Oral at bedtime  ferrous    sulfate 325 milliGRAM(s) Oral three times a day  insulin lispro (HumaLOG) corrective regimen sliding scale   SubCutaneous three times a day before meals  insulin lispro Injectable (HumaLOG) 2 Unit(s) SubCutaneous three times a day before meals  memantine 10 milliGRAM(s) Oral two times a day  sodium chloride 0.9%. 1000 milliLiter(s) (50 mL/Hr) IV Continuous <Continuous>  tamsulosin 0.4 milliGRAM(s) Oral at bedtime        VITALS:  T(F): 96.7 (10-16-18 @ 05:58), Max: 97.4 (10-15-18 @ 14:00)  HR: 80 (10-16-18 @ 05:58)  BP: 126/63 (10-16-18 @ 05:58)  RR: 18 (10-16-18 @ 05:58)  SpO2: --  Wt(kg): --    10-14 @ 07:01  -  10-15 @ 07:00  --------------------------------------------------------  IN: 43762 mL / OUT: 66167 mL / NET: 780 mL    10-15 @ 07:01  -  10-16 @ 07:00  --------------------------------------------------------  IN: 47868 mL / OUT: 68018 mL / NET: -53575 mL          PHYSICAL EXAM:  Constitutional: NAD  HEENT: anicteric sclera, oropharynx clear, MMM  Neck: No JVD  Respiratory: CTAB, no wheezes, rales or rhonchi  Cardiovascular: S1, S2, RRR  Gastrointestinal: BS+, soft, NT/ND  Extremities: No leg edema  Neurological: Awake alert  :  de leon with pink urine.   Skin: No rashes  Vascular Access:    LABS:  10-15    147<H>  |  112<H>  |  38<H>  ----------------------------<  242<H>  4.7   |  19  |  1.9<H>  Creatinine Trend: 1.9<--, 2.0<--, 2.6<--, 2.5<--, 1.7<--, 1.5<--    Ca    8.3<L>      15 Oct 2018 11:32    TPro  5.1<L>  /  Alb  2.3<L>  /  TBili  0.4  /  DBili      /  AST  11  /  ALT  6   /  AlkPhos  113  10-14                          10.5   23.95 )-----------( 183      ( 15 Oct 2018 11:32 )             32.2       Urine Studies:  Urinalysis Basic - ( 13 Oct 2018 15:42 )    Color: Red / Appearance: Other / S.010 / pH:   Gluc:  / Ketone: 40  / Bili: Large / Urobili: >=8.0   Blood:  / Protein: >=300 / Nitrite: Positive   Leuk Esterase: Large / RBC: >50 /HPF / WBC >50 /HPF   Sq Epi:  / Non Sq Epi:  / Bacteria: Many        RADIOLOGY & ADDITIONAL STUDIES:

## 2018-10-16 NOTE — PROGRESS NOTE ADULT - ASSESSMENT
83y old  Male who was sent to ED by Dr. Lange for hematuria   Longstanding CKD ? Diabetes, ? reflux nephropathy NISHANT on CKD now improved     stable hydro on Us   ? Obstruction due to clots?   Pt is going for cystoscopy tomorrow    creat is improving, baseline creat 1.8 in MAy 2018  - cont CBI as per     Mild hypernatremia - change NS to 1/2  cc/hr  monitor UOP  encourage po water intake    HTn  - controlled w/ amlodipine    UTI - still with elevated WBC - on Ampicillin  - ID f/u    anemia - iron stores  cont iron  - transfuse keep hgb > 7    MBD  - check phos, PTH    Will sign off  call as needed

## 2018-10-16 NOTE — PROGRESS NOTE ADULT - SUBJECTIVE AND OBJECTIVE BOX
infectious diseases progress note:  YAZAN WALKER is a 83yMale patient    HEMATURIA;URINARY RETENTION    Dementia  DM (diabetes mellitus)  HTN (hypertension)  Anemia  Urinary retention  Hematuria      ROS:  Not relevant     Allergies    No Known Allergies    Intolerances        ANTIBIOTICS/RELEVANT:  antimicrobials  amoxicillin      Capsule 250 milliGRAM(s) Oral every 8 hours  trimethoprim   80 mG/sulfamethoxazole 400 mG 1 Tablet(s) Oral two times a day    immunologic:    OTHER:  acetaminophen   Tablet .. 650 milliGRAM(s) Oral every 6 hours PRN  amLODIPine   Tablet 5 milliGRAM(s) Oral daily  atorvastatin 40 milliGRAM(s) Oral at bedtime  dextrose 40% Gel 15 Gram(s) Oral once PRN  dextrose 5%. 1000 milliLiter(s) IV Continuous <Continuous>  dextrose 50% Injectable 12.5 Gram(s) IV Push once  dextrose 50% Injectable 25 Gram(s) IV Push once  dextrose 50% Injectable 25 Gram(s) IV Push once  donepezil 10 milliGRAM(s) Oral at bedtime  ferrous    sulfate 325 milliGRAM(s) Oral three times a day  glucagon  Injectable 1 milliGRAM(s) IntraMuscular once PRN  insulin lispro (HumaLOG) corrective regimen sliding scale   SubCutaneous three times a day before meals  insulin lispro Injectable (HumaLOG) 2 Unit(s) SubCutaneous three times a day before meals  memantine 10 milliGRAM(s) Oral two times a day  sodium chloride 0.9%. 1000 milliLiter(s) IV Continuous <Continuous>  tamsulosin 0.4 milliGRAM(s) Oral at bedtime      Objective:  T(F): 96.7 (10-16-18 @ 05:58), Max: 97.4 (10-15-18 @ 14:00)  HR: 80 (10-16-18 @ 05:58) (80 - 92)  BP: 126/63 (10-16-18 @ 05:58) (124/67 - 128/62)  RR: 18 (10-16-18 @ 05:58) (16 - 18)  SpO2: --    PHYSICAL EXAM:  Constitutional:Well-developed, well nourished  Neck:no JVD, no lymphadenopathy, supple  Respiratory: CTA jade  Cardiovascular: S1S2 RRR, no murmurs  Gastrointestinal:soft, (+) BS, no HSM. de leon in situ   Extremities:no phlebitis        LABS:                        10.5   23.95 )-----------( 183      ( 15 Oct 2018 11:32 )             32.2     10-15    147<H>  |  112<H>  |  38<H>  ----------------------------<  242<H>  4.7   |  19  |  1.9<H>    Ca    8.3<L>      15 Oct 2018 11:32    TPro  5.1<L>  /  Alb  2.3<L>  /  TBili  0.4  /  DBili  x   /  AST  11  /  ALT  6   /  AlkPhos  113  10-14            MICROBIOLOGY:    Culture - Urine (collected 10-13-18 @ 15:42)  Source: .Urine Catheterized  Final Report (10-15-18 @ 21:47):    50,000 - 99,000 CFU/mL Enterococcus faecalis    10,000 - 49,000 CFU/mL Morganella morganii  Organism: Enterococcus faecalis  Morganella morganii (10-15-18 @ 21:47)  Organism: Morganella morganii (10-15-18 @ 21:47)      -  Amikacin: S <=8      -  Amoxicillin/Clavulanic Acid: R >16/8      -  Ampicillin: R >16 These ampicillin results predict results for amoxicillin      -  Ampicillin/Sulbactam: R >16/8      -  Aztreonam: S <=4      -  Cefazolin: R >16      -  Cefepime: S <=2      -  Cefoxitin: S 8      -  Ceftriaxone: S <=1 Enterobacter, Citrobacter, and Serratia may develop resistance during prolonged therapy      -  Ciprofloxacin: S <=0.5      -  Ertapenem: S <=0.5      -  Gentamicin: S <=1      -  Imipenem: S <=1      -  Levofloxacin: S <=1      -  Meropenem: S <=1      -  Nitrofurantoin: R >64 Should not be used to treat pyelonephritis      -  Piperacillin/Tazobactam: S <=8      -  Tigecycline: R <=1      -  Tobramycin: S <=2      -  Trimethoprim/Sulfamethoxazole: S <=0.5/9.5      Method Type: CARLOS  Organism: Enterococcus faecalis (10-15-18 @ 21:47)      -  Ampicillin: S <=2 Predicts results to ampicillin/sulbactam, amoxacillin-clavulanate and  piperacillin-tazobactam.      -  Ciprofloxacin: S <=1      -  Levofloxacin: S 1      -  Nitrofurantoin: S <=32 Should not be used to treat pyelonephritis.      -  Tetra/Doxy: S <=1      -  Vancomycin: S 4      Method Type: CARLOS    Culture - Blood (collected 10-13-18 @ 11:26)  Source: .Blood None  Preliminary Report (10-14-18 @ 18:01):    No growth to date.        Culture - Urine (collected 13 Oct 2018 15:42)  Source: .Urine Catheterized  Final Report (15 Oct 2018 21:47):    50,000 - 99,000 CFU/mL Enterococcus faecalis    10,000 - 49,000 CFU/mL Morganella morganii  Organism: Enterococcus faecalis  Morganella morganii (15 Oct 2018 21:47)  Organism: Morganella morganii (15 Oct 2018 21:47)  Organism: Enterococcus faecalis (15 Oct 2018 21:47)    Culture - Blood (collected 13 Oct 2018 11:26)  Source: .Blood None  Preliminary Report (14 Oct 2018 18:01):    No growth to date.      Culture Results:   50,000 - 99,000 CFU/mL Enterococcus faecalis  10,000 - 49,000 CFU/mL Morganella morganii (10-13 @ 15:42)  Culture Results:   No growth to date. (10-13 @ 11:26)        RADIOLOGY & ADDITIONAL STUDIES:

## 2018-10-16 NOTE — PROGRESS NOTE ADULT - SUBJECTIVE AND OBJECTIVE BOX
YAZAN WALKER  83y  Male      Patient is a 83y old  Male who presents with a chief complaint of urinary retention (16 Oct 2018 07:05)    gross hematuria on de leon with CBI    REVIEW OF SYSTEMS:  CONSTITUTIONAL: No fever, weight loss, or fatigue  EYES: No eye pain, visual disturbances, or discharge  ENMT:  No difficulty hearing, tinnitus, vertigo; No sinus or throat pain  NECK: No pain or stiffness  BREASTS: No pain, masses, or nipple discharge  RESPIRATORY: No cough, wheezing, chills or hemoptysis; No shortness of breath  CARDIOVASCULAR: No chest pain, palpitations, dizziness, or leg swelling  GASTROINTESTINAL: No abdominal or epigastric pain. No nausea, vomiting, or hematemesis; No diarrhea or constipation. No melena or hematochezia.  GENITOURINARY: No dysuria, frequency, hematuria, +  NEUROLOGICAL: No headaches, memory loss, loss of strength, numbness, or tremors  SKIN: No itching, burning, rashes, or lesions   LYMPH NODES: No enlarged glands  ENDOCRINE: No heat or cold intolerance; No hair loss  MUSCULOSKELETAL: No joint pain or swelling; No muscle, back, or extremity pain  PSYCHIATRIC: No depression, anxiety, mood swings, or difficulty sleeping  HEME/LYMPH: No easy bruising, or bleeding gums  ALLERY AND IMMUNOLOGIC: No hives or eczema  FAMILY HISTORY:  No pertinent family history in first degree relatives    T(C): 35.9 (10-16-18 @ 05:58), Max: 36.3 (10-15-18 @ 14:00)  HR: 80 (10-16-18 @ 05:58) (80 - 92)  BP: 126/63 (10-16-18 @ 05:58) (124/67 - 128/62)  RR: 18 (10-16-18 @ 05:58) (16 - 18)  SpO2: --  Wt(kg): --Vital Signs Last 24 Hrs  T(C): 35.9 (16 Oct 2018 05:58), Max: 36.3 (15 Oct 2018 14:00)  T(F): 96.7 (16 Oct 2018 05:58), Max: 97.4 (15 Oct 2018 14:00)  HR: 80 (16 Oct 2018 05:58) (80 - 92)  BP: 126/63 (16 Oct 2018 05:58) (124/67 - 128/62)  BP(mean): --  RR: 18 (16 Oct 2018 05:58) (16 - 18)  SpO2: --  No Known Allergies      PHYSICAL EXAM:  GENERAL: NAD,   HEAD:  Atraumatic, Normocephalic  EYES: EOMI, PERRLA, conjunctiva and sclera clear  ENMT: No tonsillar erythema, exudates, or enlargement;   NECK: Supple, No JVD, Normal thyroid  NERVOUS SYSTEM:  Alert & Oriented X3,   CHEST/LUNG: Clear to percussion bilaterally; No rales, rhonchi, wheezing, or rubs  HEART: Regular rate and rhythm; No murmurs, rubs, or gallops  ABDOMEN: Soft, Nontender, Nondistended; Bowel sounds present  EXTREMITIES:  2+ Peripheral Pulses, No clubbing, cyanosis, or edema  LYMPH: No lymphadenopathy noted  SKIN: No rashes or lesions      LABS:  10-15    147<H>  |  112<H>  |  38<H>  ----------------------------<  242<H>  4.7   |  19  |  1.9<H>    Ca    8.3<L>      15 Oct 2018 11:32    TPro  5.1<L>  /  Alb  2.3<L>  /  TBili  0.4  /  DBili  x   /  AST  11  /  ALT  6   /  AlkPhos  113  10-14                          10.5   23.95 )-----------( 183      ( 15 Oct 2018 11:32 )             32.2         RADIOLOGY & ADDITIONAL TESTS:    MEDICATION:  acetaminophen   Tablet .. 650 milliGRAM(s) Oral every 6 hours PRN  amLODIPine   Tablet 5 milliGRAM(s) Oral daily  amoxicillin      Capsule 250 milliGRAM(s) Oral every 8 hours  atorvastatin 40 milliGRAM(s) Oral at bedtime  ciprofloxacin     Tablet 250 milliGRAM(s) Oral two times a day  dextrose 40% Gel 15 Gram(s) Oral once PRN  dextrose 5%. 1000 milliLiter(s) IV Continuous <Continuous>  dextrose 50% Injectable 12.5 Gram(s) IV Push once  dextrose 50% Injectable 25 Gram(s) IV Push once  dextrose 50% Injectable 25 Gram(s) IV Push once  donepezil 10 milliGRAM(s) Oral at bedtime  ferrous    sulfate 325 milliGRAM(s) Oral three times a day  glucagon  Injectable 1 milliGRAM(s) IntraMuscular once PRN  insulin lispro (HumaLOG) corrective regimen sliding scale   SubCutaneous three times a day before meals  insulin lispro Injectable (HumaLOG) 2 Unit(s) SubCutaneous three times a day before meals  memantine 10 milliGRAM(s) Oral two times a day  sodium chloride 0.9%. 1000 milliLiter(s) IV Continuous <Continuous>  tamsulosin 0.4 milliGRAM(s) Oral at bedtime      HEALTH ISSUES - PROBLEM Dx:  Urinary tract infection with hematuria, site unspecified: Urinary tract infection with hematuria, site unspecified ON CIPRO,REPEAT CBC  Dementia: Dementia  DM (diabetes mellitus): DM (diabetes mellitus)  HTN (hypertension): HTN (hypertension)  Anemia: Anemia ON FESO4  Urinary retention: Urinary retention ON DE LEON  Hematuria: Hematuria ON CBI  dm ON SLIDING SCALE

## 2018-10-16 NOTE — PHYSICAL THERAPY INITIAL EVALUATION ADULT - FUNCTIONAL LIMITATIONS, PT EVAL
The skin at the access site was anesthetized.  Using a micropuncture needle with the Modified Seldinger technique the right femoral artery was succesfully accessed times 1 in a retrograde fashion over the guidewire, under fluoroscopic guidance using a Introducer Shth 6fr 50cm 11cm Grn Hub Snpft Dil.  self-care

## 2018-10-16 NOTE — PHYSICAL THERAPY INITIAL EVALUATION ADULT - GENERAL OBSERVATIONS, REHAB EVAL
8:32 - 8:53. Chart reviewed. Patient available to be seen for physical therapy, confirmed with nurse. Patient encountered semi-reclined in bed, +IV, +de leon, +CBI.  Patient denies pain at rest, would like to walk with PT now.

## 2018-10-16 NOTE — PHYSICAL THERAPY INITIAL EVALUATION ADULT - IMPAIRMENTS FOUND, PT EVAL
posture/aerobic capacity/endurance/ergonomics and body mechanics/muscle strength/gait, locomotion, and balance

## 2018-10-16 NOTE — PHYSICAL THERAPY INITIAL EVALUATION ADULT - GAIT DEVIATIONS NOTED, PT EVAL
narrow base of support, forward flexed posture/decreased slade/decreased step length/decreased weight-shifting ability

## 2018-10-16 NOTE — PROGRESS NOTE ADULT - ASSESSMENT
Patient is seen and examined, events over the last 24 h noted .  urine remains pain with clots in tubing.  Plan by Dr Rangel for possible cystoscopy tomorrow.     NPO after midnight  cysto by Dr rangel tomorrow to assess heavy bleeding which required multiple transfusions.

## 2018-10-16 NOTE — PROGRESS NOTE ADULT - SUBJECTIVE AND OBJECTIVE BOX
Nephrology progress note    Patient is seen and examined, events over the last 24 h noted .  Pt still on CBI, urine is pink    Allergies:  No Known Allergies    Hospital Medications:   MEDICATIONS  (STANDING):  amLODIPine   Tablet 5 milliGRAM(s) Oral daily  amoxicillin      Capsule 250 milliGRAM(s) Oral every 8 hours  atorvastatin 40 milliGRAM(s) Oral at bedtime  ciprofloxacin     Tablet 250 milliGRAM(s) Oral two times a day  dextrose 5%. 1000 milliLiter(s) (50 mL/Hr) IV Continuous <Continuous>  dextrose 50% Injectable 12.5 Gram(s) IV Push once  dextrose 50% Injectable 25 Gram(s) IV Push once  dextrose 50% Injectable 25 Gram(s) IV Push once  donepezil 10 milliGRAM(s) Oral at bedtime  ferrous    sulfate 325 milliGRAM(s) Oral three times a day  insulin lispro (HumaLOG) corrective regimen sliding scale   SubCutaneous three times a day before meals  insulin lispro Injectable (HumaLOG) 2 Unit(s) SubCutaneous three times a day before meals  memantine 10 milliGRAM(s) Oral two times a day  sodium chloride 0.9%. 1000 milliLiter(s) (50 mL/Hr) IV Continuous <Continuous>  tamsulosin 0.4 milliGRAM(s) Oral at bedtime        VITALS:  T(F): 96.7 (10-16-18 @ 05:58), Max: 97.4 (10-15-18 @ 14:00)  HR: 80 (10-16-18 @ 05:58)  BP: 126/63 (10-16-18 @ 05:58)  RR: 18 (10-16-18 @ 05:58)  SpO2: --  Wt(kg): --    10-14 @ :  -  10-15 @ 07:00  --------------------------------------------------------  IN: 32658 mL / OUT: 69273 mL / NET: 780 mL    10-15 @ 07:01  -  10-16 @ 07:00  --------------------------------------------------------  IN: 65423 mL / OUT: 98705 mL / NET: -77252 mL          PHYSICAL EXAM:  Constitutional: NAD  HEENT: anicteric sclera, oropharynx clear, MMM  Neck: No JVD  Respiratory: CTAB, no wheezes, rales or rhonchi  Cardiovascular: S1, S2, RRR  Gastrointestinal: BS+, soft, NT/ND  Extremities: No leg edema  Neurological: Awake alert  :  de leon with pink urine.   Skin: No rashes  Vascular Access:    LABS:  10-15    147<H>  |  112<H>  |  38<H>  ----------------------------<  242<H>  4.7   |  19  |  1.9<H>  Creatinine Trend: 1.9<--, 2.0<--, 2.6<--, 2.5<--, 1.7<--, 1.5<--    Ca    8.3<L>      15 Oct 2018 11:32    TPro  5.1<L>  /  Alb  2.3<L>  /  TBili  0.4  /  DBili      /  AST  11  /  ALT  6   /  AlkPhos  113  10-14                          10.5   23.95 )-----------( 183      ( 15 Oct 2018 11:32 )             32.2       Urine Studies:  Urinalysis Basic - ( 13 Oct 2018 15:42 )    Color: Red / Appearance: Other / S.010 / pH:   Gluc:  / Ketone: 40  / Bili: Large / Urobili: >=8.0   Blood:  / Protein: >=300 / Nitrite: Positive   Leuk Esterase: Large / RBC: >50 /HPF / WBC >50 /HPF   Sq Epi:  / Non Sq Epi:  / Bacteria: Many        RADIOLOGY & ADDITIONAL STUDIES:

## 2018-10-17 ENCOUNTER — RESULT REVIEW (OUTPATIENT)
Age: 83
End: 2018-10-17

## 2018-10-17 LAB
ALLERGY+IMMUNOLOGY DIAG STUDY NOTE: SIGNIFICANT CHANGE UP
ANION GAP SERPL CALC-SCNC: 14 MMOL/L — SIGNIFICANT CHANGE UP (ref 7–14)
BUN SERPL-MCNC: 24 MG/DL — HIGH (ref 10–20)
CALCIUM SERPL-MCNC: 8.4 MG/DL — LOW (ref 8.5–10.1)
CHLORIDE SERPL-SCNC: 113 MMOL/L — HIGH (ref 98–110)
CO2 SERPL-SCNC: 21 MMOL/L — SIGNIFICANT CHANGE UP (ref 17–32)
CREAT SERPL-MCNC: 1.3 MG/DL — SIGNIFICANT CHANGE UP (ref 0.7–1.5)
GLUCOSE BLDC GLUCOMTR-MCNC: 192 MG/DL — HIGH (ref 70–99)
GLUCOSE BLDC GLUCOMTR-MCNC: 196 MG/DL — HIGH (ref 70–99)
GLUCOSE BLDC GLUCOMTR-MCNC: 214 MG/DL — HIGH (ref 70–99)
GLUCOSE SERPL-MCNC: 205 MG/DL — HIGH (ref 70–99)
HCT VFR BLD CALC: 26.9 % — LOW (ref 42–52)
HGB BLD-MCNC: 8.8 G/DL — LOW (ref 14–18)
MCHC RBC-ENTMCNC: 29.9 PG — SIGNIFICANT CHANGE UP (ref 27–31)
MCHC RBC-ENTMCNC: 32.7 G/DL — SIGNIFICANT CHANGE UP (ref 32–37)
MCV RBC AUTO: 91.5 FL — SIGNIFICANT CHANGE UP (ref 80–94)
NRBC # BLD: 0 /100 WBCS — SIGNIFICANT CHANGE UP (ref 0–0)
PLATELET # BLD AUTO: 283 K/UL — SIGNIFICANT CHANGE UP (ref 130–400)
POTASSIUM SERPL-MCNC: 4 MMOL/L — SIGNIFICANT CHANGE UP (ref 3.5–5)
POTASSIUM SERPL-SCNC: 4 MMOL/L — SIGNIFICANT CHANGE UP (ref 3.5–5)
RBC # BLD: 2.94 M/UL — LOW (ref 4.7–6.1)
RBC # FLD: 13.7 % — SIGNIFICANT CHANGE UP (ref 11.5–14.5)
SODIUM SERPL-SCNC: 148 MMOL/L — HIGH (ref 135–146)
TYPE + AB SCN PNL BLD: SIGNIFICANT CHANGE UP
WBC # BLD: 16.5 K/UL — HIGH (ref 4.8–10.8)
WBC # FLD AUTO: 16.5 K/UL — HIGH (ref 4.8–10.8)

## 2018-10-17 PROCEDURE — 52601 PROSTATECTOMY (TURP): CPT

## 2018-10-17 RX ORDER — ONDANSETRON 8 MG/1
4 TABLET, FILM COATED ORAL ONCE
Qty: 0 | Refills: 0 | Status: DISCONTINUED | OUTPATIENT
Start: 2018-10-17 | End: 2018-10-24

## 2018-10-17 RX ORDER — SODIUM CHLORIDE 9 MG/ML
1000 INJECTION INTRAMUSCULAR; INTRAVENOUS; SUBCUTANEOUS
Qty: 0 | Refills: 0 | Status: DISCONTINUED | OUTPATIENT
Start: 2018-10-17 | End: 2018-10-19

## 2018-10-17 RX ORDER — MORPHINE SULFATE 50 MG/1
2 CAPSULE, EXTENDED RELEASE ORAL
Qty: 0 | Refills: 0 | Status: DISCONTINUED | OUTPATIENT
Start: 2018-10-17 | End: 2018-10-24

## 2018-10-17 RX ORDER — SODIUM CHLORIDE 9 MG/ML
1000 INJECTION, SOLUTION INTRAVENOUS
Qty: 0 | Refills: 0 | Status: DISCONTINUED | OUTPATIENT
Start: 2018-10-17 | End: 2018-10-18

## 2018-10-17 RX ADMIN — SODIUM CHLORIDE 75 MILLILITER(S): 9 INJECTION INTRAMUSCULAR; INTRAVENOUS; SUBCUTANEOUS at 16:44

## 2018-10-17 RX ADMIN — AMLODIPINE BESYLATE 5 MILLIGRAM(S): 2.5 TABLET ORAL at 05:56

## 2018-10-17 RX ADMIN — ATORVASTATIN CALCIUM 40 MILLIGRAM(S): 80 TABLET, FILM COATED ORAL at 21:02

## 2018-10-17 RX ADMIN — SODIUM CHLORIDE 75 MILLILITER(S): 9 INJECTION INTRAMUSCULAR; INTRAVENOUS; SUBCUTANEOUS at 21:01

## 2018-10-17 RX ADMIN — DONEPEZIL HYDROCHLORIDE 10 MILLIGRAM(S): 10 TABLET, FILM COATED ORAL at 21:02

## 2018-10-17 RX ADMIN — Medication 2 UNIT(S): at 18:05

## 2018-10-17 RX ADMIN — Medication 325 MILLIGRAM(S): at 21:02

## 2018-10-17 RX ADMIN — MEMANTINE HYDROCHLORIDE 10 MILLIGRAM(S): 10 TABLET ORAL at 05:57

## 2018-10-17 RX ADMIN — Medication 250 MILLIGRAM(S): at 18:02

## 2018-10-17 RX ADMIN — Medication 250 MILLIGRAM(S): at 21:02

## 2018-10-17 RX ADMIN — Medication 250 MILLIGRAM(S): at 05:58

## 2018-10-17 RX ADMIN — MEMANTINE HYDROCHLORIDE 10 MILLIGRAM(S): 10 TABLET ORAL at 18:02

## 2018-10-17 RX ADMIN — SODIUM CHLORIDE 100 MILLILITER(S): 9 INJECTION, SOLUTION INTRAVENOUS at 06:00

## 2018-10-17 RX ADMIN — Medication 250 MILLIGRAM(S): at 05:57

## 2018-10-17 RX ADMIN — TAMSULOSIN HYDROCHLORIDE 0.4 MILLIGRAM(S): 0.4 CAPSULE ORAL at 21:02

## 2018-10-17 RX ADMIN — Medication 325 MILLIGRAM(S): at 05:57

## 2018-10-17 RX ADMIN — Medication 1: at 18:06

## 2018-10-17 NOTE — BRIEF OPERATIVE NOTE - OPERATION/FINDINGS
clot retention - 200 ml of blood clots in bladder  >200 cc prostate hypertrophy with large median lobe with varices and active bleeding

## 2018-10-17 NOTE — PROGRESS NOTE ADULT - SUBJECTIVE AND OBJECTIVE BOX
S; No significant events overnight. Still with hematuria; for OR today  O; Vital Signs Last 24 Hrs  T(C): 36.4 (17 Oct 2018 06:15), Max: 37.2 (16 Oct 2018 22:27)  T(F): 97.6 (17 Oct 2018 06:15), Max: 98.9 (16 Oct 2018 22:27)  HR: 87 (17 Oct 2018 06:15) (71 - 87)  BP: 122/62 (17 Oct 2018 06:15) (116/58 - 123/61)  BP(mean): --  RR: 16 (17 Oct 2018 06:15) (16 - 16)  SpO2: --    I&O's Summary    16 Oct 2018 07:01  -  17 Oct 2018 07:00  --------------------------------------------------------  IN: 27026 mL / OUT: 04889 mL / NET: 3070 mL    exam:  abd; soft nt nd    labs:                         8.8    16.50 )-----------( 283      ( 17 Oct 2018 08:44 )             26.9     Hemoglobin: 8.8 g/dL (10-17 @ 08:44)  Hemoglobin: 10.3 g/dL (10-16 @ 08:02)  Hemoglobin: 10.5 g/dL (10-15 @ 11:32)  Hemoglobin: 10.5 g/dL (10-15 @ 07:57)  Hemoglobin: 8.8 g/dL (10-14 @ 18:49)  Hemoglobin: 6.7 g/dL (10-14 @ 09:02)  Hemoglobin: 7.2 g/dL (10-13 @ 15:17)  Hemoglobin: 7.6 g/dL (10-13 @ 07:19)  Hemoglobin: 7.6 g/dL (10-12 @ 15:57)     10-17    148<H>  |  113<H>  |  24<H>  ----------------------------<  205<H>  4.0   |  21  |  1.3    Ca    8.4<L>      17 Oct 2018 08:44

## 2018-10-17 NOTE — BRIEF OPERATIVE NOTE - PRE-OP DX
Gross hematuria  10/17/2018    Jazmyn Sepulveda  Urinary retention due to benign prostatic hyperplasia  10/17/2018    Jazmyn Sepulveda

## 2018-10-17 NOTE — CHART NOTE - NSCHARTNOTEFT_GEN_A_CORE
PACU ANESTHESIA ADMISSION NOTE      Procedure: Evacuation of clots from bladder  TURP, using bipolar cautery probe    Post op diagnosis:  Prostate varices  BPH with urinary obstruction  Clot retention of urine      ____  Intubated  TV:______       Rate: ______      FiO2: ______    _x___  Patent Airway    _x___  Full return of protective reflexes    __x__  Full recovery from anesthesia / back to baseline status    Vitals:  T(C): 36.4 (10-17-18 @ 12:31), Max: 37.2 (10-16-18 @ 22:27)  HR: 87 (10-17-18 @ 12:31) (87 - 87)  BP: 122/62 (10-17-18 @ 12:31) (122/62 - 123/61)  RR: 16 (10-17-18 @ 12:31) (16 - 16)  SpO2: --    Mental Status:  _x___ Awake   _____ Alert   _____ Drowsy   _____ Sedated    Nausea/Vomiting:  __x__ NO  ______Yes,   See Post - Op Orders          Pain Scale (0-10):  _____    Treatment: __x__ None    ____ See Post - Op/PCA Orders    Post - Operative Fluids:   ____ Oral   _x___ See Post - Op Orders    Plan: Discharge:   ____Home       __x___Floor     _____Critical Care    _____  Other:_________________    Comments: uneventful anesthesia course no complications. VItals stable. Pt transferred to PACU

## 2018-10-17 NOTE — PROGRESS NOTE ADULT - SUBJECTIVE AND OBJECTIVE BOX
YAZAN WALKER  83y  Male      Patient is a 83y old  Male who presents with a chief complaint of urinary retention (16 Oct 2018 17:06)    gross hematuria    REVIEW OF SYSTEMS:  CONSTITUTIONAL: No fever, weight loss, or fatigue  EYES: No eye pain, visual disturbances, or discharge  ENMT:  No difficulty hearing, tinnitus, vertigo; No sinus or throat pain  NECK: No pain or stiffness  BREASTS: No pain, masses, or nipple discharge  RESPIRATORY: No cough, wheezing, chills or hemoptysis; No shortness of breath  CARDIOVASCULAR: No chest pain, palpitations, dizziness, or leg swelling  GASTROINTESTINAL: No abdominal or epigastric pain. No nausea, vomiting, or hematemesis; No diarrhea or constipation. No melena or hematochezia.  GENITOURINARY: No dysuria, frequency, hematuria, +  NEUROLOGICAL: No headaches, memory loss, loss of strength, numbness, or tremors  SKIN: No itching, burning, rashes, or lesions   LYMPH NODES: No enlarged glands  ENDOCRINE: No heat or cold intolerance; No hair loss  MUSCULOSKELETAL: No joint pain or swelling; No muscle, back, or extremity pain  PSYCHIATRIC: No depression, anxiety, mood swings, or difficulty sleeping  HEME/LYMPH: No easy bruising, or bleeding gums  ALLERY AND IMMUNOLOGIC: No hives or eczema  FAMILY HISTORY:  No pertinent family history in first degree relatives    T(C): 36.4 (10-17-18 @ 06:15), Max: 37.2 (10-16-18 @ 22:27)  HR: 87 (10-17-18 @ 06:15) (71 - 87)  BP: 122/62 (10-17-18 @ 06:15) (116/58 - 123/61)  RR: 16 (10-17-18 @ 06:15) (16 - 16)  SpO2: --  Wt(kg): --Vital Signs Last 24 Hrs  T(C): 36.4 (17 Oct 2018 06:15), Max: 37.2 (16 Oct 2018 22:27)  T(F): 97.6 (17 Oct 2018 06:15), Max: 98.9 (16 Oct 2018 22:27)  HR: 87 (17 Oct 2018 06:15) (71 - 87)  BP: 122/62 (17 Oct 2018 06:15) (116/58 - 123/61)  BP(mean): --  RR: 16 (17 Oct 2018 06:15) (16 - 16)  SpO2: --  No Known Allergies      PHYSICAL EXAM:  GENERAL: NAD,   HEAD:  Atraumatic, Normocephalic  EYES: EOMI, PERRLA, conjunctiva and sclera clear  ENMT: No tonsillar erythema, exudates, or enlargement; Moist mucous membranes,   NECK: Supple, No JVD, Normal thyroid  NERVOUS SYSTEM:  Alert & Oriented X3, Good concentration;CHEST/LUNG: Clear to percussion bilaterally; No rales, rhonchi, wheezing, or rubs  HEART: Regular rate and rhythm; No murmurs, rubs, or gallops  ABDOMEN: Soft, Nontender, Nondistended; Bowel sounds present  EXTREMITIES:  2+ Peripheral Pulses, No clubbing, cyanosis, or edema  LYMPH: No lymphadenopathy noted  SKIN: No rashes or lesions      LABS:  10-16    149<H>  |  113<H>  |  29<H>  ----------------------------<  197<H>  4.3   |  19  |  1.4    Ca    8.4<L>      16 Oct 2018 08:02                            10.3   21.60 )-----------( 297      ( 16 Oct 2018 08:02 )             32.0         RADIOLOGY & ADDITIONAL TESTS:    MEDICATION:  acetaminophen   Tablet .. 650 milliGRAM(s) Oral every 6 hours PRN  amLODIPine   Tablet 5 milliGRAM(s) Oral daily  amoxicillin      Capsule 250 milliGRAM(s) Oral every 8 hours  atorvastatin 40 milliGRAM(s) Oral at bedtime  ciprofloxacin     Tablet 250 milliGRAM(s) Oral two times a day  dextrose 40% Gel 15 Gram(s) Oral once PRN  dextrose 5%. 1000 milliLiter(s) IV Continuous <Continuous>  dextrose 50% Injectable 12.5 Gram(s) IV Push once  dextrose 50% Injectable 25 Gram(s) IV Push once  dextrose 50% Injectable 25 Gram(s) IV Push once  donepezil 10 milliGRAM(s) Oral at bedtime  ferrous    sulfate 325 milliGRAM(s) Oral three times a day  glucagon  Injectable 1 milliGRAM(s) IntraMuscular once PRN  insulin lispro (HumaLOG) corrective regimen sliding scale   SubCutaneous three times a day before meals  insulin lispro Injectable (HumaLOG) 2 Unit(s) SubCutaneous three times a day before meals  memantine 10 milliGRAM(s) Oral two times a day  sodium chloride 0.45%. 1000 milliLiter(s) IV Continuous <Continuous>  tamsulosin 0.4 milliGRAM(s) Oral at bedtime      HEALTH ISSUES - PROBLEM Dx:  Urinary tract infection with hematuria, site unspecified: Urinary tract infection with hematuria, site unspecified  Dementia: Dementia  DM (diabetes mellitus): DM (diabetes mellitus)  HTN (hypertension): HTN (hypertension)  Anemia: Anemia due to acute blood loss  Urinary retention: Urinary retention on de leon  Hematuria: Hematuria still has clots on cbi,going for cystoscopy today,medically stable

## 2018-10-17 NOTE — PROGRESS NOTE ADULT - ASSESSMENT
Hematuria    1. Continue NPO - for cysto by Dr rangel today  2. Will need consent from patient's daughter, Rolanda White, [phone]

## 2018-10-17 NOTE — BRIEF OPERATIVE NOTE - PROCEDURE
<<-----Click on this checkbox to enter Procedure TURP, using bipolar cautery probe  10/17/2018    Active  [email or web address]  Evacuation of clots from bladder  10/17/2018    Active  [email or web address]

## 2018-10-17 NOTE — CHART NOTE - NSCHARTNOTEFT_GEN_A_CORE
82 y/o male s/p TURP POD#0. Seen and examined, NAD, in bed, no complaints. CBI running light fruit punch color, no clots.   A/P: will continue to monitor overnight.

## 2018-10-18 LAB
ANION GAP SERPL CALC-SCNC: 15 MMOL/L — HIGH (ref 7–14)
BUN SERPL-MCNC: 20 MG/DL — SIGNIFICANT CHANGE UP (ref 10–20)
CALCIUM SERPL-MCNC: 7.5 MG/DL — LOW (ref 8.5–10.1)
CHLORIDE SERPL-SCNC: 113 MMOL/L — HIGH (ref 98–110)
CO2 SERPL-SCNC: 20 MMOL/L — SIGNIFICANT CHANGE UP (ref 17–32)
CREAT SERPL-MCNC: 1.3 MG/DL — SIGNIFICANT CHANGE UP (ref 0.7–1.5)
CULTURE RESULTS: SIGNIFICANT CHANGE UP
GLUCOSE BLDC GLUCOMTR-MCNC: 183 MG/DL — HIGH (ref 70–99)
GLUCOSE BLDC GLUCOMTR-MCNC: 196 MG/DL — HIGH (ref 70–99)
GLUCOSE BLDC GLUCOMTR-MCNC: 213 MG/DL — HIGH (ref 70–99)
GLUCOSE BLDC GLUCOMTR-MCNC: 217 MG/DL — HIGH (ref 70–99)
GLUCOSE BLDC GLUCOMTR-MCNC: 218 MG/DL — HIGH (ref 70–99)
GLUCOSE SERPL-MCNC: 165 MG/DL — HIGH (ref 70–99)
HCT VFR BLD CALC: 25.4 % — LOW (ref 42–52)
HGB BLD-MCNC: 8 G/DL — LOW (ref 14–18)
MCHC RBC-ENTMCNC: 29.3 PG — SIGNIFICANT CHANGE UP (ref 27–31)
MCHC RBC-ENTMCNC: 31.5 G/DL — LOW (ref 32–37)
MCV RBC AUTO: 93 FL — SIGNIFICANT CHANGE UP (ref 80–94)
NRBC # BLD: 0 /100 WBCS — SIGNIFICANT CHANGE UP (ref 0–0)
PLATELET # BLD AUTO: 279 K/UL — SIGNIFICANT CHANGE UP (ref 130–400)
POTASSIUM SERPL-MCNC: 4.2 MMOL/L — SIGNIFICANT CHANGE UP (ref 3.5–5)
POTASSIUM SERPL-SCNC: 4.2 MMOL/L — SIGNIFICANT CHANGE UP (ref 3.5–5)
RBC # BLD: 2.73 M/UL — LOW (ref 4.7–6.1)
RBC # FLD: 13.7 % — SIGNIFICANT CHANGE UP (ref 11.5–14.5)
SODIUM SERPL-SCNC: 148 MMOL/L — HIGH (ref 135–146)
SPECIMEN SOURCE: SIGNIFICANT CHANGE UP
WBC # BLD: 20.89 K/UL — HIGH (ref 4.8–10.8)
WBC # FLD AUTO: 20.89 K/UL — HIGH (ref 4.8–10.8)

## 2018-10-18 RX ADMIN — ATORVASTATIN CALCIUM 40 MILLIGRAM(S): 80 TABLET, FILM COATED ORAL at 21:44

## 2018-10-18 RX ADMIN — Medication 250 MILLIGRAM(S): at 05:39

## 2018-10-18 RX ADMIN — Medication 2: at 11:59

## 2018-10-18 RX ADMIN — Medication 325 MILLIGRAM(S): at 11:56

## 2018-10-18 RX ADMIN — Medication 325 MILLIGRAM(S): at 05:39

## 2018-10-18 RX ADMIN — Medication 250 MILLIGRAM(S): at 21:45

## 2018-10-18 RX ADMIN — AMLODIPINE BESYLATE 5 MILLIGRAM(S): 2.5 TABLET ORAL at 05:39

## 2018-10-18 RX ADMIN — Medication 250 MILLIGRAM(S): at 18:33

## 2018-10-18 RX ADMIN — Medication 2 UNIT(S): at 11:59

## 2018-10-18 RX ADMIN — Medication 250 MILLIGRAM(S): at 11:56

## 2018-10-18 RX ADMIN — SODIUM CHLORIDE 75 MILLILITER(S): 9 INJECTION INTRAMUSCULAR; INTRAVENOUS; SUBCUTANEOUS at 07:53

## 2018-10-18 RX ADMIN — DONEPEZIL HYDROCHLORIDE 10 MILLIGRAM(S): 10 TABLET, FILM COATED ORAL at 21:44

## 2018-10-18 RX ADMIN — MEMANTINE HYDROCHLORIDE 10 MILLIGRAM(S): 10 TABLET ORAL at 18:33

## 2018-10-18 RX ADMIN — Medication 325 MILLIGRAM(S): at 21:44

## 2018-10-18 RX ADMIN — MEMANTINE HYDROCHLORIDE 10 MILLIGRAM(S): 10 TABLET ORAL at 05:39

## 2018-10-18 RX ADMIN — SODIUM CHLORIDE 75 MILLILITER(S): 9 INJECTION INTRAMUSCULAR; INTRAVENOUS; SUBCUTANEOUS at 21:45

## 2018-10-18 RX ADMIN — TAMSULOSIN HYDROCHLORIDE 0.4 MILLIGRAM(S): 0.4 CAPSULE ORAL at 21:45

## 2018-10-18 NOTE — DIETITIAN INITIAL EVALUATION ADULT. - OTHER INFO
Reason for assessment: LOS. PMH: anemia, dementia, DM, HTN, kidney disease. Pt presented to ED w/ clots in his de leon bag. Pt is admitted for urinary retention. Cystoscopy 10/17. UTI noted. NISHANT on CKD (improving) w/ a baseline creat 1.8 and mild hypernatremia (148) noted. Pt does not know his UBW. He denies any wt loss. Pt denies N/V/D. Pt does not remember his last BM. Denies constipation. NKFA.

## 2018-10-18 NOTE — PROGRESS NOTE ADULT - SUBJECTIVE AND OBJECTIVE BOX
S; Pt denies any pain. On CBI, no clots overnight, did not require any flushing. Having occasional leaking around insertion site, currently dry  O; Vital Signs Last 24 Hrs  T(C): 37.2 (18 Oct 2018 05:00), Max: 37.3 (18 Oct 2018 02:00)  T(F): 98.9 (18 Oct 2018 05:00), Max: 99.1 (18 Oct 2018 02:00)  HR: 93 (18 Oct 2018 05:00) (87 - 106)  BP: 111/62 (18 Oct 2018 05:00) (104/59 - 129/60)  BP(mean): 76 (17 Oct 2018 16:15) (76 - 76)  RR: 18 (18 Oct 2018 05:00) (12 - 22)  SpO2: 98% (18 Oct 2018 05:34) (91% - 98%)    I&O's Detail    17 Oct 2018 07:01  -  18 Oct 2018 07:00  --------------------------------------------------------  IN:    Continuous Bladder Irrigation: 9500 mL    sodium chloride 0.9%.: 600 mL  Total IN: 90946 mL    OUT:    Continuous Bladder Irrigation: 9600 mL (pink tinged in tubing)    Indwelling Catheter - Urethral: 600 mL  Total OUT: 96521 mL    Total NET: -100 mL      MEDICATIONS  (STANDING):  amLODIPine   Tablet 5 milliGRAM(s) Oral daily  amoxicillin      Capsule 250 milliGRAM(s) Oral every 8 hours  atorvastatin 40 milliGRAM(s) Oral at bedtime  ciprofloxacin     Tablet 250 milliGRAM(s) Oral two times a day  dextrose 5%. 1000 milliLiter(s) (50 mL/Hr) IV Continuous <Continuous>  dextrose 50% Injectable 12.5 Gram(s) IV Push once  dextrose 50% Injectable 25 Gram(s) IV Push once  dextrose 50% Injectable 25 Gram(s) IV Push once  donepezil 10 milliGRAM(s) Oral at bedtime  ferrous    sulfate 325 milliGRAM(s) Oral three times a day  insulin lispro (HumaLOG) corrective regimen sliding scale   SubCutaneous three times a day before meals  insulin lispro Injectable (HumaLOG) 2 Unit(s) SubCutaneous three times a day before meals  memantine 10 milliGRAM(s) Oral two times a day  sodium chloride 0.9%. 1000 milliLiter(s) (75 mL/Hr) IV Continuous <Continuous>  tamsulosin 0.4 milliGRAM(s) Oral at bedtime    MEDICATIONS  (PRN):  acetaminophen   Tablet .. 650 milliGRAM(s) Oral every 6 hours PRN Temp greater or equal to 38C (100.4F), Mild Pain (1 - 3)  dextrose 40% Gel 15 Gram(s) Oral once PRN Blood Glucose LESS THAN 70 milliGRAM(s)/deciliter  glucagon  Injectable 1 milliGRAM(s) IntraMuscular once PRN Glucose LESS THAN 70 milligrams/deciliter  morphine  - Injectable 2 milliGRAM(s) IV Push every 15 minutes PRN Severe Pain (7 - 10)  ondansetron Injectable 4 milliGRAM(s) IV Push once PRN Nausea and/or Vomiting      EXAM:  abd: soft, NT/ND; de leon in place, pink tinged urine in tubing    LABS:                        8.0    20.89 )-----------( 279      ( 18 Oct 2018 09:43 )             25.4       Hemoglobin: 8.0 g/dL (10-18 @ 09:43)  Hemoglobin: 8.8 g/dL (10-17 @ 08:44)  Hemoglobin: 10.3 g/dL (10-16 @ 08:02)    10-18    148<H>  |  113<H>  |  20  ----------------------------<  165<H>  4.2   |  20  |  1.3    Ca    7.5<L>      18 Oct 2018 09:43      RECENT CULTURES:  10-13 @ 15:42 .Urine Catheterized Enterococcus faecalis  Morganella morganii Enterococcus faecalis   50,000 - 99,000 CFU/mL Enterococcus faecalis  10,000 - 49,000 CFU/mL Morganella morganii   10-13 @ 11:26 .Blood None     No growth to date.

## 2018-10-18 NOTE — PROGRESS NOTE ADULT - SUBJECTIVE AND OBJECTIVE BOX
YAZAN WALKER  83y  Male      Patient is a 83y old  Male who presents with a chief complaint of urinary retention (17 Oct 2018 09:43)    s/p cystoscopy    REVIEW OF SYSTEMS:  CONSTITUTIONAL: No fever, weight loss, or fatigue  EYES: No eye pain, visual disturbances, or discharge  ENMT:  No difficulty hearing, tinnitus, vertigo; No sinus or throat pain  NECK: No pain or stiffness  BREASTS: No pain, masses, or nipple discharge  RESPIRATORY: No cough, wheezing, chills or hemoptysis; No shortness of breath  CARDIOVASCULAR: No chest pain, palpitations, dizziness, or leg swelling  GASTROINTESTINAL: No abdominal or epigastric pain. No nausea, vomiting, or hematemesis; No diarrhea or constipation. No melena or hematochezia.  GENITOURINARY: No dysuria, frequency, hematuria, +  NEUROLOGICAL: No headaches, memory loss, loss of strength, numbness, or tremors  SKIN: No itching, burning, rashes, or lesions   LYMPH NODES: No enlarged glands  ENDOCRINE: No heat or cold intolerance; No hair loss  MUSCULOSKELETAL: No joint pain or swelling; No muscle, back, or extremity pain  PSYCHIATRIC: No depression, anxiety, mood swings, or difficulty sleeping  HEME/LYMPH: No easy bruising, or bleeding gums  ALLERY AND IMMUNOLOGIC: No hives or eczema  FAMILY HISTORY:  No pertinent family history in first degree relatives    T(C): 37.2 (10-18-18 @ 05:00), Max: 37.3 (10-18-18 @ 02:00)  HR: 93 (10-18-18 @ 05:00) (87 - 106)  BP: 111/62 (10-18-18 @ 05:00) (104/59 - 129/60)  RR: 18 (10-18-18 @ 05:00) (12 - 22)  SpO2: 98% (10-18-18 @ 05:34) (91% - 98%)  Wt(kg): --Vital Signs Last 24 Hrs  T(C): 37.2 (18 Oct 2018 05:00), Max: 37.3 (18 Oct 2018 02:00)  T(F): 98.9 (18 Oct 2018 05:00), Max: 99.1 (18 Oct 2018 02:00)  HR: 93 (18 Oct 2018 05:00) (87 - 106)  BP: 111/62 (18 Oct 2018 05:00) (104/59 - 129/60)  BP(mean): 76 (17 Oct 2018 16:15) (76 - 76)  RR: 18 (18 Oct 2018 05:00) (12 - 22)  SpO2: 98% (18 Oct 2018 05:34) (91% - 98%)  No Known Allergies      PHYSICAL EXAM:  GENERAL: NAD,   HEAD:  Atraumatic, Normocephalic  EYES: EOMI, PERRLA, conjunctiva and sclera clear  ENMT: No tonsillar erythema, exudates, or enlargement;  NECK: Supple, No JVD, Normal thyroid  NERVOUS SYSTEM:  Alert   CHEST/LUNG: Clear to percussion bilaterally; No rales, rhonchi, wheezing, or rubs  HEART: Regular rate and rhythm; No murmurs, rubs, or gallops  ABDOMEN: Soft, Nontender, Nondistended; Bowel sounds present  EXTREMITIES:  2+ Peripheral Pulses, No clubbing, cyanosis, or edema  LYMPH: No lymphadenopathy noted  SKIN: No rashes or lesions      LABS:  10-17    148<H>  |  113<H>  |  24<H>  ----------------------------<  205<H>  4.0   |  21  |  1.3    Ca    8.4<L>      17 Oct 2018 08:44                            8.8    16.50 )-----------( 283      ( 17 Oct 2018 08:44 )             26.9         RADIOLOGY & ADDITIONAL TESTS:    MEDICATION:  acetaminophen   Tablet .. 650 milliGRAM(s) Oral every 6 hours PRN  amLODIPine   Tablet 5 milliGRAM(s) Oral daily  amoxicillin      Capsule 250 milliGRAM(s) Oral every 8 hours  atorvastatin 40 milliGRAM(s) Oral at bedtime  ciprofloxacin     Tablet 250 milliGRAM(s) Oral two times a day  dextrose 40% Gel 15 Gram(s) Oral once PRN  dextrose 5%. 1000 milliLiter(s) IV Continuous <Continuous>  dextrose 50% Injectable 12.5 Gram(s) IV Push once  dextrose 50% Injectable 25 Gram(s) IV Push once  dextrose 50% Injectable 25 Gram(s) IV Push once  donepezil 10 milliGRAM(s) Oral at bedtime  ferrous    sulfate 325 milliGRAM(s) Oral three times a day  glucagon  Injectable 1 milliGRAM(s) IntraMuscular once PRN  insulin lispro (HumaLOG) corrective regimen sliding scale   SubCutaneous three times a day before meals  insulin lispro Injectable (HumaLOG) 2 Unit(s) SubCutaneous three times a day before meals  memantine 10 milliGRAM(s) Oral two times a day  morphine  - Injectable 2 milliGRAM(s) IV Push every 15 minutes PRN  ondansetron Injectable 4 milliGRAM(s) IV Push once PRN  sodium chloride 0.45%. 1000 milliLiter(s) IV Continuous <Continuous>  sodium chloride 0.9%. 1000 milliLiter(s) IV Continuous <Continuous>  tamsulosin 0.4 milliGRAM(s) Oral at bedtime      HEALTH ISSUES - PROBLEM Dx:  Urinary tract infection with hematuria, site unspecified: Urinary tract infection with hematuria, site unspecified cipro,amoxicllin  Dementia: Dementia  DM (diabetes mellitus): DM (diabetes mellitus)  HTN (hypertension): HTN (hypertension)  Anemia: Anemia acute blood losss from hematuria,s/p prbc  Urinary retention: Urinary retention on de leon s/p cystoscopy  Hematuria: Hematuria,slight if remain stable will d/c [facility] in am

## 2018-10-18 NOTE — PROGRESS NOTE ADULT - ASSESSMENT
POD# 1 s/p TURP     - H/H stable   - continue CBI for now; will D/W attending re: further management POD# 1 s/p TURP     - H/H stable   - continue CBI for now; recheck labs in AM 10/19   - will make further recommendations tomorrow

## 2018-10-19 LAB
GLUCOSE BLDC GLUCOMTR-MCNC: 143 MG/DL — HIGH (ref 70–99)
GLUCOSE BLDC GLUCOMTR-MCNC: 152 MG/DL — HIGH (ref 70–99)
GLUCOSE BLDC GLUCOMTR-MCNC: 164 MG/DL — HIGH (ref 70–99)
GLUCOSE BLDC GLUCOMTR-MCNC: 179 MG/DL — HIGH (ref 70–99)
GLUCOSE BLDC GLUCOMTR-MCNC: 184 MG/DL — HIGH (ref 70–99)
GLUCOSE BLDC GLUCOMTR-MCNC: 189 MG/DL — HIGH (ref 70–99)
GLUCOSE BLDC GLUCOMTR-MCNC: 195 MG/DL — HIGH (ref 70–99)
HCT VFR BLD CALC: 27.4 % — LOW (ref 42–52)
HGB BLD-MCNC: 8.6 G/DL — LOW (ref 14–18)
MCHC RBC-ENTMCNC: 29.5 PG — SIGNIFICANT CHANGE UP (ref 27–31)
MCHC RBC-ENTMCNC: 31.4 G/DL — LOW (ref 32–37)
MCV RBC AUTO: 93.8 FL — SIGNIFICANT CHANGE UP (ref 80–94)
NRBC # BLD: 0 /100 WBCS — SIGNIFICANT CHANGE UP (ref 0–0)
PLATELET # BLD AUTO: 290 K/UL — SIGNIFICANT CHANGE UP (ref 130–400)
RBC # BLD: 2.92 M/UL — LOW (ref 4.7–6.1)
RBC # FLD: 13.6 % — SIGNIFICANT CHANGE UP (ref 11.5–14.5)
WBC # BLD: 20.69 K/UL — HIGH (ref 4.8–10.8)
WBC # FLD AUTO: 20.69 K/UL — HIGH (ref 4.8–10.8)

## 2018-10-19 RX ORDER — SODIUM CHLORIDE 9 MG/ML
1000 INJECTION, SOLUTION INTRAVENOUS
Qty: 0 | Refills: 0 | Status: DISCONTINUED | OUTPATIENT
Start: 2018-10-19 | End: 2018-10-22

## 2018-10-19 RX ORDER — CIPROFLOXACIN LACTATE 400MG/40ML
400 VIAL (ML) INTRAVENOUS EVERY 12 HOURS
Qty: 0 | Refills: 0 | Status: DISCONTINUED | OUTPATIENT
Start: 2018-10-19 | End: 2018-10-23

## 2018-10-19 RX ADMIN — MEMANTINE HYDROCHLORIDE 10 MILLIGRAM(S): 10 TABLET ORAL at 05:35

## 2018-10-19 RX ADMIN — Medication 2 UNIT(S): at 12:46

## 2018-10-19 RX ADMIN — Medication 250 MILLIGRAM(S): at 05:42

## 2018-10-19 RX ADMIN — Medication 250 MILLIGRAM(S): at 14:42

## 2018-10-19 RX ADMIN — Medication 650 MILLIGRAM(S): at 06:43

## 2018-10-19 RX ADMIN — Medication 1: at 12:46

## 2018-10-19 RX ADMIN — Medication 325 MILLIGRAM(S): at 21:56

## 2018-10-19 RX ADMIN — TAMSULOSIN HYDROCHLORIDE 0.4 MILLIGRAM(S): 0.4 CAPSULE ORAL at 21:56

## 2018-10-19 RX ADMIN — Medication 325 MILLIGRAM(S): at 14:42

## 2018-10-19 RX ADMIN — Medication 650 MILLIGRAM(S): at 05:37

## 2018-10-19 RX ADMIN — SODIUM CHLORIDE 50 MILLILITER(S): 9 INJECTION, SOLUTION INTRAVENOUS at 11:00

## 2018-10-19 RX ADMIN — ATORVASTATIN CALCIUM 40 MILLIGRAM(S): 80 TABLET, FILM COATED ORAL at 21:56

## 2018-10-19 RX ADMIN — Medication 250 MILLIGRAM(S): at 21:56

## 2018-10-19 RX ADMIN — DONEPEZIL HYDROCHLORIDE 10 MILLIGRAM(S): 10 TABLET, FILM COATED ORAL at 21:56

## 2018-10-19 RX ADMIN — SODIUM CHLORIDE 75 MILLILITER(S): 9 INJECTION INTRAMUSCULAR; INTRAVENOUS; SUBCUTANEOUS at 05:36

## 2018-10-19 RX ADMIN — Medication 1: at 08:28

## 2018-10-19 RX ADMIN — Medication 250 MILLIGRAM(S): at 05:36

## 2018-10-19 RX ADMIN — Medication 325 MILLIGRAM(S): at 05:36

## 2018-10-19 RX ADMIN — MEMANTINE HYDROCHLORIDE 10 MILLIGRAM(S): 10 TABLET ORAL at 18:01

## 2018-10-19 RX ADMIN — AMLODIPINE BESYLATE 5 MILLIGRAM(S): 2.5 TABLET ORAL at 05:36

## 2018-10-19 RX ADMIN — Medication 200 MILLIGRAM(S): at 18:00

## 2018-10-19 RX ADMIN — Medication 2 UNIT(S): at 08:27

## 2018-10-19 NOTE — PROGRESS NOTE ADULT - SUBJECTIVE AND OBJECTIVE BOX
YAZAN WALKER  83y  Male      Patient is a 83y old  Male who presents with a chief complaint of urinary retention (18 Oct 2018 12:29)    gross hematuria s/p cystoscopy    REVIEW OF SYSTEMS:  CONSTITUTIONAL: No fever, weight loss, or fatigue  EYES: No eye pain, visual disturbances, or discharge  ENMT:  No difficulty hearing, tinnitus, vertigo; No sinus or throat pain  NECK: No pain or stiffness  BREASTS: No pain, masses, or nipple discharge  RESPIRATORY: No cough, wheezing, chills or hemoptysis; No shortness of breath  CARDIOVASCULAR: No chest pain, palpitations, dizziness, or leg swelling  GASTROINTESTINAL: No abdominal or epigastric pain. No nausea, vomiting, or hematemesis; No diarrhea or constipation. No melena or hematochezia.  GENITOURINARY: No dysuria, frequency, hematuria, or incontinence  NEUROLOGICAL: No headaches, memory loss, loss of strength, numbness, or tremors  SKIN: No itching, burning, rashes, or lesions   LYMPH NODES: No enlarged glands  ENDOCRINE: No heat or cold intolerance; No hair loss  MUSCULOSKELETAL: No joint pain or swelling; No muscle, back, or extremity pain  PSYCHIATRIC: No depression, anxiety, mood swings, or difficulty sleeping  HEME/LYMPH: No easy bruising, or bleeding gums  ALLERY AND IMMUNOLOGIC: No hives or eczema  FAMILY HISTORY:  No pertinent family history in first degree relatives    T(C): 37.8 (10-19-18 @ 04:30), Max: 37.8 (10-19-18 @ 04:30)  HR: 86 (10-19-18 @ 04:30) (86 - 99)  BP: 118/59 (10-19-18 @ 04:30) (118/59 - 125/67)  RR: 16 (10-19-18 @ 04:30) (16 - 16)  SpO2: --  Wt(kg): --Vital Signs Last 24 Hrs  T(C): 37.8 (19 Oct 2018 04:30), Max: 37.8 (19 Oct 2018 04:30)  T(F): 100 (19 Oct 2018 04:30), Max: 100 (19 Oct 2018 04:30)  HR: 86 (19 Oct 2018 04:30) (86 - 99)  BP: 118/59 (19 Oct 2018 04:30) (118/59 - 125/67)  BP(mean): --  RR: 16 (19 Oct 2018 04:30) (16 - 16)  SpO2: --  No Known Allergies      PHYSICAL EXAM:  GENERAL: NAD,   HEAD:  Atraumatic, Normocephalic  EYES: EOMI, PERRLA, conjunctiva and sclera clear  ENMT: No tonsillar erythema, exudates, or enlargement; Moist mucous membranes, Good dentition, No lesions  NECK: Supple, No JVD, Normal thyroid  NERVOUS SYSTEM:  Alert & Oriented X3, Good concentration;   CHEST/LUNG: Clear to percussion bilaterally; No rales, rhonchi, wheezing, or rubs  HEART: Regular rate and rhythm; No murmurs, rubs, or gallops  ABDOMEN: Soft, Nontender, Nondistended; Bowel sounds present  EXTREMITIES:  2+ Peripheral Pulses, No clubbing, cyanosis, or edema  LYMPH: No lymphadenopathy noted  SKIN: No rashes or lesions      LABS:  10-18    148<H>  |  113<H>  |  20  ----------------------------<  165<H>  4.2   |  20  |  1.3    Ca    7.5<L>      18 Oct 2018 09:43                              8.0    20.89 )-----------( 279      ( 18 Oct 2018 09:43 )             25.4       RADIOLOGY & ADDITIONAL TESTS:    MEDICATION:  acetaminophen   Tablet .. 650 milliGRAM(s) Oral every 6 hours PRN  amLODIPine   Tablet 5 milliGRAM(s) Oral daily  amoxicillin      Capsule 250 milliGRAM(s) Oral every 8 hours  atorvastatin 40 milliGRAM(s) Oral at bedtime  ciprofloxacin   IVPB 400 milliGRAM(s) IV Intermittent every 12 hours  dextrose 40% Gel 15 Gram(s) Oral once PRN  dextrose 5%. 1000 milliLiter(s) IV Continuous <Continuous>  dextrose 50% Injectable 12.5 Gram(s) IV Push once  dextrose 50% Injectable 25 Gram(s) IV Push once  dextrose 50% Injectable 25 Gram(s) IV Push once  donepezil 10 milliGRAM(s) Oral at bedtime  ferrous    sulfate 325 milliGRAM(s) Oral three times a day  glucagon  Injectable 1 milliGRAM(s) IntraMuscular once PRN  insulin lispro (HumaLOG) corrective regimen sliding scale   SubCutaneous three times a day before meals  insulin lispro Injectable (HumaLOG) 2 Unit(s) SubCutaneous three times a day before meals  memantine 10 milliGRAM(s) Oral two times a day  morphine  - Injectable 2 milliGRAM(s) IV Push every 15 minutes PRN  ondansetron Injectable 4 milliGRAM(s) IV Push once PRN  sodium chloride 0.9%. 1000 milliLiter(s) IV Continuous <Continuous>  tamsulosin 0.4 milliGRAM(s) Oral at bedtime      HEALTH ISSUES - PROBLEM Dx:  Urinary tract infection with hematuria, site unspecified: Urinary tract infection with hematuria, site unspecified,change to iv cipro  Dementia: Dementia  DM (diabetes mellitus): DM (diabetes mellitus)  HTN (hypertension): HTN (hypertension)  Anemia: Anemia acute blood loss s/p prbc  Urinary retention: Urinary retention on de leon  Hematuria: Hematuria    hypernatremia will change to 0.45 ns

## 2018-10-19 NOTE — PROGRESS NOTE ADULT - ASSESSMENT
POD #2 s/p TURP     - Case D/W Dr. Lange:    - will hold CBI this AM as urine is clear - monitor for hematuria   - If urine remains clear, will D/C CBI otherwise will resume.    - Pt will need  de leon until 10/22, then TOV (either inpt or outpt)   - F/U labs today POD #2 s/p TURP     - Case D/W Dr. Lange:    - will hold CBI this AM as urine is clear - monitor for hematuria   - If urine remains clear, will D/C CBI otherwise will resume.    - Pt will need  de leon until 10/22, then TOV (either inpt or outpt)   - Hgb stable   - persistent WBC's - ?source - will repeat UA/urine culture

## 2018-10-19 NOTE — CHART NOTE - NSCHARTNOTEFT_GEN_A_CORE
Pt has been off CBi since 9 AM -- de leon patent but slightly hematuric (maroon, fruit punch).   Dr. Lange aware - recommend to keep de leon to gravity (no CBI).  Will continue to  monitor; Hgb stable since surgery. Continue to follow labs

## 2018-10-19 NOTE — PROGRESS NOTE ADULT - SUBJECTIVE AND OBJECTIVE BOX
S; No significant change in pt. Urine is yellow on CBI  O; Vital Signs Last 24 Hrs  T(C): 37.8 (19 Oct 2018 04:30), Max: 37.8 (19 Oct 2018 04:30)  T(F): 100 (19 Oct 2018 04:30), Max: 100 (19 Oct 2018 04:30)  HR: 86 (19 Oct 2018 04:30) (86 - 99)  BP: 118/59 (19 Oct 2018 04:30) (118/59 - 125/67)  BP(mean): --  RR: 16 (19 Oct 2018 04:30) (16 - 16)    MEDICATIONS  (STANDING):  amLODIPine   Tablet 5 milliGRAM(s) Oral daily  amoxicillin      Capsule 250 milliGRAM(s) Oral every 8 hours  atorvastatin 40 milliGRAM(s) Oral at bedtime  ciprofloxacin   IVPB 400 milliGRAM(s) IV Intermittent every 12 hours  dextrose 5%. 1000 milliLiter(s) (50 mL/Hr) IV Continuous <Continuous>  dextrose 50% Injectable 12.5 Gram(s) IV Push once  dextrose 50% Injectable 25 Gram(s) IV Push once  dextrose 50% Injectable 25 Gram(s) IV Push once  donepezil 10 milliGRAM(s) Oral at bedtime  ferrous    sulfate 325 milliGRAM(s) Oral three times a day  insulin lispro (HumaLOG) corrective regimen sliding scale   SubCutaneous three times a day before meals  insulin lispro Injectable (HumaLOG) 2 Unit(s) SubCutaneous three times a day before meals  memantine 10 milliGRAM(s) Oral two times a day  sodium chloride 0.45%. 1000 milliLiter(s) (50 mL/Hr) IV Continuous <Continuous>  tamsulosin 0.4 milliGRAM(s) Oral at bedtime    MEDICATIONS  (PRN):  acetaminophen   Tablet .. 650 milliGRAM(s) Oral every 6 hours PRN Temp greater or equal to 38C (100.4F), Mild Pain (1 - 3)  dextrose 40% Gel 15 Gram(s) Oral once PRN Blood Glucose LESS THAN 70 milliGRAM(s)/deciliter  glucagon  Injectable 1 milliGRAM(s) IntraMuscular once PRN Glucose LESS THAN 70 milligrams/deciliter  morphine  - Injectable 2 milliGRAM(s) IV Push every 15 minutes PRN Severe Pain (7 - 10)  ondansetron Injectable 4 milliGRAM(s) IV Push once PRN Nausea and/or Vomiting        EXAM:  :  de leon in place, patent, yellow urine in tubing/bag    LABS: PENDING    RECENT CULTURES:  10-13 @ 15:42 .Urine Catheterized Enterococcus faecalis  Morganella morganii Enterococcus faecalis   50,000 - 99,000 CFU/mL Enterococcus faecalis  10,000 - 49,000 CFU/mL Morganella morganii     10-13 @ 11:26 .Blood None     No growth at 5 days. S; No significant change in pt. Urine is yellow on CBI  O; Vital Signs Last 24 Hrs  T(C): 37.8 (19 Oct 2018 04:30), Max: 37.8 (19 Oct 2018 04:30)  T(F): 100 (19 Oct 2018 04:30), Max: 100 (19 Oct 2018 04:30)  HR: 86 (19 Oct 2018 04:30) (86 - 99)  BP: 118/59 (19 Oct 2018 04:30) (118/59 - 125/67)  BP(mean): --  RR: 16 (19 Oct 2018 04:30) (16 - 16)    MEDICATIONS  (STANDING):  amLODIPine   Tablet 5 milliGRAM(s) Oral daily  amoxicillin      Capsule 250 milliGRAM(s) Oral every 8 hours  atorvastatin 40 milliGRAM(s) Oral at bedtime  ciprofloxacin   IVPB 400 milliGRAM(s) IV Intermittent every 12 hours  dextrose 5%. 1000 milliLiter(s) (50 mL/Hr) IV Continuous <Continuous>  dextrose 50% Injectable 12.5 Gram(s) IV Push once  dextrose 50% Injectable 25 Gram(s) IV Push once  dextrose 50% Injectable 25 Gram(s) IV Push once  donepezil 10 milliGRAM(s) Oral at bedtime  ferrous    sulfate 325 milliGRAM(s) Oral three times a day  insulin lispro (HumaLOG) corrective regimen sliding scale   SubCutaneous three times a day before meals  insulin lispro Injectable (HumaLOG) 2 Unit(s) SubCutaneous three times a day before meals  memantine 10 milliGRAM(s) Oral two times a day  sodium chloride 0.45%. 1000 milliLiter(s) (50 mL/Hr) IV Continuous <Continuous>  tamsulosin 0.4 milliGRAM(s) Oral at bedtime    MEDICATIONS  (PRN):  acetaminophen   Tablet .. 650 milliGRAM(s) Oral every 6 hours PRN Temp greater or equal to 38C (100.4F), Mild Pain (1 - 3)  dextrose 40% Gel 15 Gram(s) Oral once PRN Blood Glucose LESS THAN 70 milliGRAM(s)/deciliter  glucagon  Injectable 1 milliGRAM(s) IntraMuscular once PRN Glucose LESS THAN 70 milligrams/deciliter  morphine  - Injectable 2 milliGRAM(s) IV Push every 15 minutes PRN Severe Pain (7 - 10)  ondansetron Injectable 4 milliGRAM(s) IV Push once PRN Nausea and/or Vomiting        EXAM:  :  de leon in place, patent, yellow urine in tubing/bag    LABS:                         8.6    20.69 )-----------( 290      ( 19 Oct 2018 12:03 )             27.4     Hemoglobin: 8.6 g/dL (10-19 @ 12:03)  Hemoglobin: 8.0 g/dL (10-18 @ 09:43)  Hemoglobin: 8.8 g/dL (10-17 @ 08:44)  Hemoglobin: 10.3 g/dL (10-16 @ 08:02)  Hemoglobin: 10.5 g/dL (10-15 @ 11:32)  Hemoglobin: 10.5 g/dL (10-15 @ 07:57)  Hemoglobin: 8.8 g/dL (10-14 @ 18:49)      10-18    148<H>  |  113<H>  |  20  ----------------------------<  165<H>  4.2   |  20  |  1.3    Ca    7.5<L>      18 Oct 2018 09:43        RECENT CULTURES:  10-13 @ 15:42 .Urine Catheterized Enterococcus faecalis  Morganella morganii Enterococcus faecalis   50,000 - 99,000 CFU/mL Enterococcus faecalis  10,000 - 49,000 CFU/mL Morganella morganii     10-13 @ 11:26 .Blood None     No growth at 5 days.

## 2018-10-20 LAB
ALLERGY+IMMUNOLOGY DIAG STUDY NOTE: SIGNIFICANT CHANGE UP
ANION GAP SERPL CALC-SCNC: 18 MMOL/L — HIGH (ref 7–14)
APPEARANCE UR: CLEAR — SIGNIFICANT CHANGE UP
BACTERIA # UR AUTO: ABNORMAL
BASOPHILS # BLD AUTO: 0.01 K/UL — SIGNIFICANT CHANGE UP (ref 0–0.2)
BASOPHILS NFR BLD AUTO: 0.1 % — SIGNIFICANT CHANGE UP (ref 0–1)
BILIRUB UR-MCNC: NEGATIVE — SIGNIFICANT CHANGE UP
BUN SERPL-MCNC: 17 MG/DL — SIGNIFICANT CHANGE UP (ref 10–20)
CALCIUM SERPL-MCNC: 8.2 MG/DL — LOW (ref 8.5–10.1)
CHLORIDE SERPL-SCNC: 106 MMOL/L — SIGNIFICANT CHANGE UP (ref 98–110)
CO2 SERPL-SCNC: 18 MMOL/L — SIGNIFICANT CHANGE UP (ref 17–32)
COLOR SPEC: YELLOW — SIGNIFICANT CHANGE UP
COMMENT - URINE: SIGNIFICANT CHANGE UP
CREAT SERPL-MCNC: 1.2 MG/DL — SIGNIFICANT CHANGE UP (ref 0.7–1.5)
DIFF PNL FLD: ABNORMAL
EOSINOPHIL # BLD AUTO: 0.09 K/UL — SIGNIFICANT CHANGE UP (ref 0–0.7)
EOSINOPHIL NFR BLD AUTO: 0.5 % — SIGNIFICANT CHANGE UP (ref 0–8)
EPI CELLS # UR: ABNORMAL /HPF
GLUCOSE BLDC GLUCOMTR-MCNC: 160 MG/DL — HIGH (ref 70–99)
GLUCOSE BLDC GLUCOMTR-MCNC: 167 MG/DL — HIGH (ref 70–99)
GLUCOSE BLDC GLUCOMTR-MCNC: 171 MG/DL — HIGH (ref 70–99)
GLUCOSE BLDC GLUCOMTR-MCNC: 181 MG/DL — HIGH (ref 70–99)
GLUCOSE SERPL-MCNC: 168 MG/DL — HIGH (ref 70–99)
GLUCOSE UR QL: NEGATIVE MG/DL — SIGNIFICANT CHANGE UP
HCT VFR BLD CALC: 23.4 % — LOW (ref 42–52)
HCT VFR BLD CALC: 26.2 % — LOW (ref 42–52)
HGB BLD-MCNC: 7.4 G/DL — CRITICAL LOW (ref 14–18)
HGB BLD-MCNC: 8.6 G/DL — LOW (ref 14–18)
IMM GRANULOCYTES NFR BLD AUTO: 1 % — HIGH (ref 0.1–0.3)
KETONES UR-MCNC: NEGATIVE — SIGNIFICANT CHANGE UP
LEUKOCYTE ESTERASE UR-ACNC: ABNORMAL
LYMPHOCYTES # BLD AUTO: 1.61 K/UL — SIGNIFICANT CHANGE UP (ref 1.2–3.4)
LYMPHOCYTES # BLD AUTO: 9.3 % — LOW (ref 20.5–51.1)
MCHC RBC-ENTMCNC: 29.4 PG — SIGNIFICANT CHANGE UP (ref 27–31)
MCHC RBC-ENTMCNC: 29.5 PG — SIGNIFICANT CHANGE UP (ref 27–31)
MCHC RBC-ENTMCNC: 31.6 G/DL — LOW (ref 32–37)
MCHC RBC-ENTMCNC: 32.8 G/DL — SIGNIFICANT CHANGE UP (ref 32–37)
MCV RBC AUTO: 89.7 FL — SIGNIFICANT CHANGE UP (ref 80–94)
MCV RBC AUTO: 92.9 FL — SIGNIFICANT CHANGE UP (ref 80–94)
MONOCYTES # BLD AUTO: 0.91 K/UL — HIGH (ref 0.1–0.6)
MONOCYTES NFR BLD AUTO: 5.3 % — SIGNIFICANT CHANGE UP (ref 1.7–9.3)
NEUTROPHILS # BLD AUTO: 14.45 K/UL — HIGH (ref 1.4–6.5)
NEUTROPHILS NFR BLD AUTO: 83.8 % — HIGH (ref 42.2–75.2)
NITRITE UR-MCNC: NEGATIVE — SIGNIFICANT CHANGE UP
NRBC # BLD: 0 /100 WBCS — SIGNIFICANT CHANGE UP (ref 0–0)
NRBC # BLD: 0 /100 WBCS — SIGNIFICANT CHANGE UP (ref 0–0)
PH UR: 5.5 — SIGNIFICANT CHANGE UP (ref 5–8)
PLATELET # BLD AUTO: 297 K/UL — SIGNIFICANT CHANGE UP (ref 130–400)
PLATELET # BLD AUTO: 321 K/UL — SIGNIFICANT CHANGE UP (ref 130–400)
POTASSIUM SERPL-MCNC: 3.9 MMOL/L — SIGNIFICANT CHANGE UP (ref 3.5–5)
POTASSIUM SERPL-SCNC: 3.9 MMOL/L — SIGNIFICANT CHANGE UP (ref 3.5–5)
PROT UR-MCNC: >=300 MG/DL
RBC # BLD: 2.52 M/UL — LOW (ref 4.7–6.1)
RBC # BLD: 2.92 M/UL — LOW (ref 4.7–6.1)
RBC # FLD: 13.2 % — SIGNIFICANT CHANGE UP (ref 11.5–14.5)
RBC # FLD: 13.7 % — SIGNIFICANT CHANGE UP (ref 11.5–14.5)
RBC CASTS # UR COMP ASSIST: ABNORMAL /HPF
SODIUM SERPL-SCNC: 142 MMOL/L — SIGNIFICANT CHANGE UP (ref 135–146)
SP GR SPEC: >=1.03 (ref 1.01–1.03)
TYPE + AB SCN PNL BLD: SIGNIFICANT CHANGE UP
UROBILINOGEN FLD QL: 0.2 MG/DL — SIGNIFICANT CHANGE UP (ref 0.2–0.2)
WBC # BLD: 17.24 K/UL — HIGH (ref 4.8–10.8)
WBC # BLD: 20.28 K/UL — HIGH (ref 4.8–10.8)
WBC # FLD AUTO: 17.24 K/UL — HIGH (ref 4.8–10.8)
WBC # FLD AUTO: 20.28 K/UL — HIGH (ref 4.8–10.8)
WBC UR QL: SIGNIFICANT CHANGE UP /HPF

## 2018-10-20 RX ADMIN — Medication 2 UNIT(S): at 17:21

## 2018-10-20 RX ADMIN — Medication 250 MILLIGRAM(S): at 05:27

## 2018-10-20 RX ADMIN — Medication 325 MILLIGRAM(S): at 13:34

## 2018-10-20 RX ADMIN — AMLODIPINE BESYLATE 5 MILLIGRAM(S): 2.5 TABLET ORAL at 05:27

## 2018-10-20 RX ADMIN — Medication 325 MILLIGRAM(S): at 05:27

## 2018-10-20 RX ADMIN — Medication 250 MILLIGRAM(S): at 13:34

## 2018-10-20 RX ADMIN — SODIUM CHLORIDE 50 MILLILITER(S): 9 INJECTION, SOLUTION INTRAVENOUS at 10:47

## 2018-10-20 RX ADMIN — Medication 200 MILLIGRAM(S): at 05:26

## 2018-10-20 RX ADMIN — Medication 1: at 08:55

## 2018-10-20 RX ADMIN — DONEPEZIL HYDROCHLORIDE 10 MILLIGRAM(S): 10 TABLET, FILM COATED ORAL at 22:06

## 2018-10-20 RX ADMIN — TAMSULOSIN HYDROCHLORIDE 0.4 MILLIGRAM(S): 0.4 CAPSULE ORAL at 22:06

## 2018-10-20 RX ADMIN — MEMANTINE HYDROCHLORIDE 10 MILLIGRAM(S): 10 TABLET ORAL at 17:26

## 2018-10-20 RX ADMIN — Medication 325 MILLIGRAM(S): at 22:06

## 2018-10-20 RX ADMIN — Medication 2 UNIT(S): at 12:42

## 2018-10-20 RX ADMIN — Medication 1: at 13:00

## 2018-10-20 RX ADMIN — Medication 2 UNIT(S): at 08:55

## 2018-10-20 RX ADMIN — MEMANTINE HYDROCHLORIDE 10 MILLIGRAM(S): 10 TABLET ORAL at 05:27

## 2018-10-20 RX ADMIN — ATORVASTATIN CALCIUM 40 MILLIGRAM(S): 80 TABLET, FILM COATED ORAL at 22:06

## 2018-10-20 RX ADMIN — Medication 1: at 17:21

## 2018-10-20 RX ADMIN — Medication 200 MILLIGRAM(S): at 20:01

## 2018-10-20 RX ADMIN — Medication 250 MILLIGRAM(S): at 22:06

## 2018-10-20 NOTE — CHART NOTE - NSCHARTNOTEFT_GEN_A_CORE
Dr. Lange wanted it to be noted that patient has 60 cc in his 3 way Oates balloon, so whoever removes Oates catheter would need to take all 60 cc out prior to removing catheter Sunday at midnight for TOV.   Will relay info to RN caring for patient so she can sign out to next shift.

## 2018-10-20 NOTE — PROGRESS NOTE ADULT - PROBLEM SELECTOR PLAN 1
* s/p TURP 10/17/18    - Hold CBI and continue Oates catheter until 10/22/18, then TOV.  - Monitor H/H & transfuse PRN.  - Ivabx -- Cipro.    - Monitor WBC -- will repeat for AM.  - F/U repeat U/A and C&S.

## 2018-10-20 NOTE — PROGRESS NOTE ADULT - SUBJECTIVE AND OBJECTIVE BOX
Patient seen & examined this am.  Patient resting comfortable in NAD.  Oates catheter in place functioning well with clear/yellow urine in bag.   Patient denies c/o pain or urinary symptoms.  No fever, chills, tremors or N/V.         I&O's Detail    19 Oct 2018 07:01  -  20 Oct 2018 07:00  --------------------------------------------------------  IN:    IV PiggyBack: 200 mL    sodium chloride 0.45%.: 900 mL  Total IN: 1100 mL    OUT:    Indwelling Catheter - Urethral: 1250 mL  Total OUT: 1250 mL    Total NET: -150 mL        Vital Signs Last 24 Hrs  T(C): 36 (20 Oct 2018 05:00), Max: 37.1 (19 Oct 2018 14:00)  T(F): 96.8 (20 Oct 2018 05:00), Max: 98.7 (19 Oct 2018 14:00)  HR: 83 (20 Oct 2018 05:00) (83 - 89)  BP: 114/55 (20 Oct 2018 05:00) (114/55 - 148/62)  BP(mean): --  RR: 16 (20 Oct 2018 05:00) (16 - 16)  SpO2: 97% (20 Oct 2018 00:20) (97% - 97%)        PHYSICAL EXAM:    General: Conversant in NAD.    Back:  no CVAT or  spinal tenderness.     Gastrointestinal: + BS, soft, no distention, non-tender, no rebound or guarding or peritoneal signs.    :  Oates in place, functioning well, clear yellow urine, no hematuria or pyuria.  No SP tenderness.           Labs:                                           7.4    20.6 --> 20.28 )-----------( 297      ( 20 Oct 2018 06:56 )                             23.4

## 2018-10-20 NOTE — PROGRESS NOTE ADULT - SUBJECTIVE AND OBJECTIVE BOX
Chart reviewed, patient examined. Pertinent results reviewed.  specialist f/u reviewed  HD#10; POD#3-TURP    YAZAN WALKER  83y  Male      Patient is a 83y old  Male who presents with a chief complaint of urinary retention (18 Oct 2018 12:29); he had severe gross hematuria for which he required 5 units of PRBCs, & then had a TURP By  on 10/17; The hematuria is min now.    gross hematuria s/p cystoscopy    REVIEW OF SYSTEMS:  CONSTITUTIONAL: No fever, weight loss, or fatigue  EYES: ongoing visual problems  ENMT:  No difficulty hearing, tinnitus, vertigo; No sinus or throat pain  NECK: No pain or stiffness  BREASTS: No pain, masses, or nipple discharge  RESPIRATORY: No cough, wheezing, chills or hemoptysis; No shortness of breath  CARDIOVASCULAR: No chest pain, palpitations, dizziness, or leg swelling  GASTROINTESTINAL: No abdominal or epigastric pain. No nausea, vomiting, or hematemesis; No diarrhea or constipation. No melena or hematochezia.  GENITOURINARY: + hematuria-now improved-with de leon cath  NEUROLOGICAL: No headaches,stable memory loss, stable loss of strength  SKIN: No itching, burning, rashes, or lesions   LYMPH NODES: No enlarged glands  ENDOCRINE: No heat or cold intolerance; No hair loss  MUSCULOSKELETAL: No joint pain or swelling; No muscle, back, or extremity pain  PSYCHIATRIC: No depression, anxiety, mood swings, or difficulty sleeping  HEME/LYMPH: No easy bruising, or bleeding gums  ALLERY AND IMMUNOLOGIC: No hives or eczema  FAMILY HISTORY:  No pertinent family history in first degree relatives  Vital Signs Last 24 Hrs  T(C): 36 (20 Oct 2018 05:00), Max: 36.7 (19 Oct 2018 22:00)  T(F): 96.8 (20 Oct 2018 05:00), Max: 98.1 (19 Oct 2018 22:00)  HR: 83 (20 Oct 2018 05:00) (83 - 83)  BP: 114/55 (20 Oct 2018 05:00) (114/55 - 123/58)  BP(mean): --  RR: 22 (20 Oct 2018 12:43) (16 - 22)  SpO2: 96% (20 Oct 2018 12:43) (96% - 97%)No Known Allergies      PHYSICAL EXAM: Wife at bedside  GENERAL: NAD, older man, Ox2 offers me his hand to shake  HEAD:  Atraumatic, Normocephalic  EYES: EOMI, PERRLA, conjunctiva and sclera clear; + arcus   ENMT: No tonsillar erythema, exudates, or enlargement; Moist mucous membranes, Good dentition, No lesions  NECK: Supple, No JVD, Normal thyroid  NERVOUS SYSTEM:  Alert & Oriented X3, Good concentration;   CHEST/LUNG: Clear to percussion bilaterally; No rales, rhonchi, wheezing, or rubs  HEART: Regular rate and rhythm; No murmurs, rubs, or gallops  ABDOMEN: Soft, Nontender, Nondistended; Bowel sounds present  -de leon cath with richard colored urine  EXTREMITIES:  2+ Peripheral Pulses, No clubbing, cyanosis, or edema  LYMPH: No lymphadenopathy noted  SKIN: No rashes or lesions      LABS:                        7.4    20.28 )-----------( 297      ( 20 Oct 2018 06:56 )             23.4     10-18    148<H>  |  113<H>  |  20  ----------------------------<  165<H>  4.2   |  20  |  1.3    Ca    7.5<L>      18 Oct 2018 09:43                              8.0    20.89 )-----------( 279      ( 18 Oct 2018 09:43 )             25.4       RADIOLOGY & ADDITIONAL TESTS:    MEDICATION:  acetaminophen   Tablet .. 650 milliGRAM(s) Oral every 6 hours PRN  amLODIPine   Tablet 5 milliGRAM(s) Oral daily  amoxicillin      Capsule 250 milliGRAM(s) Oral every 8 hours  atorvastatin 40 milliGRAM(s) Oral at bedtime  ciprofloxacin   IVPB 400 milliGRAM(s) IV Intermittent every 12 hours  dextrose 40% Gel 15 Gram(s) Oral once PRN  dextrose 5%. 1000 milliLiter(s) IV Continuous <Continuous>  dextrose 50% Injectable 12.5 Gram(s) IV Push once  dextrose 50% Injectable 25 Gram(s) IV Push once  dextrose 50% Injectable 25 Gram(s) IV Push once  donepezil 10 milliGRAM(s) Oral at bedtime  ferrous    sulfate 325 milliGRAM(s) Oral three times a day  glucagon  Injectable 1 milliGRAM(s) IntraMuscular once PRN  insulin lispro (HumaLOG) corrective regimen sliding scale   SubCutaneous three times a day before meals  insulin lispro Injectable (HumaLOG) 2 Unit(s) SubCutaneous three times a day before meals  memantine 10 milliGRAM(s) Oral two times a day  morphine  - Injectable 2 milliGRAM(s) IV Push every 15 minutes PRN  ondansetron Injectable 4 milliGRAM(s) IV Push once PRN  sodium chloride 0.9%. 1000 milliLiter(s) IV Continuous <Continuous>  tamsulosin 0.4 milliGRAM(s) Oral at bedtime      HEALTH ISSUES - PROBLEM Dx:  Urinary tract infection with hematuria, site unspecified: Urinary tract infection with hematuria, site unspecified,change to iv ampicillin  Dementia: Dementia  DM (diabetes mellitus): DM (diabetes mellitus)  HTN (hypertension): HTN (hypertension)  Anemia: Anemia acute blood loss s/p prbc; will give 1 unit today; check CBC tonite & in AM  Urinary retention: Urinary retention on de leon-see  f/u  Hematuria: Hematuria    hypernatremia will change to 0.45 ns

## 2018-10-21 LAB
ANION GAP SERPL CALC-SCNC: 15 MMOL/L — HIGH (ref 7–14)
APPEARANCE UR: CLEAR — SIGNIFICANT CHANGE UP
BACTERIA # UR AUTO: ABNORMAL
BASOPHILS # BLD AUTO: 0.04 K/UL — SIGNIFICANT CHANGE UP (ref 0–0.2)
BASOPHILS NFR BLD AUTO: 0.2 % — SIGNIFICANT CHANGE UP (ref 0–1)
BILIRUB UR-MCNC: NEGATIVE — SIGNIFICANT CHANGE UP
BUN SERPL-MCNC: 11 MG/DL — SIGNIFICANT CHANGE UP (ref 10–20)
CALCIUM SERPL-MCNC: 8.2 MG/DL — LOW (ref 8.5–10.1)
CHLORIDE SERPL-SCNC: 105 MMOL/L — SIGNIFICANT CHANGE UP (ref 98–110)
CO2 SERPL-SCNC: 22 MMOL/L — SIGNIFICANT CHANGE UP (ref 17–32)
COLOR SPEC: YELLOW — SIGNIFICANT CHANGE UP
CREAT SERPL-MCNC: 1 MG/DL — SIGNIFICANT CHANGE UP (ref 0.7–1.5)
CULTURE RESULTS: NO GROWTH — SIGNIFICANT CHANGE UP
DIFF PNL FLD: ABNORMAL
EOSINOPHIL # BLD AUTO: 0.09 K/UL — SIGNIFICANT CHANGE UP (ref 0–0.7)
EOSINOPHIL NFR BLD AUTO: 0.5 % — SIGNIFICANT CHANGE UP (ref 0–8)
EPI CELLS # UR: ABNORMAL /HPF
GLUCOSE BLDC GLUCOMTR-MCNC: 147 MG/DL — HIGH (ref 70–99)
GLUCOSE BLDC GLUCOMTR-MCNC: 152 MG/DL — HIGH (ref 70–99)
GLUCOSE BLDC GLUCOMTR-MCNC: 182 MG/DL — HIGH (ref 70–99)
GLUCOSE BLDC GLUCOMTR-MCNC: 191 MG/DL — HIGH (ref 70–99)
GLUCOSE SERPL-MCNC: 172 MG/DL — HIGH (ref 70–99)
GLUCOSE UR QL: NEGATIVE MG/DL — SIGNIFICANT CHANGE UP
HCT VFR BLD CALC: 29.2 % — LOW (ref 42–52)
HCT VFR BLD CALC: 33.1 % — LOW (ref 42–52)
HGB BLD-MCNC: 10.4 G/DL — LOW (ref 14–18)
HGB BLD-MCNC: 9.5 G/DL — LOW (ref 14–18)
IMM GRANULOCYTES NFR BLD AUTO: 0.8 % — HIGH (ref 0.1–0.3)
KETONES UR-MCNC: NEGATIVE — SIGNIFICANT CHANGE UP
LEUKOCYTE ESTERASE UR-ACNC: ABNORMAL
LYMPHOCYTES # BLD AUTO: 1.65 K/UL — SIGNIFICANT CHANGE UP (ref 1.2–3.4)
LYMPHOCYTES # BLD AUTO: 9.4 % — LOW (ref 20.5–51.1)
MCHC RBC-ENTMCNC: 28.5 PG — SIGNIFICANT CHANGE UP (ref 27–31)
MCHC RBC-ENTMCNC: 29.4 PG — SIGNIFICANT CHANGE UP (ref 27–31)
MCHC RBC-ENTMCNC: 31.4 G/DL — LOW (ref 32–37)
MCHC RBC-ENTMCNC: 32.5 G/DL — SIGNIFICANT CHANGE UP (ref 32–37)
MCV RBC AUTO: 90.4 FL — SIGNIFICANT CHANGE UP (ref 80–94)
MCV RBC AUTO: 90.7 FL — SIGNIFICANT CHANGE UP (ref 80–94)
MONOCYTES # BLD AUTO: 1.02 K/UL — HIGH (ref 0.1–0.6)
MONOCYTES NFR BLD AUTO: 5.8 % — SIGNIFICANT CHANGE UP (ref 1.7–9.3)
NEUTROPHILS # BLD AUTO: 14.66 K/UL — HIGH (ref 1.4–6.5)
NEUTROPHILS NFR BLD AUTO: 83.3 % — HIGH (ref 42.2–75.2)
NITRITE UR-MCNC: NEGATIVE — SIGNIFICANT CHANGE UP
NRBC # BLD: 0 /100 WBCS — SIGNIFICANT CHANGE UP (ref 0–0)
NRBC # BLD: 0 /100 WBCS — SIGNIFICANT CHANGE UP (ref 0–0)
PH UR: 6 — SIGNIFICANT CHANGE UP (ref 5–8)
PLATELET # BLD AUTO: 213 K/UL — SIGNIFICANT CHANGE UP (ref 130–400)
PLATELET # BLD AUTO: 302 K/UL — SIGNIFICANT CHANGE UP (ref 130–400)
POTASSIUM SERPL-MCNC: 4.7 MMOL/L — SIGNIFICANT CHANGE UP (ref 3.5–5)
POTASSIUM SERPL-SCNC: 4.7 MMOL/L — SIGNIFICANT CHANGE UP (ref 3.5–5)
PROT UR-MCNC: 100 MG/DL
RBC # BLD: 3.23 M/UL — LOW (ref 4.7–6.1)
RBC # BLD: 3.65 M/UL — LOW (ref 4.7–6.1)
RBC # FLD: 13.5 % — SIGNIFICANT CHANGE UP (ref 11.5–14.5)
RBC # FLD: 13.6 % — SIGNIFICANT CHANGE UP (ref 11.5–14.5)
RBC CASTS # UR COMP ASSIST: ABNORMAL /HPF
SODIUM SERPL-SCNC: 142 MMOL/L — SIGNIFICANT CHANGE UP (ref 135–146)
SP GR SPEC: 1.02 — SIGNIFICANT CHANGE UP (ref 1.01–1.03)
SPECIMEN SOURCE: SIGNIFICANT CHANGE UP
UROBILINOGEN FLD QL: 0.2 MG/DL — SIGNIFICANT CHANGE UP (ref 0.2–0.2)
WBC # BLD: 14.75 K/UL — HIGH (ref 4.8–10.8)
WBC # BLD: 17.6 K/UL — HIGH (ref 4.8–10.8)
WBC # FLD AUTO: 14.75 K/UL — HIGH (ref 4.8–10.8)
WBC # FLD AUTO: 17.6 K/UL — HIGH (ref 4.8–10.8)
WBC UR QL: SIGNIFICANT CHANGE UP /HPF

## 2018-10-21 RX ADMIN — MEMANTINE HYDROCHLORIDE 10 MILLIGRAM(S): 10 TABLET ORAL at 17:28

## 2018-10-21 RX ADMIN — Medication 1: at 08:09

## 2018-10-21 RX ADMIN — Medication 250 MILLIGRAM(S): at 21:34

## 2018-10-21 RX ADMIN — Medication 250 MILLIGRAM(S): at 14:35

## 2018-10-21 RX ADMIN — DONEPEZIL HYDROCHLORIDE 10 MILLIGRAM(S): 10 TABLET, FILM COATED ORAL at 21:33

## 2018-10-21 RX ADMIN — Medication 2 UNIT(S): at 08:07

## 2018-10-21 RX ADMIN — AMLODIPINE BESYLATE 5 MILLIGRAM(S): 2.5 TABLET ORAL at 05:55

## 2018-10-21 RX ADMIN — MEMANTINE HYDROCHLORIDE 10 MILLIGRAM(S): 10 TABLET ORAL at 05:55

## 2018-10-21 RX ADMIN — TAMSULOSIN HYDROCHLORIDE 0.4 MILLIGRAM(S): 0.4 CAPSULE ORAL at 21:33

## 2018-10-21 RX ADMIN — Medication 2 UNIT(S): at 12:13

## 2018-10-21 RX ADMIN — Medication 325 MILLIGRAM(S): at 21:33

## 2018-10-21 RX ADMIN — SODIUM CHLORIDE 50 MILLILITER(S): 9 INJECTION, SOLUTION INTRAVENOUS at 08:11

## 2018-10-21 RX ADMIN — Medication 325 MILLIGRAM(S): at 14:35

## 2018-10-21 RX ADMIN — Medication 2 UNIT(S): at 17:29

## 2018-10-21 RX ADMIN — Medication 200 MILLIGRAM(S): at 17:28

## 2018-10-21 RX ADMIN — Medication 250 MILLIGRAM(S): at 05:55

## 2018-10-21 RX ADMIN — Medication 200 MILLIGRAM(S): at 05:55

## 2018-10-21 RX ADMIN — ATORVASTATIN CALCIUM 40 MILLIGRAM(S): 80 TABLET, FILM COATED ORAL at 21:33

## 2018-10-21 RX ADMIN — Medication 1: at 12:13

## 2018-10-21 RX ADMIN — Medication 325 MILLIGRAM(S): at 05:55

## 2018-10-21 NOTE — PROGRESS NOTE ADULT - SUBJECTIVE AND OBJECTIVE BOX
Chart reviewed, patient examined. Pertinent results reviewed.  specialist f/u reviewed  HD#11; POD#4-TURP;     YAZAN WALKER  83y  Male      Patient is a 83y old  Male who presents with a chief complaint of urinary retention (18 Oct 2018 12:29); he had severe gross hematuria for which he required 5 units of PRBCs, & then had a TURP By  on 10/17; The hematuria has now cleared since yesterday. Renal function has improved. He received his 6th unit PRBC yesterday.      REVIEW OF SYSTEMS:  CONSTITUTIONAL: No fever, weight loss, or fatigue  EYES: ongoing visual problems  ENMT:  No difficulty hearing, tinnitus, vertigo; No sinus or throat pain  NECK: No pain or stiffness  RESPIRATORY: No cough, wheezing, chills or hemoptysis; No shortness of breath  CARDIOVASCULAR: No chest pain, palpitations, dizziness, or leg swelling  GASTROINTESTINAL: No abdominal or epigastric pain. No nausea, vomiting, or hematemesis; No diarrhea or constipation. No melena or hematochezia. Pt eating OK  GENITOURINARY: + hematuria-now improved-with de leon cath-HPI  NEUROLOGICAL: No headaches,stable memory loss, stable loss of strength  SKIN: No itching, burning, rashes, or lesions   LYMPH NODES: No enlarged glands  ENDOCRINE: No heat or cold intolerance; No hair loss  MUSCULOSKELETAL: No joint pain or swelling; No muscle, back, or extremity pain  PSYCHIATRIC: No depression, anxiety, mood swings, or difficulty sleeping  HEME/LYMPH: No easy bruising, or bleeding gums  ALLERY AND IMMUNOLOGIC: No hives or eczema  FH:  No pertinent family history in first degree relatives    Vital Signs Last 24 Hrs  T(C): 36.1 (21 Oct 2018 05:41), Max: 37 (20 Oct 2018 21:39)  T(F): 96.9 (21 Oct 2018 05:41), Max: 98.6 (20 Oct 2018 21:39)  HR: 79 (21 Oct 2018 05:41) (79 - 87)  BP: 136/63 (21 Oct 2018 05:41) (111/67 - 136/63)  BP(mean): --  RR: 16 (21 Oct 2018 05:41) (16 - 22)  SpO2: 96% (20 Oct 2018 12:43) (96% - 96%)    PHYSICAL EXAM:   GENERAL: NAD, older man, Ox2 offers me his hand to shake  HEAD:  Atraumatic, Normocephalic  EYES: EOMI, PERRLA, conjunctiva and sclera clear; + arcus   ENMT: No tonsillar erythema, exudates, or enlargement; Moist mucous membranes, Good dentition, No lesions  NECK: Supple, No JVD, Normal thyroid  NERVOUS SYSTEM:  Alert & Oriented X3, Good concentration;   CHEST/LUNG: Clear to percussion bilaterally; No rales, rhonchi, wheezing, or rubs  HEART: Regular rate and rhythm; No murmurs, rubs, or gallops  ABDOMEN: Soft, Nontender, Nondistended; Bowel sounds present  -de leon cath with clear urine   EXTREMITIES:  2+ Peripheral Pulses, No clubbing, cyanosis, or edema  LYMPH: No lymphadenopathy noted  SKIN: No rashes or lesions      LABS:                           9.5    14.75 )-----------( 302      ( 21 Oct 2018 06:49 )- s/p 6th unit             29.2                      7.4    20.28 )-----------( 297      ( 20 Oct 2018 06:56 )             23.4     10-18    148<H>  |  113<H>  |  20  ----------------------------<  165<H>  4.2   |  20  |  1.3    Ca    7.5<L>      18 Oct 2018 09:43                              8.0    20.89 )-----------( 279      ( 18 Oct 2018 09:43 )             25.4       RADIOLOGY & ADDITIONAL TESTS:    MEDICATION:  acetaminophen   Tablet .. 650 milliGRAM(s) Oral every 6 hours PRN  amLODIPine   Tablet 5 milliGRAM(s) Oral daily  amoxicillin      Capsule 250 milliGRAM(s) Oral every 8 hours  atorvastatin 40 milliGRAM(s) Oral at bedtime  ciprofloxacin   IVPB 400 milliGRAM(s) IV Intermittent every 12 hours  dextrose 40% Gel 15 Gram(s) Oral once PRN  dextrose 5%. 1000 milliLiter(s) IV Continuous <Continuous>  dextrose 50% Injectable 12.5 Gram(s) IV Push once  dextrose 50% Injectable 25 Gram(s) IV Push once  dextrose 50% Injectable 25 Gram(s) IV Push once  donepezil 10 milliGRAM(s) Oral at bedtime  ferrous    sulfate 325 milliGRAM(s) Oral three times a day  glucagon  Injectable 1 milliGRAM(s) IntraMuscular once PRN  insulin lispro (HumaLOG) corrective regimen sliding scale   SubCutaneous three times a day before meals  insulin lispro Injectable (HumaLOG) 2 Unit(s) SubCutaneous three times a day before meals  memantine 10 milliGRAM(s) Oral two times a day  morphine  - Injectable 2 milliGRAM(s) IV Push every 15 minutes PRN  ondansetron Injectable 4 milliGRAM(s) IV Push once PRN  sodium chloride 0.9%. 1000 milliLiter(s) IV Continuous <Continuous>  tamsulosin 0.4 milliGRAM(s) Oral at bedtime      HEALTH ISSUES - PROBLEM Dx:  Urinary tract infection with hematuria, site unspecified: Urinary tract infection with hematuria, site unspecified,change to iv ampicillin     -repeat UA, & UC ordered as per   Dementia: Dementia  DM (diabetes mellitus): DM (diabetes mellitus)-BS OK  HTN (hypertension): HTN (hypertension)  Anemia: Anemia acute blood loss s/p prbc; will give 1 unit today; check CBC tonite & in AM  Urinary retention: Urinary retention on de leon-see  f/u  NISHANT-much improved  Hypernatremia- has been corrected  Hematuria: Hematuria    DC Plan-once  clears, then aim for DC back to [facility]

## 2018-10-21 NOTE — PROGRESS NOTE ADULT - PROBLEM SELECTOR PLAN 1
* s/p TURP 10/17/18    - Continue to hold CBI and continue Oates catheter today and then discontinue Oates at midnight then TOV as per Dr. Lange' plan.   - Monitor H/H & transfuse PRN.  - Ivabx -- Cipro.    - Monitor WBC count -- (Improved from 20.2 to 14.7 today)  Will repeat for AM.   - F/U repeat U/A and C&S. (no results as of today).   - Case d/w Dr. Lange yesterday as to his plan & also d/w Dr. Ramirez today (covering for Dr. Lange). * s/p TURP 10/17/18    - Continue to hold CBI and continue Oates catheter today and then discontinue Oates at midnight then TOV as per Dr. Lange' plan.  (Oates has 60 cc of fluid in balloon that must be removed prior to removal of catheter--RN made aware).   - Monitor H/H & transfuse PRN.  - Ivabx -- Cipro.    - Monitor WBC count -- (Improved from 20.2 to 14.7 today)  Will repeat for AM.   - F/U repeat U/A and C&S. (no results as of today).   - Case d/w Dr. Lange yesterday as to his plan & also d/w Dr. Ramirez today (covering for Dr. Lange).

## 2018-10-21 NOTE — PROGRESS NOTE ADULT - SUBJECTIVE AND OBJECTIVE BOX
Patient seen & examined this am.  No acute events noted overnight.   Patient resting comfortable in NAD.  Oates catheter in place functioning well with clear/yellow urine in bag.   Patient continues to deny c/o pain or urinary symptoms.  No fever, chills, tremors or N/V.         I&O's Detail    20 Oct 2018 07:01  -  21 Oct 2018 07:00  --------------------------------------------------------  IN:  Total IN: 0 mL    OUT:    Indwelling Catheter - Urethral: 2400 mL  Total OUT: 2400 mL    Total NET: -2400 mL          Vital Signs Last 24 Hrs  T(C): 36.1 (21 Oct 2018 05:41), Max: 37 (20 Oct 2018 21:39)  T(F): 96.9 (21 Oct 2018 05:41), Max: 98.6 (20 Oct 2018 21:39)  HR: 79 (21 Oct 2018 05:41) (79 - 87)  BP: 136/63 (21 Oct 2018 05:41) (111/67 - 136/63)  BP(mean): --  RR: 16 (21 Oct 2018 05:41) (16 - 22)  SpO2: 96% (20 Oct 2018 12:43) (96% - 96%)        PHYSICAL EXAM:    General: Conversant in NAD.    Back:  no CVAT or  spinal tenderness.     Gastrointestinal: + BS, soft, no distention, non-tender, no rebound or guarding or peritoneal signs.    :  Oates in place, functioning well, clear yellow urine, no hematuria or pyuria.  No SP tenderness.           Labs:                                              9.5 <--(7.4)  (20.28) ---> 14.75 )--------------------( 302      ( 21 Oct 2018 06:49 )                                29.2 <--(23.4)

## 2018-10-22 LAB
CULTURE RESULTS: NO GROWTH — SIGNIFICANT CHANGE UP
GLUCOSE BLDC GLUCOMTR-MCNC: 170 MG/DL — HIGH (ref 70–99)
GLUCOSE BLDC GLUCOMTR-MCNC: 173 MG/DL — HIGH (ref 70–99)
GLUCOSE BLDC GLUCOMTR-MCNC: 176 MG/DL — HIGH (ref 70–99)
GLUCOSE BLDC GLUCOMTR-MCNC: 185 MG/DL — HIGH (ref 70–99)
HCT VFR BLD CALC: 34.4 % — LOW (ref 42–52)
HGB BLD-MCNC: 11.1 G/DL — LOW (ref 14–18)
MCHC RBC-ENTMCNC: 29.2 PG — SIGNIFICANT CHANGE UP (ref 27–31)
MCHC RBC-ENTMCNC: 32.3 G/DL — SIGNIFICANT CHANGE UP (ref 32–37)
MCV RBC AUTO: 90.5 FL — SIGNIFICANT CHANGE UP (ref 80–94)
NRBC # BLD: 0 /100 WBCS — SIGNIFICANT CHANGE UP (ref 0–0)
PLATELET # BLD AUTO: 170 K/UL — SIGNIFICANT CHANGE UP (ref 130–400)
RBC # BLD: 3.8 M/UL — LOW (ref 4.7–6.1)
RBC # FLD: 13.2 % — SIGNIFICANT CHANGE UP (ref 11.5–14.5)
SPECIMEN SOURCE: SIGNIFICANT CHANGE UP
SURGICAL PATHOLOGY STUDY: SIGNIFICANT CHANGE UP
WBC # BLD: 15.88 K/UL — HIGH (ref 4.8–10.8)
WBC # FLD AUTO: 15.88 K/UL — HIGH (ref 4.8–10.8)

## 2018-10-22 RX ADMIN — Medication 325 MILLIGRAM(S): at 21:28

## 2018-10-22 RX ADMIN — MEMANTINE HYDROCHLORIDE 10 MILLIGRAM(S): 10 TABLET ORAL at 05:41

## 2018-10-22 RX ADMIN — Medication 1: at 12:06

## 2018-10-22 RX ADMIN — TAMSULOSIN HYDROCHLORIDE 0.4 MILLIGRAM(S): 0.4 CAPSULE ORAL at 21:28

## 2018-10-22 RX ADMIN — Medication 250 MILLIGRAM(S): at 14:46

## 2018-10-22 RX ADMIN — Medication 325 MILLIGRAM(S): at 14:46

## 2018-10-22 RX ADMIN — Medication 1: at 17:14

## 2018-10-22 RX ADMIN — Medication 250 MILLIGRAM(S): at 05:41

## 2018-10-22 RX ADMIN — Medication 2 UNIT(S): at 12:06

## 2018-10-22 RX ADMIN — SODIUM CHLORIDE 50 MILLILITER(S): 9 INJECTION, SOLUTION INTRAVENOUS at 05:41

## 2018-10-22 RX ADMIN — DONEPEZIL HYDROCHLORIDE 10 MILLIGRAM(S): 10 TABLET, FILM COATED ORAL at 21:32

## 2018-10-22 RX ADMIN — AMLODIPINE BESYLATE 5 MILLIGRAM(S): 2.5 TABLET ORAL at 05:41

## 2018-10-22 RX ADMIN — ATORVASTATIN CALCIUM 40 MILLIGRAM(S): 80 TABLET, FILM COATED ORAL at 21:28

## 2018-10-22 RX ADMIN — Medication 200 MILLIGRAM(S): at 17:17

## 2018-10-22 RX ADMIN — Medication 325 MILLIGRAM(S): at 05:41

## 2018-10-22 RX ADMIN — Medication 250 MILLIGRAM(S): at 21:28

## 2018-10-22 RX ADMIN — Medication 2 UNIT(S): at 08:18

## 2018-10-22 RX ADMIN — Medication 200 MILLIGRAM(S): at 05:41

## 2018-10-22 RX ADMIN — MEMANTINE HYDROCHLORIDE 10 MILLIGRAM(S): 10 TABLET ORAL at 17:16

## 2018-10-22 RX ADMIN — Medication 2 UNIT(S): at 17:14

## 2018-10-22 RX ADMIN — Medication 1: at 08:18

## 2018-10-22 NOTE — PROGRESS NOTE ADULT - SUBJECTIVE AND OBJECTIVE BOX
Patient seen & examined this am.  No acute events noted overnight.   Patient resting comfortable in NAD.  Oates catheter d/c at MN and did URINATE TWICE that was non bloody   Patient continues to deny c/o pain or urinary symptoms.  No fever, chills, tremors or N/V.       I&O's Detail    21 Oct 2018 07:01  -  22 Oct 2018 07:00  --------------------------------------------------------  IN:  Total IN: 0 mL    OUT:    Indwelling Catheter - Urethral: 2500 mL    Voided: 2 mL  Total OUT: 2502 mL    Total NET: -2502 mL          Vital Signs Last 24 Hrs  T(C): 35.7 (22 Oct 2018 05:48), Max: 36.4 (21 Oct 2018 22:28)  T(F): 96.2 (22 Oct 2018 05:48), Max: 97.5 (21 Oct 2018 22:28)  HR: 75 (22 Oct 2018 05:48) (70 - 75)  BP: 138/65 (22 Oct 2018 05:48) (129/58 - 138/65)  BP(mean): --  RR: 16 (22 Oct 2018 05:48) (16 - 16)      PHYSICAL EXAM:    General: Conversant in NAD.    Back:  no CVAT or  spinal tenderness.     Gastrointestinal: + BS, soft, no distention, non-tender, no rebound or guarding or peritoneal signs.          10-21    142  |  105  |  11  ----------------------------<  172<H>  4.7   |  22  |  1.0    Ca    8.2<L>      21 Oct 2018 06:49                              10.4   17.60 )-----------( 213      ( 21 Oct 2018 21:33 )             33.1     CAPILLARY BLOOD GLUCOSE      POCT Blood Glucose.: 173 mg/dL (22 Oct 2018 07:43)  POCT Blood Glucose.: 182 mg/dL (21 Oct 2018 20:34)  POCT Blood Glucose.: 147 mg/dL (21 Oct 2018 17:15)  POCT Blood Glucose.: 152 mg/dL (21 Oct 2018 11:20)

## 2018-10-22 NOTE — PROGRESS NOTE ADULT - PROBLEM SELECTOR PLAN 1
* s/p TURP 10/17/18    - Continue to hold CBI and continue Oates catheter today and then discontinue Oates at midnight then TOV as per Dr. Lange' plan.  (Oates has 60 cc of fluid in balloon that must be removed prior to removal of catheter--RN made aware).   - Monitor H/H & transfuse PRN.  - Ivabx -- Cipro.    - Monitor WBC count -- (Improved from 20.2 to 14.7 today)  Will repeat for AM.   - F/U repeat U/A and C&S. (no results as of today).   - Case d/w Dr. Lange yesterday as to his plan & also d/w Dr. Ramirez today (covering for Dr. Lange).

## 2018-10-22 NOTE — PROGRESS NOTE ADULT - ASSESSMENT
Impression:  82 y/o male with s/p TURP on 10/17/18.    de leon d/c at 12 am  and voided     - d/w dr. rangel : RENAL/ BLADDER SONO

## 2018-10-22 NOTE — PROGRESS NOTE ADULT - PROBLEM SELECTOR PROBLEM 1
Urinary tract infection with hematuria, site unspecified
Hematuria
Urinary retention

## 2018-10-22 NOTE — CONSULT NOTE ADULT - ASSESSMENT
83y old  Male who was sent to ED by Dr. Lange after noticing his de leon bag that he had placed on last admission here was filled with dark blood and clots. Pt was seen on previous admission by urology for urinary retention and NISHANT which resolved after placing de leon which drained 1700cc on insertion. Pt was admitted for UTI and is being treated with course of antibiotics and pt;s condition is improving. Pt is currently undertaking a TOV to determine if pt can be discharged without de leon.    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 Pt ambulates with a walker and has baseline dementia. Pt is at high fall risk and recommend opthalmology eval and hearing aid for patient. Nutritionally, pt is at optimum level. PT may benefit from short term rehab as there is some evidence of overall deconditioning. REcommend discontinuing aspirin and lipitor considering patients age and comorbidities and high fall risk.    Thank you for the consult 83y old  Male who was sent to ED by Dr. Lange after noticing his de leon bag that he had placed on last admission here was filled with dark blood and clots. Pt was seen on previous admission by urology for urinary retention and NISHANT which resolved after placing de leon which drained 1700cc on insertion. Pt was admitted for UTI and is being treated with course of antibiotics and pt;s condition is improving. Pt is currently undertaking a TOV to determine if pt can be discharged without de leon.    Geriatric team recommendations:     Pt reports significant visual impairment of the left eye and reports hearing impairment of the left ear. Pt ambulates with a walker and has baseline dementia. Pt is at high fall risk and recommend opthalmology and audiology eval as outpatient. Nutritionally, pt is at optimum level. PT may benefit from short term rehab as there is some evidence of overall deconditioning, may consider discontinuing aspirin and lipitor considering patient's current comorbidities and decreased life expectancy due to frailty.

## 2018-10-22 NOTE — PROGRESS NOTE ADULT - SUBJECTIVE AND OBJECTIVE BOX
YAZAN WALKER  83y  Male      Patient is a 83y old  Male who presents with a chief complaint of urinary retention (21 Oct 2018 09:54)    on voiding trial    REVIEW OF SYSTEMS:  CONSTITUTIONAL: No fever, weight loss, or fatigue  EYES: No eye pain, visual disturbances, or discharge  ENMT:  No difficulty hearing, tinnitus, vertigo; No sinus or throat pain  NECK: No pain or stiffness  BREASTS: No pain, masses, or nipple discharge  RESPIRATORY: No cough, wheezing, chills or hemoptysis; No shortness of breath  CARDIOVASCULAR: No chest pain, palpitations, dizziness, or leg swelling  GASTROINTESTINAL: No abdominal or epigastric pain. No nausea, vomiting, or hematemesis; No diarrhea or constipation. No melena or hematochezia.  GENITOURINARY: No dysuria, frequency, hematuria, or incontinence  NEUROLOGICAL: No headaches, memory loss, loss of strength, numbness, or tremors  SKIN: No itching, burning, rashes, or lesions   LYMPH NODES: No enlarged glands  ENDOCRINE: No heat or cold intolerance; No hair loss  MUSCULOSKELETAL: No joint pain or swelling; No muscle, back, or extremity pain  PSYCHIATRIC: No depression, anxiety, mood swings, or difficulty sleeping  HEME/LYMPH: No easy bruising, or bleeding gums  ALLERY AND IMMUNOLOGIC: No hives or eczema  FAMILY HISTORY:  No pertinent family history in first degree relatives    T(C): 35.7 (10-22-18 @ 05:48), Max: 36.4 (10-21-18 @ 22:28)  HR: 75 (10-22-18 @ 05:48) (70 - 75)  BP: 138/65 (10-22-18 @ 05:48) (129/58 - 138/65)  RR: 16 (10-22-18 @ 05:48) (16 - 16)  SpO2: --  Wt(kg): --Vital Signs Last 24 Hrs  T(C): 35.7 (22 Oct 2018 05:48), Max: 36.4 (21 Oct 2018 22:28)  T(F): 96.2 (22 Oct 2018 05:48), Max: 97.5 (21 Oct 2018 22:28)  HR: 75 (22 Oct 2018 05:48) (70 - 75)  BP: 138/65 (22 Oct 2018 05:48) (129/58 - 138/65)  BP(mean): --  RR: 16 (22 Oct 2018 05:48) (16 - 16)  SpO2: --  No Known Allergies      PHYSICAL EXAM:  GENERAL: NAD,   HEAD:  Atraumatic, Normocephalic  EYES: EOMI, PERRLA, conjunctiva and sclera clear  ENMT: No tonsillar erythema, exudates, or enlargement;   NECK: Supple, No JVD, Normal thyroid  NERVOUS SYSTEM:  Alert & Oriented X3, Good concentration;   CHEST/LUNG: Clear to percussion bilaterally; No rales, rhonchi, wheezing, or rubs  HEART: Regular rate and rhythm; No murmurs, rubs, or gallops  ABDOMEN: Soft, Nontender, Nondistended; Bowel sounds present  EXTREMITIES:  2+ Peripheral Pulses, No clubbing, cyanosis, or edema  LYMPH: No lymphadenopathy noted  SKIN: No rashes or lesions      LABS:  10-21    142  |  105  |  11  ----------------------------<  172<H>  4.7   |  22  |  1.0    Ca    8.2<L>      21 Oct 2018 06:49                            10.4   17.60 )-----------( 213      ( 21 Oct 2018 21:33 )             33.1         RADIOLOGY & ADDITIONAL TESTS:    MEDICATION:  acetaminophen   Tablet .. 650 milliGRAM(s) Oral every 6 hours PRN  amLODIPine   Tablet 5 milliGRAM(s) Oral daily  amoxicillin      Capsule 250 milliGRAM(s) Oral every 8 hours  atorvastatin 40 milliGRAM(s) Oral at bedtime  ciprofloxacin   IVPB 400 milliGRAM(s) IV Intermittent every 12 hours  dextrose 40% Gel 15 Gram(s) Oral once PRN  dextrose 5%. 1000 milliLiter(s) IV Continuous <Continuous>  dextrose 50% Injectable 12.5 Gram(s) IV Push once  dextrose 50% Injectable 25 Gram(s) IV Push once  dextrose 50% Injectable 25 Gram(s) IV Push once  donepezil 10 milliGRAM(s) Oral at bedtime  ferrous    sulfate 325 milliGRAM(s) Oral three times a day  glucagon  Injectable 1 milliGRAM(s) IntraMuscular once PRN  insulin lispro (HumaLOG) corrective regimen sliding scale   SubCutaneous three times a day before meals  insulin lispro Injectable (HumaLOG) 2 Unit(s) SubCutaneous three times a day before meals  memantine 10 milliGRAM(s) Oral two times a day  morphine  - Injectable 2 milliGRAM(s) IV Push every 15 minutes PRN  ondansetron Injectable 4 milliGRAM(s) IV Push once PRN  sodium chloride 0.45%. 1000 milliLiter(s) IV Continuous <Continuous>  tamsulosin 0.4 milliGRAM(s) Oral at bedtime      HEALTH ISSUES - PROBLEM Dx:  Urinary tract infection with hematuria, site unspecified: Urinary tract infection with hematuria, site unspecified on cipro  Dementia: Dementia  DM (diabetes mellitus): DM (diabetes mellitus)  HTN (hypertension): HTN (hypertension)  Anemia: Anemia s/p prbc  Urinary retention: Urinary retention on voiding trial today  Hematuria: Hematuria resolved  will transfer to [facility] in24-48 hr

## 2018-10-22 NOTE — CONSULT NOTE ADULT - SUBJECTIVE AND OBJECTIVE BOX
YAZAN WALKER  MRN-[id number]    HISTORY OF PRESENT ILLNESS:    HPI:  83y old  Male who was sent to ED by Dr. Lange after noticing his de leon bag that he had placed on last admission here was filled with dark blood and clots. Pt was seen on previous admission by urology for urinary retention and NISHANT which resolved after placing de leon which drained 1700cc on insertion. Pt was due for routine de leon cath change today. Pt is poor historian and it is unclear when the hematuria started. (11 Oct 2018 21:56)      PMH/PSH:  PAST MEDICAL & SURGICAL HISTORY:  Anemia  Dementia  Kidney disease  DM (diabetes mellitus)  HTN (hypertension)  H/O knee surgery: right knee    ALLERGIES:  Allergies    No Known Allergies    Intolerances      SOCIAL HABITS:    FAMILY HISTORY:   FAMILY HISTORY:  No pertinent family history in first degree relatives    Immunizations: None      REVIEW OF SYSTEM:  Elements of review of systems are negative or non-applicable except as noted above in HPI section.       HOME MEDICATIONS:  amLODIPine 5 mg oral tablet  Aspir 81 oral delayed release tablet  atorvastatin 40 mg oral tablet  donepezil 10 mg oral tablet  ferrous sulfate 324 mg (65 mg elemental iron) oral tablet  Flomax 0.4 mg oral capsule  memantine 10 mg oral tablet  sodium bicarbonate 650 mg oral tablet    MEDICATIONS:  MEDICATIONS  (STANDING):  amLODIPine   Tablet 5 milliGRAM(s) Oral daily  amoxicillin      Capsule 250 milliGRAM(s) Oral every 8 hours  atorvastatin 40 milliGRAM(s) Oral at bedtime  ciprofloxacin   IVPB 400 milliGRAM(s) IV Intermittent every 12 hours  dextrose 5%. 1000 milliLiter(s) (50 mL/Hr) IV Continuous <Continuous>  dextrose 50% Injectable 12.5 Gram(s) IV Push once  dextrose 50% Injectable 25 Gram(s) IV Push once  dextrose 50% Injectable 25 Gram(s) IV Push once  donepezil 10 milliGRAM(s) Oral at bedtime  ferrous    sulfate 325 milliGRAM(s) Oral three times a day  insulin lispro (HumaLOG) corrective regimen sliding scale   SubCutaneous three times a day before meals  insulin lispro Injectable (HumaLOG) 2 Unit(s) SubCutaneous three times a day before meals  memantine 10 milliGRAM(s) Oral two times a day  tamsulosin 0.4 milliGRAM(s) Oral at bedtime    MEDICATIONS  (PRN):  acetaminophen   Tablet .. 650 milliGRAM(s) Oral every 6 hours PRN Temp greater or equal to 38C (100.4F), Mild Pain (1 - 3)  dextrose 40% Gel 15 Gram(s) Oral once PRN Blood Glucose LESS THAN 70 milliGRAM(s)/deciliter  glucagon  Injectable 1 milliGRAM(s) IntraMuscular once PRN Glucose LESS THAN 70 milligrams/deciliter  morphine  - Injectable 2 milliGRAM(s) IV Push every 15 minutes PRN Severe Pain (7 - 10)  ondansetron Injectable 4 milliGRAM(s) IV Push once PRN Nausea and/or Vomiting        VITALS:   Vital Signs Last 24 Hrs  T(C): 35.7 (22 Oct 2018 05:48), Max: 36.4 (21 Oct 2018 22:28)  T(F): 96.2 (22 Oct 2018 05:48), Max: 97.5 (21 Oct 2018 22:28)  HR: 75 (22 Oct 2018 05:48) (70 - 75)  BP: 138/65 (22 Oct 2018 05:48) (129/58 - 138/65)  BP(mean): --  RR: 16 (22 Oct 2018 05:48) (16 - 16)  SpO2: --        PHYSICAL EXAM:    GENERAL: NAD, well-developed  HEAD:  Atraumatic, Normocephalic  NECK: Supple, No JVD  CHEST/LUNG: Clear to auscultation bilaterally; No wheeze  HEART: Regular rate and rhythm; No murmurs, rubs, or gallops  ABDOMEN: Soft, Nontender, Nondistended; Bowel sounds present  EXTREMITIES:  Good peripheral Pulses, No clubbing, cyanosis, or edema      LABS:                        10.4   17.60 )-----------( 213      ( 21 Oct 2018 21:33 )             33.1     10-21    142  |  105  |  11  ----------------------------<  172<H>  4.7   |  22  |  1.0    Ca    8.2<L>      21 Oct 2018 06:49                Culture - Urine (collected 10-20-18 @ 03:40)  Source: .Urine Catheterized  Final Report (10-21-18 @ 22:29):    No growth      Urinalysis Basic - ( 21 Oct 2018 09:45 )    Color: Yellow / Appearance: Clear / S.025 / pH: x  Gluc: x / Ketone: Negative  / Bili: Negative / Urobili: 0.2 mg/dL   Blood: x / Protein: 100 mg/dL / Nitrite: Negative   Leuk Esterase: Small / RBC: 6-10 /HPF / WBC 3-5 /HPF   Sq Epi: x / Non Sq Epi: Occasional /HPF / Bacteria: Few          ABG & VENT SETTINGS (when applicable)        DIAGNOSTIC STUDIES:                10.4<L>                x    | x    | x            17.60<H> >-----------< 213     ------------------------< x                     33.1<L>                x    | x    | x                                                                         Ca x     Mg x     Ph x        ,             9.5<L>                142  | 22   | 11           14.75<H> >-----------< 302     ------------------------< 172<H>                 29.2<L>                4.7  | 105  | 1.0                                                                       Ca 8.2<L> Mg x     Ph x YAZAN WALKER  MRN-[id number]    HISTORY OF PRESENT ILLNESS:    HPI:  83y old  Male who was sent to ED by Dr. Lange after noticing his de leon bag that he had placed on last admission here was filled with dark blood and clots. Pt was seen on previous admission by urology for urinary retention and NISHANT which resolved after placing de leon which drained 1700cc on insertion. Pt was due for routine de leon cath change today. Pt is poor historian and it is unclear when the hematuria started. (11 Oct 2018 21:56)      PMH/PSH:  PAST MEDICAL & SURGICAL HISTORY:  Anemia  Dementia  Kidney disease  DM (diabetes mellitus)  HTN (hypertension)  H/O knee surgery: right knee    ALLERGIES:  Allergies    No Known Allergies      SOCIAL HABITS:    FAMILY HISTORY:   FAMILY HISTORY:  Mother: Brain aneurysm  Father: Prostate cancer    Immunizations: None  HCM: Colonoscopy 2017: wnl        REVIEW OF SYSTEM:  Elements of review of systems are negative or non-applicable except as noted above in HPI section.       HOME MEDICATIONS:  amLODIPine 5 mg oral tablet  Aspir 81 oral delayed release tablet  atorvastatin 40 mg oral tablet  donepezil 10 mg oral tablet  ferrous sulfate 324 mg (65 mg elemental iron) oral tablet  Flomax 0.4 mg oral capsule  memantine 10 mg oral tablet  sodium bicarbonate 650 mg oral tablet    MEDICATIONS:  MEDICATIONS  (STANDING):  amLODIPine   Tablet 5 milliGRAM(s) Oral daily  amoxicillin      Capsule 250 milliGRAM(s) Oral every 8 hours  atorvastatin 40 milliGRAM(s) Oral at bedtime  ciprofloxacin   IVPB 400 milliGRAM(s) IV Intermittent every 12 hours  dextrose 5%. 1000 milliLiter(s) (50 mL/Hr) IV Continuous <Continuous>  dextrose 50% Injectable 12.5 Gram(s) IV Push once  dextrose 50% Injectable 25 Gram(s) IV Push once  dextrose 50% Injectable 25 Gram(s) IV Push once  donepezil 10 milliGRAM(s) Oral at bedtime  ferrous    sulfate 325 milliGRAM(s) Oral three times a day  insulin lispro (HumaLOG) corrective regimen sliding scale   SubCutaneous three times a day before meals  insulin lispro Injectable (HumaLOG) 2 Unit(s) SubCutaneous three times a day before meals  memantine 10 milliGRAM(s) Oral two times a day  tamsulosin 0.4 milliGRAM(s) Oral at bedtime    MEDICATIONS  (PRN):  acetaminophen   Tablet .. 650 milliGRAM(s) Oral every 6 hours PRN Temp greater or equal to 38C (100.4F), Mild Pain (1 - 3)  dextrose 40% Gel 15 Gram(s) Oral once PRN Blood Glucose LESS THAN 70 milliGRAM(s)/deciliter  glucagon  Injectable 1 milliGRAM(s) IntraMuscular once PRN Glucose LESS THAN 70 milligrams/deciliter  morphine  - Injectable 2 milliGRAM(s) IV Push every 15 minutes PRN Severe Pain (7 - 10)  ondansetron Injectable 4 milliGRAM(s) IV Push once PRN Nausea and/or Vomiting        VITALS:   Vital Signs Last 24 Hrs  T(C): 35.7 (22 Oct 2018 05:48), Max: 36.4 (21 Oct 2018 22:28)  T(F): 96.2 (22 Oct 2018 05:48), Max: 97.5 (21 Oct 2018 22:28)  HR: 75 (22 Oct 2018 05:48) (70 - 75)  BP: 138/65 (22 Oct 2018 05:48) (129/58 - 138/65)  BP(mean): --  RR: 16 (22 Oct 2018 05:48) (16 - 16)  SpO2: --        PHYSICAL EXAM:    GENERAL: NAD, well-developed  HEAD:  Atraumatic, Normocephalic  NECK: Supple, No JVD  CHEST/LUNG: Clear to auscultation bilaterally; No wheeze  HEART: Regular rate and rhythm; No murmurs, rubs, or gallops  ABDOMEN: Soft, Nontender, Nondistended; Bowel sounds present  EXTREMITIES:  Good peripheral Pulses, No clubbing, cyanosis, or edema  Neuro: AAOx1 (baseline)      LABS:                        10.4   17.60 )-----------( 213      ( 21 Oct 2018 21:33 )             33.1     10-21    142  |  105  |  11  ----------------------------<  172<H>  4.7   |  22  |  1.0    Ca    8.2<L>      21 Oct 2018 06:49                Culture - Urine (collected 10-20-18 @ 03:40)  Source: .Urine Catheterized  Final Report (10-21-18 @ 22:29):    No growth      Urinalysis Basic - ( 21 Oct 2018 09:45 )    Color: Yellow / Appearance: Clear / S.025 / pH: x  Gluc: x / Ketone: Negative  / Bili: Negative / Urobili: 0.2 mg/dL   Blood: x / Protein: 100 mg/dL / Nitrite: Negative   Leuk Esterase: Small / RBC: 6-10 /HPF / WBC 3-5 /HPF   Sq Epi: x / Non Sq Epi: Occasional /HPF / Bacteria: Few          ABG & VENT SETTINGS (when applicable)        DIAGNOSTIC STUDIES:                10.4<L>                x    | x    | x            17.60<H> >-----------< 213     ------------------------< x                     33.1<L>                x    | x    | x                                                                         Ca x     Mg x     Ph x        ,             9.5<L>                142  | 22   | 11           14.75<H> >-----------< 302     ------------------------< 172<H>                 29.2<L>                4.7  | 105  | 1.0                                                                       Ca 8.2<L> Mg x     Ph x            Cognitive assessment; No evidence of delirium | No signs of depression | Pt has dementia at baseline (MMSE <20)  Nutritional Assessment: T Protein 5.1. Albumin 2.3. WEight 92kg and Height 167cm. BMI 33kg/m2  Mobility: Ambulates with walker  Pressure Ulcer Risk Assessment: Risk present  Polypharmacy: Absent YAZAN WALKER  MRN-[id number]    HISTORY OF PRESENT ILLNESS:    HPI:  83y old  Male who was sent to ED by Dr. Lange after noticing his de leon bag that he had placed on last admission here was filled with dark blood and clots. Pt was seen on previous admission by urology for urinary retention and NISHANT which resolved after placing de leon which drained 1700cc on insertion. Pt was due for routine de leon cath change today. Pt is poor historian and it is unclear when the hematuria started. (11 Oct 2018 21:56)      PMH/PSH:  PAST MEDICAL & SURGICAL HISTORY:  Anemia  Dementia  Kidney disease  DM (diabetes mellitus)  HTN (hypertension)  H/O knee surgery: right knee    ALLERGIES:  Allergies    No Known Allergies      SOCIAL HABITS:    FAMILY HISTORY:   FAMILY HISTORY:  Mother: Brain aneurysm  Father: Prostate cancer    Immunizations: None  HCM: Colonoscopy 2017: wnl        REVIEW OF SYSTEM:  Elements of review of systems are negative or non-applicable except as noted above in HPI section.       HOME MEDICATIONS:  amLODIPine 5 mg oral tablet  Aspir 81 oral delayed release tablet  atorvastatin 40 mg oral tablet  donepezil 10 mg oral tablet  ferrous sulfate 324 mg (65 mg elemental iron) oral tablet  Flomax 0.4 mg oral capsule  memantine 10 mg oral tablet  sodium bicarbonate 650 mg oral tablet    MEDICATIONS:  MEDICATIONS  (STANDING):  amLODIPine   Tablet 5 milliGRAM(s) Oral daily  amoxicillin      Capsule 250 milliGRAM(s) Oral every 8 hours  atorvastatin 40 milliGRAM(s) Oral at bedtime  ciprofloxacin   IVPB 400 milliGRAM(s) IV Intermittent every 12 hours  dextrose 5%. 1000 milliLiter(s) (50 mL/Hr) IV Continuous <Continuous>  dextrose 50% Injectable 12.5 Gram(s) IV Push once  dextrose 50% Injectable 25 Gram(s) IV Push once  dextrose 50% Injectable 25 Gram(s) IV Push once  donepezil 10 milliGRAM(s) Oral at bedtime  ferrous    sulfate 325 milliGRAM(s) Oral three times a day  insulin lispro (HumaLOG) corrective regimen sliding scale   SubCutaneous three times a day before meals  insulin lispro Injectable (HumaLOG) 2 Unit(s) SubCutaneous three times a day before meals  memantine 10 milliGRAM(s) Oral two times a day  tamsulosin 0.4 milliGRAM(s) Oral at bedtime    MEDICATIONS  (PRN):  acetaminophen   Tablet .. 650 milliGRAM(s) Oral every 6 hours PRN Temp greater or equal to 38C (100.4F), Mild Pain (1 - 3)  dextrose 40% Gel 15 Gram(s) Oral once PRN Blood Glucose LESS THAN 70 milliGRAM(s)/deciliter  glucagon  Injectable 1 milliGRAM(s) IntraMuscular once PRN Glucose LESS THAN 70 milligrams/deciliter  morphine  - Injectable 2 milliGRAM(s) IV Push every 15 minutes PRN Severe Pain (7 - 10)  ondansetron Injectable 4 milliGRAM(s) IV Push once PRN Nausea and/or Vomiting        VITALS:   Vital Signs Last 24 Hrs  T(C): 35.7 (22 Oct 2018 05:48), Max: 36.4 (21 Oct 2018 22:28)  T(F): 96.2 (22 Oct 2018 05:48), Max: 97.5 (21 Oct 2018 22:28)  HR: 75 (22 Oct 2018 05:48) (70 - 75)  BP: 138/65 (22 Oct 2018 05:48) (129/58 - 138/65)  BP(mean): --  RR: 16 (22 Oct 2018 05:48) (16 - 16)  SpO2: --        PHYSICAL EXAM:    GENERAL: NAD, well-developed  HEAD:  Atraumatic, Normocephalic  NECK: Supple, No JVD  CHEST/LUNG: Clear to auscultation bilaterally; No wheeze  HEART: Regular rate and rhythm; No murmurs, rubs, or gallops  ABDOMEN: Soft, Nontender, Nondistended; Bowel sounds present  EXTREMITIES:  Good peripheral Pulses, No clubbing, cyanosis, or edema  Neuro: AAOx1 (baseline)      LABS:                        10.4   17.60 )-----------( 213      ( 21 Oct 2018 21:33 )             33.1     10-21    142  |  105  |  11  ----------------------------<  172<H>  4.7   |  22  |  1.0    Ca    8.2<L>      21 Oct 2018 06:49                Culture - Urine (collected 10-20-18 @ 03:40)  Source: .Urine Catheterized  Final Report (10-21-18 @ 22:29):    No growth      Urinalysis Basic - ( 21 Oct 2018 09:45 )    Color: Yellow / Appearance: Clear / S.025 / pH: x  Gluc: x / Ketone: Negative  / Bili: Negative / Urobili: 0.2 mg/dL   Blood: x / Protein: 100 mg/dL / Nitrite: Negative   Leuk Esterase: Small / RBC: 6-10 /HPF / WBC 3-5 /HPF   Sq Epi: x / Non Sq Epi: Occasional /HPF / Bacteria: Few          ABG & VENT SETTINGS (when applicable)        DIAGNOSTIC STUDIES:                10.4<L>                x    | x    | x            17.60<H> >-----------< 213     ------------------------< x                     33.1<L>                x    | x    | x                                                                         Ca x     Mg x     Ph x        ,             9.5<L>                142  | 22   | 11           14.75<H> >-----------< 302     ------------------------< 172<H>                 29.2<L>                4.7  | 105  | 1.0                                                                       Ca 8.2<L> Mg x     Ph x          Visual Impairment: Yes - unable to see with left eye  Hearing impairment: Yes - unable to hear with left ear  Cognitive assessment; No evidence of delirium | No signs of depression | Pt has dementia at baseline (MMSE <20)  Nutritional Assessment: T Protein 5.1. Albumin 2.3. WEight 92kg and Height 167cm. BMI 33kg/m2  Mobility: Ambulates with walker  Pressure Ulcer Risk Assessment: Risk present  Polypharmacy: Absent

## 2018-10-23 ENCOUNTER — TRANSCRIPTION ENCOUNTER (OUTPATIENT)
Age: 83
End: 2018-10-23

## 2018-10-23 LAB
ANION GAP SERPL CALC-SCNC: 14 MMOL/L — SIGNIFICANT CHANGE UP (ref 7–14)
BUN SERPL-MCNC: 11 MG/DL — SIGNIFICANT CHANGE UP (ref 10–20)
CALCIUM SERPL-MCNC: 8.5 MG/DL — SIGNIFICANT CHANGE UP (ref 8.5–10.1)
CHLORIDE SERPL-SCNC: 107 MMOL/L — SIGNIFICANT CHANGE UP (ref 98–110)
CO2 SERPL-SCNC: 24 MMOL/L — SIGNIFICANT CHANGE UP (ref 17–32)
CREAT SERPL-MCNC: 1.2 MG/DL — SIGNIFICANT CHANGE UP (ref 0.7–1.5)
GLUCOSE BLDC GLUCOMTR-MCNC: 117 MG/DL — HIGH (ref 70–99)
GLUCOSE BLDC GLUCOMTR-MCNC: 121 MG/DL — HIGH (ref 70–99)
GLUCOSE BLDC GLUCOMTR-MCNC: 142 MG/DL — HIGH (ref 70–99)
GLUCOSE BLDC GLUCOMTR-MCNC: 252 MG/DL — HIGH (ref 70–99)
GLUCOSE SERPL-MCNC: 98 MG/DL — SIGNIFICANT CHANGE UP (ref 70–99)
POTASSIUM SERPL-MCNC: 4.1 MMOL/L — SIGNIFICANT CHANGE UP (ref 3.5–5)
POTASSIUM SERPL-SCNC: 4.1 MMOL/L — SIGNIFICANT CHANGE UP (ref 3.5–5)
SODIUM SERPL-SCNC: 145 MMOL/L — SIGNIFICANT CHANGE UP (ref 135–146)

## 2018-10-23 RX ORDER — CIPROFLOXACIN LACTATE 400MG/40ML
1 VIAL (ML) INTRAVENOUS
Qty: 0 | Refills: 0 | COMMUNITY
Start: 2018-10-23

## 2018-10-23 RX ORDER — CIPROFLOXACIN LACTATE 400MG/40ML
500 VIAL (ML) INTRAVENOUS EVERY 12 HOURS
Qty: 0 | Refills: 0 | Status: DISCONTINUED | OUTPATIENT
Start: 2018-10-23 | End: 2018-10-24

## 2018-10-23 RX ADMIN — TAMSULOSIN HYDROCHLORIDE 0.4 MILLIGRAM(S): 0.4 CAPSULE ORAL at 21:50

## 2018-10-23 RX ADMIN — Medication 325 MILLIGRAM(S): at 06:02

## 2018-10-23 RX ADMIN — Medication 2 UNIT(S): at 17:31

## 2018-10-23 RX ADMIN — DONEPEZIL HYDROCHLORIDE 10 MILLIGRAM(S): 10 TABLET, FILM COATED ORAL at 21:49

## 2018-10-23 RX ADMIN — Medication 200 MILLIGRAM(S): at 06:02

## 2018-10-23 RX ADMIN — Medication 325 MILLIGRAM(S): at 14:16

## 2018-10-23 RX ADMIN — MEMANTINE HYDROCHLORIDE 10 MILLIGRAM(S): 10 TABLET ORAL at 17:31

## 2018-10-23 RX ADMIN — MEMANTINE HYDROCHLORIDE 10 MILLIGRAM(S): 10 TABLET ORAL at 06:02

## 2018-10-23 RX ADMIN — Medication 3: at 08:17

## 2018-10-23 RX ADMIN — Medication 500 MILLIGRAM(S): at 17:30

## 2018-10-23 RX ADMIN — Medication 2 UNIT(S): at 11:58

## 2018-10-23 RX ADMIN — AMLODIPINE BESYLATE 5 MILLIGRAM(S): 2.5 TABLET ORAL at 06:02

## 2018-10-23 RX ADMIN — Medication 325 MILLIGRAM(S): at 21:49

## 2018-10-23 RX ADMIN — Medication 2 UNIT(S): at 08:17

## 2018-10-23 RX ADMIN — Medication 250 MILLIGRAM(S): at 06:02

## 2018-10-23 RX ADMIN — ATORVASTATIN CALCIUM 40 MILLIGRAM(S): 80 TABLET, FILM COATED ORAL at 21:49

## 2018-10-23 NOTE — DISCHARGE NOTE ADULT - CARE PLAN
Principal Discharge DX:	Hematuria, unspecified type  Goal:	to be healthy and continue care  Assessment and plan of treatment:	follow up with urology   cont. meds as directed

## 2018-10-23 NOTE — PROGRESS NOTE ADULT - ATTENDING COMMENTS
I have seen and evaluated the patient    1 - stop CBI in AM  2 - plan to DC catheter in 48 hours  for trial of void  3 - daily labs  4 - cont abx
Pt seen and examined   Agree with PA note above  unable to measure real PVR as cannot ask pt to follow directions  no blood in bed (pt incontinent)  follow up with Dr Lange outpt
optimization for surgical intervention  patient will need in-hospital cystoscopy
transfuse to appropriate HCT  continue abx  anticipate OR on 10/17/2018
discussed with PA  the patient was seen in my office and sent to the ER  he needs to be optimized   anticipate operative intervention once cleared to assess etiology of hematuria  prostate vs bladder bleeding  etiology of wbc elevation (funguria ?)
Pt examined, agree with above. awaiting sono
Patient with advanced age and poor functional capacity. Patient is currently hemodynamically stable with active bleed. Patient is a moderate risk for surgical intervention, however procedure is necessary to control source of active bleed. Continue to monitor H/H, lytes.

## 2018-10-23 NOTE — DISCHARGE NOTE ADULT - CARE PROVIDER_API CALL
Namita Burgess), Internal Medicine  [address] [place] [id number]  Phone: [phone]  Fax: [phone]

## 2018-10-23 NOTE — DISCHARGE NOTE ADULT - MEDICATION SUMMARY - MEDICATIONS TO TAKE
I will START or STAY ON the medications listed below when I get home from the hospital:    Aspir 81 oral delayed release tablet  -- 1 tab(s) by mouth once a day  -- Indication: For cad    Flomax 0.4 mg oral capsule  -- 1 cap(s) by mouth once a day  -- Indication: For bph    atorvastatin 40 mg oral tablet  -- 1 tab(s) by mouth once a day (at bedtime)  -- Indication: For Hld    amLODIPine 5 mg oral tablet  -- 1 tab(s) by mouth once a day  -- Indication: For HTN (hypertension)    donepezil 10 mg oral tablet  -- 1 tab(s) by mouth once a day (at bedtime)  -- Indication: For Dementia    ferrous sulfate 324 mg (65 mg elemental iron) oral tablet  -- orally once a day  -- Indication: For Anemia    memantine 10 mg oral tablet  -- 1 tab(s) by mouth 2 times a day  -- Indication: For cns    ciprofloxacin 500 mg oral tablet  -- 1 tab(s) by mouth every 12 hours  -- Indication: For Antibiotic

## 2018-10-23 NOTE — PROGRESS NOTE ADULT - SUBJECTIVE AND OBJECTIVE BOX
YAZAN WALKER  83y  Male      Patient is a 83y old  Male who presents with a chief complaint of urinary retention (22 Oct 2018 10:12)    s/p de leon out ,pt is voiding well    REVIEW OF SYSTEMS:  CONSTITUTIONAL: No fever, weight loss, or fatigue  EYES: No eye pain, visual disturbances, or discharge  ENMT:  No difficulty hearing, tinnitus, vertigo; No sinus or throat pain  NECK: No pain or stiffness  BREASTS: No pain, masses, or nipple discharge  RESPIRATORY: No cough, wheezing, chills or hemoptysis; No shortness of breath  CARDIOVASCULAR: No chest pain, palpitations, dizziness, or leg swelling  GASTROINTESTINAL: No abdominal or epigastric pain. No nausea, vomiting, or hematemesis; No diarrhea or constipation. No melena or hematochezia.  GENITOURINARY: No dysuria, frequency, hematuria, or incontinence  NEUROLOGICAL: No headaches, memory loss, loss of strength, numbness, or tremors  SKIN: No itching, burning, rashes, or lesions   LYMPH NODES: No enlarged glands  ENDOCRINE: No heat or cold intolerance; No hair loss  MUSCULOSKELETAL: No joint pain or swelling; No muscle, back, or extremity pain  PSYCHIATRIC: No depression, anxiety, mood swings, or difficulty sleeping  HEME/LYMPH: No easy bruising, or bleeding gums  ALLERY AND IMMUNOLOGIC: No hives or eczema  FAMILY HISTORY:  No pertinent family history in first degree relatives    T(C): 36.2 (10-23-18 @ 06:02), Max: 36.8 (10-22-18 @ 14:00)  HR: 78 (10-23-18 @ 06:02) (78 - 82)  BP: 144/66 (10-23-18 @ 06:02) (138/94 - 144/66)  RR: 17 (10-23-18 @ 06:02) (16 - 17)  SpO2: --  Wt(kg): --Vital Signs Last 24 Hrs  T(C): 36.2 (23 Oct 2018 06:02), Max: 36.8 (22 Oct 2018 14:00)  T(F): 97.2 (23 Oct 2018 06:02), Max: 98.2 (22 Oct 2018 14:00)  HR: 78 (23 Oct 2018 06:02) (78 - 82)  BP: 144/66 (23 Oct 2018 06:02) (138/94 - 144/66)  BP(mean): --  RR: 17 (23 Oct 2018 06:02) (16 - 17)  SpO2: --  No Known Allergies      PHYSICAL EXAM:  GENERAL: NAD, well-groomed, well-developed  HEAD:  Atraumatic, Normocephalic  EYES: EOMI, PERRLA, conjunctiva and sclera clear  ENMT: No tonsillar erythema, exudates, or enlargement; Moist mucous membranes, Good dentition, No lesions  NECK: Supple, No JVD, Normal thyroid  NERVOUS SYSTEM:  Alert & Oriented X3, Good concentration;  CHEST/LUNG: Clear to percussion bilaterally; No rales, rhonchi, wheezing, or rubs  HEART: Regular rate and rhythm; No murmurs, rubs, or gallops  ABDOMEN: Soft, Nontender, Nondistended; Bowel sounds present  EXTREMITIES:  2+ Peripheral Pulses, No clubbing, cyanosis, or edema  LYMPH: No lymphadenopathy noted  SKIN: No rashes or lesions      LABS:                          11.1   15.88 )-----------( 170      ( 22 Oct 2018 14:10 )             34.4             RADIOLOGY & ADDITIONAL TESTS:    MEDICATION:  acetaminophen   Tablet .. 650 milliGRAM(s) Oral every 6 hours PRN  amLODIPine   Tablet 5 milliGRAM(s) Oral daily  amoxicillin      Capsule 250 milliGRAM(s) Oral every 8 hours  atorvastatin 40 milliGRAM(s) Oral at bedtime  ciprofloxacin     Tablet 500 milliGRAM(s) Oral every 12 hours  dextrose 40% Gel 15 Gram(s) Oral once PRN  dextrose 5%. 1000 milliLiter(s) IV Continuous <Continuous>  dextrose 50% Injectable 12.5 Gram(s) IV Push once  dextrose 50% Injectable 25 Gram(s) IV Push once  dextrose 50% Injectable 25 Gram(s) IV Push once  donepezil 10 milliGRAM(s) Oral at bedtime  ferrous    sulfate 325 milliGRAM(s) Oral three times a day  glucagon  Injectable 1 milliGRAM(s) IntraMuscular once PRN  insulin lispro (HumaLOG) corrective regimen sliding scale   SubCutaneous three times a day before meals  insulin lispro Injectable (HumaLOG) 2 Unit(s) SubCutaneous three times a day before meals  memantine 10 milliGRAM(s) Oral two times a day  morphine  - Injectable 2 milliGRAM(s) IV Push every 15 minutes PRN  ondansetron Injectable 4 milliGRAM(s) IV Push once PRN  tamsulosin 0.4 milliGRAM(s) Oral at bedtime      HEALTH ISSUES - PROBLEM Dx:  Urinary tract infection with hematuria, site unspecified: Urinary tract infection with hematuria, site unspecified, cipro for 1 week  Dementia: Dementia  DM (diabetes mellitus): DM (diabetes mellitus)  HTN (hypertension): HTN (hypertension)  Anemia: Anemia s/p prbc  Urinary retention: Urinary retention off de leon ,pt is voiding  Hematuria: Hematuria,resolved

## 2018-10-23 NOTE — PROGRESS NOTE ADULT - SUBJECTIVE AND OBJECTIVE BOX
S: Per RN, pt is incontinent of urine, having wet chucks throughout the day.  Patient denies any abdominal pain  O: ICU Vital Signs Last 24 Hrs  T(C): 35.8 (23 Oct 2018 14:01), Max: 36.8 (22 Oct 2018 21:00)  T(F): 96.5 (23 Oct 2018 14:01), Max: 98.2 (22 Oct 2018 21:00)  HR: 77 (23 Oct 2018 14:01) (77 - 82)  BP: 120/75 (23 Oct 2018 14:01) (120/75 - 144/66)  RR: 16 (23 Oct 2018 14:01) (16 - 17)    EXAM:  abd: soft, NT/ND    labs:                         11.1   15.88 )-----------( 170      ( 22 Oct 2018 14:10 )             34.4         RADIOLOGY:      < from: US Kidney and Bladder (10.22.18 @ 17:57) >  IMPRESSION:  1.  Since October 11, 2018, unchanged mild to moderate bilateral   hydronephrosis.    2.  Patient unable to void with a prevoid bladder volume of approximately   350 cc, consistent with urinary retention.    3.  A 6.5 cm hypoechoic structure projecting into the urinary bladder   most likely represents portion of the enlarged prostate gland; TURP   defect not well delineated. Residual hemorrhage within the bladder is   also a possibility.

## 2018-10-23 NOTE — PROGRESS NOTE ADULT - ASSESSMENT
· Assessment		  Impression:  82 y/o male with s/p TURP on 10/17/18.    de leon d/c at 12 am  and voided     - d/w dr. rangel : RENAL/ BLADDER SONO · Assessment		  Impression:  82 y/o male with s/p TURP on 10/17/18.    Case D/W Dr. Lange:   recommend to repeat BMP and renal/bladder sono:

## 2018-10-23 NOTE — DISCHARGE NOTE ADULT - HOSPITAL COURSE
HEALTH ISSUES - PROBLEM Dx:  Urinary tract infection with hematuria, site unspecified: Urinary tract infection with hematuria, site unspecified cipro for 1 week  Dementia: Dementia  DM (diabetes mellitus): DM (diabetes mellitus)  HTN (hypertension): HTN (hypertension)  Anemia: Anemia,s/p 2 prbc  Urinary retention: Urinary retention resolved  Hematuria: Hematuria resolved

## 2018-10-23 NOTE — DISCHARGE NOTE ADULT - PATIENT PORTAL LINK FT
You can access the [facility], offered by [facility], by registering with the following website: [email or web address]

## 2018-10-24 VITALS
SYSTOLIC BLOOD PRESSURE: 137 MMHG | HEART RATE: 80 BPM | RESPIRATION RATE: 16 BRPM | DIASTOLIC BLOOD PRESSURE: 73 MMHG | TEMPERATURE: 96 F

## 2018-10-24 LAB — GLUCOSE BLDC GLUCOMTR-MCNC: 160 MG/DL — HIGH (ref 70–99)

## 2018-10-24 RX ADMIN — Medication 325 MILLIGRAM(S): at 05:43

## 2018-10-24 RX ADMIN — Medication 500 MILLIGRAM(S): at 05:44

## 2018-10-24 RX ADMIN — AMLODIPINE BESYLATE 5 MILLIGRAM(S): 2.5 TABLET ORAL at 05:44

## 2018-10-24 RX ADMIN — MEMANTINE HYDROCHLORIDE 10 MILLIGRAM(S): 10 TABLET ORAL at 05:43

## 2018-10-24 NOTE — PROGRESS NOTE ADULT - SUBJECTIVE AND OBJECTIVE BOX
YAZAN WALKER  83y  Male      Patient is a 83y old  Male who presents with a chief complaint of urinary retention (23 Oct 2018 16:46)    off de leon,pt is voiding    REVIEW OF SYSTEMS:  CONSTITUTIONAL: No fever, weight loss, or fatigue  EYES: No eye pain, visual disturbances, or discharge  ENMT:  No difficulty hearing, tinnitus, vertigo; No sinus or throat pain  NECK: No pain or stiffness  BREASTS: No pain, masses, or nipple discharge  RESPIRATORY: No cough, wheezing, chills or hemoptysis; No shortness of breath  CARDIOVASCULAR: No chest pain, palpitations, dizziness, or leg swelling  GASTROINTESTINAL: No abdominal or epigastric pain. No nausea, vomiting, or hematemesis; No diarrhea or constipation. No melena or hematochezia.  GENITOURINARY: No dysuria, frequency, hematuria, or incontinence  NEUROLOGICAL: No headaches, memory loss, loss of strength, numbness, or tremors  SKIN: No itching, burning, rashes, or lesions   LYMPH NODES: No enlarged glands  ENDOCRINE: No heat or cold intolerance; No hair loss  MUSCULOSKELETAL: No joint pain or swelling; No muscle, back, or extremity pain  PSYCHIATRIC: No depression, anxiety, mood swings, or difficulty sleeping  HEME/LYMPH: No easy bruising, or bleeding gums  ALLERY AND IMMUNOLOGIC: No hives or eczema  FAMILY HISTORY:  No pertinent family history in first degree relatives    T(C): 35.8 (10-24-18 @ 05:15), Max: 35.8 (10-23-18 @ 14:01)  HR: 80 (10-24-18 @ 05:15) (77 - 80)  BP: 137/73 (10-24-18 @ 05:15) (120/75 - 137/73)  RR: 16 (10-24-18 @ 05:15) (16 - 16)  SpO2: --  Wt(kg): --Vital Signs Last 24 Hrs  T(C): 35.8 (24 Oct 2018 05:15), Max: 35.8 (23 Oct 2018 14:01)  T(F): 96.5 (24 Oct 2018 05:15), Max: 96.5 (23 Oct 2018 14:01)  HR: 80 (24 Oct 2018 05:15) (77 - 80)  BP: 137/73 (24 Oct 2018 05:15) (120/75 - 137/73)  BP(mean): --  RR: 16 (24 Oct 2018 05:15) (16 - 16)  SpO2: --  No Known Allergies      PHYSICAL EXAM:  GENERAL: NAD,   HEAD:  Atraumatic, Normocephalic  EYES: EOMI, PERRLA, conjunctiva and sclera clear  ENMT: No tonsillar erythema, exudates, or enlargement; Moist mucous membranes, Good dentition, No lesions  NECK: Supple, No JVD, Normal thyroid  NERVOUS SYSTEM:  Alert & Oriented X3, Good concentration;  CHEST/LUNG: Clear to percussion bilaterally; No rales, rhonchi, wheezing, or rubs  HEART: Regular rate and rhythm; No murmurs, rubs, or gallops  ABDOMEN: Soft, Nontender, Nondistended; Bowel sounds present  EXTREMITIES:  2+ Peripheral Pulses, No clubbing, cyanosis, or edema  LYMPH: No lymphadenopathy noted  SKIN: No rashes or lesions      LABS:  10-23    145  |  107  |  11  ----------------------------<  98  4.1   |  24  |  1.2    Ca    8.5      23 Oct 2018 19:30                            11.1   15.88 )-----------( 170      ( 22 Oct 2018 14:10 )             34.4         RADIOLOGY & ADDITIONAL TESTS:    MEDICATION:  acetaminophen   Tablet .. 650 milliGRAM(s) Oral every 6 hours PRN  amLODIPine   Tablet 5 milliGRAM(s) Oral daily  atorvastatin 40 milliGRAM(s) Oral at bedtime  ciprofloxacin     Tablet 500 milliGRAM(s) Oral every 12 hours  dextrose 40% Gel 15 Gram(s) Oral once PRN  dextrose 5%. 1000 milliLiter(s) IV Continuous <Continuous>  dextrose 50% Injectable 12.5 Gram(s) IV Push once  dextrose 50% Injectable 25 Gram(s) IV Push once  dextrose 50% Injectable 25 Gram(s) IV Push once  donepezil 10 milliGRAM(s) Oral at bedtime  ferrous    sulfate 325 milliGRAM(s) Oral three times a day  glucagon  Injectable 1 milliGRAM(s) IntraMuscular once PRN  insulin lispro (HumaLOG) corrective regimen sliding scale   SubCutaneous three times a day before meals  insulin lispro Injectable (HumaLOG) 2 Unit(s) SubCutaneous three times a day before meals  memantine 10 milliGRAM(s) Oral two times a day  morphine  - Injectable 2 milliGRAM(s) IV Push every 15 minutes PRN  ondansetron Injectable 4 milliGRAM(s) IV Push once PRN  tamsulosin 0.4 milliGRAM(s) Oral at bedtime      HEALTH ISSUES - PROBLEM Dx:  Urinary tract infection with hematuria, site unspecified: Urinary tract infection with hematuria, site unspecified,continue cipro for 1 week  Dementia: Dementia  DM (diabetes mellitus): DM (diabetes mellitus)  HTN (hypertension): HTN (hypertension)  Anemia: Anemia,s/p prbc  Urinary retention: Urinary retention,resolved  Hematuria: Hematuria,resolved,d/c [facility] today

## 2018-10-24 NOTE — PROGRESS NOTE ADULT - REASON FOR ADMISSION
urinary retention
urinary retention & NISHANT
urinary retention

## 2019-03-08 NOTE — H&P ADULT - NSHPROSALLOTHERNEGRD_GEN_ALL_CORE
Geneva Lockhart, RN        10/25/18 10:31 AM   Note         MESSAGE:   Fwd message to ENDO NONCLINICAL pool:       Message to be postponed to 1 month prior to when pt's injection is due.     Patient is due for Prolia injection again after 4/25/18.     - Will Standing order still be valid at the expected appt date?  yes     - Order BMP random if patient has not had one within 6 weeks of the expected appt date.     -Order repeat calcium for 1 week after the expected appt date.       - Dx: M81.0     When is the next Dexa Bone scan due?  Spring 2019              All other review of systems negative, except as noted in HPI

## 2019-05-21 ENCOUNTER — OUTPATIENT (OUTPATIENT)
Dept: OUTPATIENT SERVICES | Facility: HOSPITAL | Age: 84
LOS: 1 days | Discharge: HOME | End: 2019-05-21

## 2019-05-21 DIAGNOSIS — Z98.890 OTHER SPECIFIED POSTPROCEDURAL STATES: Chronic | ICD-10-CM

## 2019-05-21 DIAGNOSIS — E11.22 TYPE 2 DIABETES MELLITUS WITH DIABETIC CHRONIC KIDNEY DISEASE: ICD-10-CM

## 2019-05-21 DIAGNOSIS — N17.9 ACUTE KIDNEY FAILURE, UNSPECIFIED: ICD-10-CM

## 2019-05-21 PROBLEM — D64.9 ANEMIA, UNSPECIFIED: Chronic | Status: ACTIVE | Noted: 2018-10-11

## 2019-06-15 ENCOUNTER — OUTPATIENT (OUTPATIENT)
Dept: OUTPATIENT SERVICES | Facility: HOSPITAL | Age: 84
LOS: 1 days | Discharge: HOME | End: 2019-06-15

## 2019-06-15 DIAGNOSIS — Z98.890 OTHER SPECIFIED POSTPROCEDURAL STATES: Chronic | ICD-10-CM

## 2019-06-17 DIAGNOSIS — R79.9 ABNORMAL FINDING OF BLOOD CHEMISTRY, UNSPECIFIED: ICD-10-CM

## 2021-05-04 NOTE — BRIEF OPERATIVE NOTE - ESTIMATED BLOOD LOSS
50 Tissue Cultured Epidermal Autograft Text: The defect edges were debeveled with a #15 scalpel blade.  Given the location of the defect, shape of the defect and the proximity to free margins a tissue cultured epidermal autograft was deemed most appropriate.  The graft was then trimmed to fit the size of the defect.  The graft was then placed in the primary defect and oriented appropriately.

## 2021-08-09 NOTE — ED ADULT TRIAGE NOTE - HEIGHT IN FEET
Reviewed the chart there is a referral for ENT to Ghada Garcia NP is the the only referral for the patient for this issue?  Please assist   5

## 2022-02-17 NOTE — PROGRESS NOTE ADULT - PROBLEM SELECTOR PLAN 4
How Severe Are Your Warts?: moderate
Is This A New Presentation, Or A Follow-Up?: Warts
continue Norvasc  BP stable
continue Norvasc  BP stable

## 2022-10-06 NOTE — PHYSICAL THERAPY INITIAL EVALUATION ADULT - FUNCTIONAL LIMITATIONS, PT EVAL
[de-identified] : 80 year old female presents with right hip pain. She has completed physical therapy, and overall feels much better. She is also working with 3 lb ankle weights every other day. She reports recently, while walking more on vacation, she did not feel the need to use a cane once. She is feeling more confident.\par Pmhx: Notes a Sulfa allergy. We prescribed Mobic but she decided not to take it.\par \par Radiology Results 04/25/2022:\par o Lumbosacral Spine : AP and lateral views were obtained and reveal diffuse diffuse degenerative disc disease worse at L2/L3 and L3/L4 with anterior osteophytes and foraminal stenosis worse at L2/L3 and L3/L4. Diffuse facet arthropathy. \par o Pelvis and Right Hip : AP pelvis and lateral RIGHT hip were obtained and reveal moderate arthritis of the right hip. Mild arthritis left hip. Right sacroiliac joint arthritis.
community/leisure/self-care/home management

## 2022-11-11 NOTE — PATIENT PROFILE ADULT. - NS PRO PT RIGHT SUPPORT PERSON
Anesthesia Evaluation     Patient summary reviewed and Nursing notes reviewed   no history of anesthetic complications:  NPO Solid Status: > 8 hours  NPO Liquid Status: > 2 hours           Airway   Mallampati: II  TM distance: >3 FB  Neck ROM: full  No difficulty expected  Dental      Pulmonary - normal exam    breath sounds clear to auscultation  (+) sleep apnea,   Cardiovascular - normal exam  Exercise tolerance: poor (<4 METS)    Rhythm: regular  Rate: normal    (+) hyperlipidemia,       Neuro/Psych  (+) seizures well controlled, neuromuscular disease (CP), psychiatric history Schizophrenia,    GI/Hepatic/Renal/Endo - negative ROS     Musculoskeletal     Abdominal    Substance History      OB/GYN          Other        ROS/Med Hx Other: PAT Nursing Notes unavailable.                   Anesthesia Plan    ASA 3     general and MAC     (Small mass Right near elbow)    Anesthetic plan, risks, benefits, and alternatives have been provided, discussed and informed consent has been obtained with: patient and mother.        CODE STATUS:        Declines

## 2023-02-11 NOTE — H&P ADULT - CRANIAL NERVE
Dr. Bucio notified pt's x-ray clear; UA showed 500 glucose & 40 ketones. Dr. Bucio states to watch surgical incision for s/s of infection.   not done

## 2023-04-29 NOTE — PROGRESS NOTE ADULT - ASSESSMENT
NISHANT /HAGMA/ hyperkalemia:  # multifactorial : obstruction with prerenal  # creatinine continues to improve    # non oliguric  # keep de leon in  # BP at goal   # start sodium bicarbonatye po q 8   # ct noted : please repeat MILES haro f/up   # check repeat K   #will follow Negative

## 2023-05-09 NOTE — ED PROVIDER NOTE - DATE/TIME 1
Post Biopsy Wound Care Instructions  After your biopsy, minimize activity of the affected area for the remainder of the day if possible.  Minimize straining, strenuous activity, bending, and lifting.  If you have discomfort, use Tylenol every 4-6 hours.  Do not take aspirin, ibuprofen, or ibuprofen-like products for pain relief, they may cause increased bleeding.  Minor swelling after a biopsy is expected.  Use ice-packs 2-3 times a day for 10-15 minutes if it occurs.   Keep the bandage applied in the Dermatology Clinic in place for 24 hours. The surgical site needs to be kept completely DRY during this time to optimize healing.  It is okay for water and/or soap and/or shampoo to run over the wound. Dry carefully and re-apply a bandage as below.  After cleaning wound with soap and water, re-apply a thin layer of petroleum jelly, Vaseline or Aquaphor to the biopsy site  If BLEEDING occurs, apply continuous gentle pressure with a clean, dry washcloth or gauze for 15 minutes. DO NOT check the site during these 15 minutes.  IF there is still bleeding after that time repeat this step.  If bleeding continues, call our office immediately.  Some redness and swelling is expected.  This does not require treatment and will resolve in 1-2 weeks.  If the area should become very SORE or RED, if there is DRAINAGE, or if you have a FEVER, call our office immediately.  If you have bleeding that will not stop, or are worried about infection, please call the office: [phone]        
11-Oct-2018 11:58

## 2023-08-14 NOTE — CONSULT NOTE ADULT - REASON FOR ADMISSION
2023       Charanjit Bernal MD  [address] [facility] [address]  [facility] [address]  Via In Basket      Patient: Nolan Young   YOB: 1951   Date of Visit: 2023       Dear Dr. Bernal:    Thank you for referring Nolan Young to me for evaluation. Below are my notes for this visit with him.    If you have questions, please do not hesitate to call me. I look forward to following your patient along with you.      Sincerely,        Lefty Shine MD        CC: No Recipients  Lefty Shine MD  2023  3:24 PM  Signed  [facility]  [address], [place] [address]  P [phone]  F [phone]    PATIENT:  Nolan Young   : 1951  Referring physician: Charanjit Bernal MD      CHIEF COMPLAINT:   Chief Complaint   Patient presents with   • Follow-up     3 month        HISTORY OF PRESENT ILLNESS:  Nolan is a 71 year old male patient with medical history of CAD s/p stent  (Dr. Munoz at [facility]), reported possible cardiac arrest, prior h/o provoked PE s/p thyroidectomy, HTN, HLD, class 3 severe obesity (BMI 42), TAIWO, and FVL on Xarelto who was sent from the cardiology clinic to ED on 23 due to increasing frequency of chest pain episodes.  Patient underwent angiogram 23 with PCI x2 stents to LAD.  He was discharged the next day on 5/10/23 on appropriate medications including Plavix and Xarelto.    Patient went back to [facility] ED on 5/15/23 with complaints of increasing frequency of chest pain episodes that was lasting about 1 to 2 minutes after getting out of bed.  Pain immediately subsided and was already at mild level when he presented to ED.  Patient also had mild left hand numbness at the time without any other associated symptoms which has also resolved.  Cardiac work-up in the ED shows EKG without ischemic changes with negative troponin level.  Limited echo was also done which was unremarkable.  His chest  pains resolved.  Patient was started on Imdur 30 mg p.o. daily and was discharged to home with instructions to keep appointment with cardiology service.    Last seen in our office on 5/17/23 by DUNIA Tony for a routine hospital follow-up visit.  Patient stated he has been doing well and denies any further chest pains chest pains or any other cardiovascular symptoms.  However he and his wife are concerned that he is feeling a little more depressed and somewhat irritable.  Otherwise he denies any other issues or complaints. However he has not started the new med, Imdur that was prescribed during ER visit as he just received the medication today from his pharmacy. Lipids ordered, completed. Started Repatha.    Pt is presenting for a cardiovascular follow-up.  His main complaint is tiredness.  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  Because of his work he did not go to cardiac rehab.  Ordered him after his cardiovascular event with stenting he has problems at work.  He has obstructive sleep apnea but he does not want to use CPAP.  He was prescribed PCSK9 inhibitor but he is not taking it.  Medications reconciled.    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 2 mL 3   • OMEGA-3 KRILL OIL PO Take 800 mg by mouth daily.     • cetirizine (ZyrTEC) 10 MG tablet Take 10 mg by mouth daily.     • isosorbide mononitrate (IMDUR) 30 MG 24 hr tablet Take 1 tablet by mouth daily. 90 tablet 3   • clopidogrel (PLAVIX) 75 MG tablet Take 1 tablet by mouth daily. Do not start before May 11, 2023. 30 tablet 3   • Pitavastatin Calcium (Livalo) 4 MG Tab Take 4 mg by mouth daily. 30 tablet 3   • nebivolol (BYSTOLIC) 2.5 MG tablet Take 2.5 mg by mouth daily.     • Xarelto 20 MG Tab Take 20 mg by mouth daily.     • buPROPion XL (WELLBUTRIN XL) 150 MG 24 hr tablet Take 150 mg by mouth daily.     • citalopram (CeleXA) 40 MG tablet Take 80 mg by mouth daily.     • folic acid (FOLATE) 1 MG tablet Take 1 mg by mouth daily.     • levothyroxine 137 MCG tablet Take 137 mcg by mouth daily.       No current facility-administered medications for this visit.       SOCIAL HISTORY:    Social History     Tobacco Use   • Smoking status: Former     Current packs/day: 0.00     Types: Cigarettes     Quit date: 10/10/2022     Years since quittin.8   • Smokeless tobacco: Never   Vaping Use   • Vaping Use: never used   Substance Use Topics   • Alcohol use: Yes     Alcohol/week: 0.0 standard drinks of alcohol     Comment: occ   • Drug use: Never       FAMILY HISTORY:    Family History   Problem Relation Age of Onset   • Hypertension Mother    • Patient is unaware of any medical problems Father        REVIEW OF SYSTEMS:    Respiratory: Cough: denies.   Constitutional: Fatigue: denies.   Dermatologic: Rash: denies.   Endocrine: Sleep disturbance: denies.   Gastrointestinal: Abdomina pain: denies.   Hematologic: Abnormal bleeding: denies.   Musculoskeletal: Muscle aches: denies.   Neurologic: Dizziness: denies.   Ophthalmologic: Loss of vision: denies.   Psychology: Depression: denies.    Urologic: Blood in urine: denies.   Cardiac: As per HPI    Remainder are reviewed and are negative.       PHYSICAL EXAMINATION:  /68 (BP Location: LUE - Left upper extremity)   Pulse (!) 54   Resp 18   Ht 5' 11\" (1.803 m)   Wt 135.2 kg (298 lb 2.8 oz)   BMI 41.59 kg/m²   BSA 2.5 m²     Constitutional: appears well-developed and well-nourished. Alert and oriented.   Head: Normocephalic and atraumatic.   Nose: Nose normal.   Mouth/Throat: Mucous membranes are moist.  Normal oropharynx  Eyes: Pupils are equal, round, and reactive to light.   Neck: Normal range of motion. Neck supple. No thyromegaly  Cardiovascular: Regular rate and rhythm, normal S1, S2 no S3 or S4. No murmur, rub or gallops  Pulmonary: Effort normal and breath sounds normal. No wheezes, rales or rhonchi  Abdominal: Soft. Bowel sounds are normal. No hepatosplenomegaly.  Musculoskeletal: Normal range of motion. No tenderness.   Neurological: grossly normal.   Skin: Skin is warm and dry.   Psych: normal mood and affect  Extremities: no significant edema, right radial access site looks good with no hematoma  Pulses: LE DP/PT palpable    I personally reviewed and interpreted recent laboratory values in the chart.   LABS    Sodium (mmol/L)   Date Value   05/15/2023 142     Potassium (mmol/L)   Date Value   05/15/2023 4.7     Chloride (mmol/L)   Date Value   05/15/2023 105     Glucose (mg/dL)   Date Value   05/15/2023 92     Calcium (mg/dL)   Date Value   05/15/2023 8.8     Carbon Dioxide (mmol/L)   Date Value   05/15/2023 25     BUN (mg/dL)   Date Value   05/15/2023 16     Creatinine (mg/dL)   Date Value   05/15/2023 1.32 (H)       GOT/AST (Units/L)   Date Value   05/15/2023 42 (H)     GPT/ALT (Units/L)   Date Value   05/15/2023 69 (H)     No results found for: \"GGTP\"  Alkaline Phosphatase (Units/L)   Date Value   05/15/2023 61     Bilirubin, Total (mg/dL)   Date Value   05/15/2023 0.5        WBC (K/mcL)   Date Value   05/15/2023 4.9     RBC  (mil/mcL)   Date Value   05/15/2023 4.59     HCT (%)   Date Value   05/15/2023 41.4     HGB (g/dL)   Date Value   05/15/2023 14.1     PLT (K/mcL)   Date Value   05/15/2023 154       Hemoglobin A1C (%)   Date Value   05/08/2023 5.1       Cholesterol (mg/dL)   Date Value   08/12/2023 179     HDL (mg/dL)   Date Value   08/12/2023 51     Triglycerides (mg/dL)   Date Value   08/12/2023 49     LDL (mg/dL)   Date Value   08/12/2023 118       IMAGING STUDIES/CARDIAC TESTING:   I have reviewed the pertinent imaging study reports. These are the pertinent findings:  EKG 5/8/23: I personally reviewed and interpreted the ECG and shows sinus bradycardia, rate 54, incomplete RBBB, 1st deg AVB  ms     Coronary angiography 5/9/23:  Key Findings/Plan   • Prox LAD to Mid LAD lesion with 75% stenosis.  • Mid LAD-1 lesion with 30% stenosis.  • Mid LAD-2 lesion with 95% stenosis.  • Dist LM lesion with 30% stenosis.  Findings:  1. 1-vessel severe coronary artery disease involving severe tandem lesions in the LAD.   2. Successful IVUS-guided PCI of LAD with overlapping 3.0 x 26 mm and 2.75 x 38 mm Resolute [place] GIOVANA post-dilated proximally to 4.0, mid to 3.25, and distally to 3.0 mm.   3. Elevated left-heart filling pressure (LVEDP 20 mm Hg). Given 20 mg of IV lasix x 1.   4. No significant gradient across aortic valve by pullback technique.       Limited TTE 5/15/23:  SUMMARY:     Left ventricle: Systolic function is normal. The ejection fraction  was measured by biplane method of disks. No regional wall motion  abnormalities. The ejection fraction is 55%.  IMPRESSIONS:  LV systolic function is stable from prior assessment.    TTE 5/9/23:    1. Left ventricle: The cavity size is normal. Wall thickness is mildly     increased. There is concentric hypertrophy. Systolic function is normal.     The ejection fraction was measured by biplane method of disks. The ejection     fraction is 64%.  2. Right ventricle: The cavity size  is normal. Wall thickness is normal.     Systolic function is normal.  3. No significant valvular abnormalities.    TTE 5/25/21:   1. Left ventricle: The cavity size is normal. Wall thickness is normal.     The ejection fraction is 65%.  2. No significant valvular disease.       ASSESSMENT AND PLAN:    CAD PCI 2010/ 5/9/23 with PCI x2 stents to LAD.    General doing well.  He blames tiredness on clopidogrel but I explained to him this is very unlikely needed we can always switch him to ticagrelor but I do not think there are indications so in the end we stayed with clopidogrel.  Unfortunately he did not go to cardiac rehab because of their work schedule.     Dyspnea on exertion  Tiredness  I think exertional shortness of breath related to obesity and tiredness considering his work schedule obviously is well explained.  At this point no further work-up.    FVL on Xarelto  H/o provoked PE  Continue for anticoagulation with Xarelto being managed by his PCP.     Essential hypertension  Well contrlled     Mixed hyperlipidemia  Statin intolerance   from recent hospitalization, 5/10/23    Goal should be < 70  Tried Crestor/Lipitor in the past which he did not tolerate due to myalgias.     started on Livalo 4 mg daily.   8/23 so there is no significant change.  Now continue statin for pleiotropic effect.  He was offered PCSK9 inhibitor but he refused taking injectable.  On follow-up with us to discuss.    Class 3 severe obesity  I gave him referral to bariatrics  DR TRISH PASTOR    [facility]  [phone] (bariatric coordinator: Galilea Pruitt)  [email or web address]  [address] [facility], [place]: [place] [address]  [address] [place] [address]: [place] [address]     TAIWO on CPAP  Not compliant CPAP therapy.     Hypothyroidism  S/p thyroidectomy.  On levothyroxine.  Continue management per PCP.     Anxiety/depression  Patient currently on antidepressant medications.  Recommended discuss further management of  un responsiveness,unable to ambulate   since past 24 hrs depression/anxiety/irritability with his PCP.    No orders of the defined types were placed in this encounter.    There are no discontinued medications.  Return in about 6 months (around 2/14/2024).    Lefty Shine MD  Cardiology & Interventional Cardiology   Peripheral Arterial & Venous Disease   Director, [facility]iac [facility]    [facility]  [facility]  Plan of care discussed with the patient.  All questions were answered.  Patient verbalized understanding and agrees with the plan.    A letter has been sent to referring physician

## 2024-07-09 NOTE — ED ADULT NURSE NOTE - CCCP TRG CHIEF CMPLNT
Patient presents with blurry vision since 10:00 today. Neurologically intact. However, will check CT head, labs and reassess. OT evaluate and treat hematuria